# Patient Record
Sex: FEMALE | Race: WHITE | NOT HISPANIC OR LATINO | Employment: OTHER | URBAN - METROPOLITAN AREA
[De-identification: names, ages, dates, MRNs, and addresses within clinical notes are randomized per-mention and may not be internally consistent; named-entity substitution may affect disease eponyms.]

---

## 2017-02-15 ENCOUNTER — TELEPHONE (OUTPATIENT)
Dept: ENDOCRINOLOGY | Facility: CLINIC | Age: 52
End: 2017-02-15

## 2017-02-15 LAB
25(OH)D3 SERPL-MCNC: 80 NG/ML (ref 30–100)
TSH SERPL-ACNC: 1.13 MIU/L

## 2017-10-03 ENCOUNTER — TELEPHONE (OUTPATIENT)
Dept: ENDOCRINOLOGY | Facility: CLINIC | Age: 52
End: 2017-10-03

## 2017-10-03 DIAGNOSIS — E03.9 HYPOTHYROIDISM, UNSPECIFIED TYPE: Primary | ICD-10-CM

## 2017-10-03 DIAGNOSIS — E89.0 POSTABLATIVE HYPOTHYROIDISM: ICD-10-CM

## 2017-10-03 DIAGNOSIS — E55.9 VITAMIN D DEFICIENCY: ICD-10-CM

## 2017-10-03 RX ORDER — LEVOTHYROXINE SODIUM 100 UG/1
100 TABLET ORAL DAILY
Qty: 90 TABLET | Refills: 1 | Status: SHIPPED | OUTPATIENT
Start: 2017-10-03 | End: 2018-02-09 | Stop reason: SDUPTHER

## 2017-10-04 ENCOUNTER — TELEPHONE (OUTPATIENT)
Dept: ENDOCRINOLOGY | Facility: CLINIC | Age: 52
End: 2017-10-04

## 2017-10-04 DIAGNOSIS — E89.0 POSTABLATIVE HYPOTHYROIDISM: ICD-10-CM

## 2017-10-04 DIAGNOSIS — E55.9 VITAMIN D DEFICIENCY DISEASE: Primary | ICD-10-CM

## 2017-10-04 NOTE — TELEPHONE ENCOUNTER
Patient notified lab orders are placed for [facility] and to have drawn a week or two before her follow up visit

## 2017-10-04 NOTE — TELEPHONE ENCOUNTER
Patient has a follow up visit with you in February.  Needs tsh and vitamin d orders placed to be done at [facility].

## 2017-10-04 NOTE — TELEPHONE ENCOUNTER
----- Message from Carla Mcdonough sent at 10/4/2017 10:33 AM CDT -----  Contact: Self    [phone]  Shahrzad   -  Patient  Is returning the Dr phone call , please call the pt back. No addidional information was provided  thanks

## 2018-01-31 LAB
ALBUMIN SERPL-MCNC: 4.1 G/DL (ref 3.6–5.1)
ALBUMIN/GLOB SERPL: 1 (CALC) (ref 1–2.5)
ALP SERPL-CCNC: 50 U/L (ref 33–130)
ALT SERPL-CCNC: 12 U/L (ref 6–29)
AST SERPL-CCNC: 22 U/L (ref 10–35)
BILIRUB SERPL-MCNC: 0.4 MG/DL (ref 0.2–1.2)
BUN SERPL-MCNC: 13 MG/DL (ref 7–25)
BUN/CREAT SERPL: ABNORMAL (CALC) (ref 6–22)
CALCIUM SERPL-MCNC: 9.2 MG/DL (ref 8.6–10.4)
CHLORIDE SERPL-SCNC: 103 MMOL/L (ref 98–110)
CO2 SERPL-SCNC: 30 MMOL/L (ref 20–31)
CREAT SERPL-MCNC: 0.71 MG/DL (ref 0.5–1.05)
GFR SERPL CREATININE-BSD FRML MDRD: 97 ML/MIN/1.73M2
GLOBULIN SER CALC-MCNC: 4 G/DL (CALC) (ref 1.9–3.7)
GLUCOSE SERPL-MCNC: 92 MG/DL (ref 65–99)
POTASSIUM SERPL-SCNC: 3.7 MMOL/L (ref 3.5–5.3)
PROT SERPL-MCNC: 8.1 G/DL (ref 6.1–8.1)
SODIUM SERPL-SCNC: 137 MMOL/L (ref 135–146)
TSH SERPL-ACNC: 0.86 MIU/L

## 2018-02-09 ENCOUNTER — OFFICE VISIT (OUTPATIENT)
Dept: ENDOCRINOLOGY | Facility: CLINIC | Age: 53
End: 2018-02-09
Payer: MEDICARE

## 2018-02-09 VITALS
DIASTOLIC BLOOD PRESSURE: 74 MMHG | BODY MASS INDEX: 22.57 KG/M2 | HEIGHT: 66 IN | WEIGHT: 140.44 LBS | SYSTOLIC BLOOD PRESSURE: 116 MMHG

## 2018-02-09 DIAGNOSIS — E05.00 GRAVES' ORBITOPATHY: ICD-10-CM

## 2018-02-09 DIAGNOSIS — E89.0 POSTABLATIVE HYPOTHYROIDISM: Primary | ICD-10-CM

## 2018-02-09 DIAGNOSIS — Z86.39 HX OF GRAVES' DISEASE: ICD-10-CM

## 2018-02-09 DIAGNOSIS — E55.9 VITAMIN D DEFICIENCY DISEASE: ICD-10-CM

## 2018-02-09 PROCEDURE — 99213 OFFICE O/P EST LOW 20 MIN: CPT | Mod: PBBFAC | Performed by: INTERNAL MEDICINE

## 2018-02-09 PROCEDURE — 99213 OFFICE O/P EST LOW 20 MIN: CPT | Mod: S$GLB,,, | Performed by: INTERNAL MEDICINE

## 2018-02-09 PROCEDURE — 99999 PR PBB SHADOW E&M-EST. PATIENT-LVL III: CPT | Mod: PBBFAC,,, | Performed by: INTERNAL MEDICINE

## 2018-02-09 RX ORDER — LEVOTHYROXINE SODIUM 100 UG/1
100 TABLET ORAL DAILY
Qty: 30 TABLET | Refills: 11 | Status: SHIPPED | OUTPATIENT
Start: 2018-02-09 | End: 2018-03-26

## 2018-02-09 RX ORDER — DULOXETIN HYDROCHLORIDE 20 MG/1
20 CAPSULE, DELAYED RELEASE ORAL 2 TIMES DAILY
COMMUNITY
End: 2024-02-15

## 2018-02-09 NOTE — PROGRESS NOTES
Subjective:      Patient ID: Alina Rendon is a 52 y.o. female.    Chief Complaint:  Postablative hypothyroidism      History of Present Illness  Ms. TAMMY Rendon presents for follow up of hypothyroidism. Last seen 10/2016.    With hypothyroidism s/p I-131 tx on 1999 for Graves' disease and is taking Synthroid 93 mcg daily and she does this by taking 100 mcg daily except for Sunday that she takes 1/2 tab.  Takes thyroid hormone on empty stomach and separate from other meds. No missed doses.     Has had more fatigue recently. No hot flashes. Has cold intolerance.      Has Graves' orbitopathy but denies blurry vision, double vision, eye pain, or worsening proptosis.     10 lb wt gain since last visit. No diarrhea or constipation.     No excess hair loss. No dry skin.      For vitamin D deficiency she is taking vitamin D 1000 daily. No hx of fractures.    Has CIDP/ autoimmune neuropathy that is being treated with IV Ig infusion every month and other meds.       Has neuropathy and Cymbalta recently added.      Review of Systems   Constitutional: Negative for chills and fever.   Gastrointestinal: Negative for nausea.   No CP  No SOB    Objective:   Physical Exam   Nursing note and vitals reviewed.    Lab Review:   Results for ALINA WHITFIELD (MRN [id number]) as of 2/9/2018 08:08   Ref. Range 1/17/2013 10:45 2/14/2017 12:33 1/30/2018 13:15   TSH Latest Units: mIU/L 1.454 1.13 0.86   Free T4 Latest Ref Range: 0.71 - 1.51 ng/dl 1.27         Assessment:     1. Postablative hypothyroidism    2. Hx of Graves' disease    3. Graves' orbitopathy    4. Vitamin D deficiency disease      Plan:     --Patient with history of Graves disease now s/p ROJAS ablation with hypothyroidism  --Clinically and biochemically euthyroid at this time  --Will continue current dose of Synthroid 100mcg M-Sat with 50 mcg on Sun (refill sent to pharmacy  --Will repeat TSH in 6 months  --Has Graves orbitopathy that is not clinically active at this  time  --Continue Vit D 1000 units daily     RTC in 1 year, TSH in 6 months     Marty Markham M.D. Staff Endocrinology

## 2018-03-26 DIAGNOSIS — E89.0 POSTABLATIVE HYPOTHYROIDISM: ICD-10-CM

## 2018-03-26 RX ORDER — LEVOTHYROXINE SODIUM 100 UG/1
100 TABLET ORAL DAILY
Qty: 90 TABLET | Refills: 1 | Status: SHIPPED | OUTPATIENT
Start: 2018-03-26 | End: 2018-10-12 | Stop reason: SDUPTHER

## 2018-05-05 ENCOUNTER — OFFICE VISIT (OUTPATIENT)
Dept: URGENT CARE | Facility: CLINIC | Age: 53
End: 2018-05-05
Payer: COMMERCIAL

## 2018-05-05 VITALS
HEIGHT: 66 IN | WEIGHT: 143 LBS | BODY MASS INDEX: 22.98 KG/M2 | HEART RATE: 91 BPM | RESPIRATION RATE: 12 BRPM | DIASTOLIC BLOOD PRESSURE: 64 MMHG | SYSTOLIC BLOOD PRESSURE: 112 MMHG | OXYGEN SATURATION: 98 %

## 2018-05-05 DIAGNOSIS — K29.00 ACUTE GASTRITIS WITHOUT HEMORRHAGE, UNSPECIFIED GASTRITIS TYPE: Primary | ICD-10-CM

## 2018-05-05 DIAGNOSIS — R10.9 ABDOMINAL PAIN, UNSPECIFIED ABDOMINAL LOCATION: ICD-10-CM

## 2018-05-05 PROCEDURE — 99203 OFFICE O/P NEW LOW 30 MIN: CPT | Mod: S$GLB,,, | Performed by: FAMILY MEDICINE

## 2018-05-05 RX ORDER — POTASSIUM CHLORIDE 20 MEQ/1
TABLET, EXTENDED RELEASE ORAL
COMMUNITY
Start: 2018-01-29 | End: 2018-05-05

## 2018-05-05 RX ORDER — SIMETHICONE 125 MG
125 TABLET,CHEWABLE ORAL EVERY 6 HOURS PRN
Qty: 20 TABLET | Refills: 0 | Status: SHIPPED | OUTPATIENT
Start: 2018-05-05 | End: 2018-05-15

## 2018-05-05 RX ORDER — ONDANSETRON 4 MG/1
4 TABLET, ORALLY DISINTEGRATING ORAL EVERY 8 HOURS PRN
Qty: 9 TABLET | Refills: 0 | Status: SHIPPED | OUTPATIENT
Start: 2018-05-05 | End: 2018-05-08

## 2018-05-05 RX ORDER — DICYCLOMINE HYDROCHLORIDE 20 MG/1
20 TABLET ORAL
Qty: 20 TABLET | Refills: 0 | Status: SHIPPED | OUTPATIENT
Start: 2018-05-05 | End: 2018-05-10

## 2018-05-05 NOTE — PROGRESS NOTES
"Subjective:       Patient ID: Alina Rendon is a 52 y.o. female.    Vitals:  height is 5' 6" (1.676 m) and weight is 64.9 kg (143 lb). Her blood pressure is 112/64 and her pulse is 91. Her respiration is 12 and oxygen saturation is 98%.     Chief Complaint: Abdominal Pain    Abdominal Pain   This is a new problem. Episode onset: 1 1/2 week. The onset quality is sudden. The problem occurs intermittently. The problem has been gradually worsening. The pain is located in the generalized abdominal region. The pain is at a severity of 10/10. The pain is severe. The abdominal pain does not radiate. Associated symptoms include constipation, diarrhea and frequency. Pertinent negatives include no anorexia, arthralgias, belching, dysuria, fever, flatus, headaches, hematochezia, hematuria, melena, myalgias, nausea, vomiting or weight loss. Nothing aggravates the pain. The pain is relieved by nothing. She has tried nothing for the symptoms.     Review of Systems   Constitution: Negative for chills, fever and weight loss.   Cardiovascular: Negative for chest pain.   Respiratory: Negative for shortness of breath.    Musculoskeletal: Negative for arthralgias, back pain and myalgias.   Gastrointestinal: Positive for abdominal pain, constipation and diarrhea. Negative for anorexia, flatus, hematochezia, melena, nausea and vomiting.   Genitourinary: Positive for frequency. Negative for dysuria and hematuria.   Neurological: Negative for headaches.   All other systems reviewed and are negative.      Objective:      Physical Exam   Constitutional: She is oriented to person, place, and time. She appears well-developed and well-nourished.   HENT:   Head: Normocephalic and atraumatic.   Right Ear: External ear normal.   Left Ear: External ear normal.   Nose: Nose normal.   Mouth/Throat: Mucous membranes are normal.   Eyes: Conjunctivae and lids are normal.   Neck: Trachea normal and full passive range of motion without pain. Neck " supple.   Cardiovascular: Normal rate, regular rhythm and normal heart sounds.    Pulmonary/Chest: Effort normal and breath sounds normal. No respiratory distress.   Abdominal: Soft. Normal appearance. She exhibits no distension, no abdominal bruit, no pulsatile midline mass and no mass. Bowel sounds are increased. There is no hepatosplenomegaly. There is generalized tenderness. There is no rigidity, no rebound, no guarding, no CVA tenderness, no tenderness at McBurney's point and negative Hansen's sign.   Neurological: She is alert and oriented to person, place, and time. She has normal strength.   Skin: Skin is warm, dry and intact. She is not diaphoretic. No pallor.   Psychiatric: She has a normal mood and affect. Her speech is normal and behavior is normal. Judgment and thought content normal. Cognition and memory are normal.   Nursing note and vitals reviewed.      Assessment:       1. Acute gastritis without hemorrhage, unspecified gastritis type    2. Abdominal pain, unspecified abdominal location        Plan:         Acute gastritis without hemorrhage, unspecified gastritis type  -     dicyclomine (BENTYL) 20 mg tablet; Take 1 tablet (20 mg total) by mouth 4 (four) times daily before meals and nightly.  Dispense: 20 tablet; Refill: 0  -     ondansetron (ZOFRAN-ODT) 4 MG TbDL; Take 1 tablet (4 mg total) by mouth every 8 (eight) hours as needed.  Dispense: 9 tablet; Refill: 0  -     ranitidine (ZANTAC) 150 MG tablet; Take 1 tablet (150 mg total) by mouth nightly.  Dispense: 30 tablet; Refill: 0    Abdominal pain, unspecified abdominal location  -     X-Ray Abdomen AP 1 View; Future; Expected date: 05/05/2018    Other orders  -     simethicone (MYLICON) 125 MG chewable tablet; Take 1 tablet (125 mg total) by mouth every 6 (six) hours as needed for Flatulence.  Dispense: 20 tablet; Refill: 0      Patient Instructions     Gastritis (Adult)    Gastritis is inflammation and irritation of the stomach lining. It can  be present for a short time (acute) or be long lasting (chronic). Gastritis is often caused by infection with bacteria called H pylori. More than a third of people in the [place] have this bacteria in their bodies. In many cases, H pylori causes no problems or symptoms. In some people, though, the infection irritates the stomach lining and causes gastritis. Other causes of stomach irritation include drinking alcohol or taking pain-relieving medicines called NSAIDs (such as aspirin or ibuprofen).   Symptoms of gastritis can include:  · Abdominal pain or bloating  · Loss of appetite  · Nausea or vomiting  · Vomiting blood or having black stools  · Feeling more tired than usual  An inflamed and irritated stomach lining is more likely to develop a sore called an ulcer. To help prevent this, gastritis should be treated.  Home care  If needed, medicines may be prescribed. If you have H pylori infection, treating it will likely relieve your symptoms. Other changes can help reduce stomach irritation and help it heal.  · If you have been prescribed medicines for H pylori infection, take them as directed. Take all of the medicine until it is finished or your healthcare provider tells you to stop, even if you feel better.  · Your healthcare provider may recommend avoiding NSAIDs. If you take daily aspirin for your heart or other medical reasons, do not stop without talking to your healthcare provider first.  · Avoid drinking alcohol.  · Stop smoking. Smoking can irritate the stomach and delay healing. As much as possible, stay away from second hand smoke.  Follow-up care  Follow up with your healthcare provider, or as advised by our staff. Testing may be needed to check for inflammation or an ulcer.  When to seek medical advice  Call your healthcare provider for any of the following:  · Stomach pain that gets worse or moves to the lower right abdomen (appendix area)  · Chest pain that appears or gets worse, or spreads to the back,  neck, shoulder, or arm  · Frequent vomiting (cant keep down liquids)  · Blood in the stool or vomit (red or black in color)  · Feeling weak or dizzy  · Fever of 100.4ºF (38ºC) or higher, or as directed by your healthcare provider  Date Last Reviewed: 6/22/2015  © 1397-2299 [employer]. [address] [place] [address]. All rights reserved. This information is not intended as a substitute for professional medical care. Always follow your healthcare professional's instructions.

## 2018-05-05 NOTE — PATIENT INSTRUCTIONS
Gastritis (Adult)    Gastritis is inflammation and irritation of the stomach lining. It can be present for a short time (acute) or be long lasting (chronic). Gastritis is often caused by infection with bacteria called H pylori. More than a third of people in the [place] have this bacteria in their bodies. In many cases, H pylori causes no problems or symptoms. In some people, though, the infection irritates the stomach lining and causes gastritis. Other causes of stomach irritation include drinking alcohol or taking pain-relieving medicines called NSAIDs (such as aspirin or ibuprofen).   Symptoms of gastritis can include:  · Abdominal pain or bloating  · Loss of appetite  · Nausea or vomiting  · Vomiting blood or having black stools  · Feeling more tired than usual  An inflamed and irritated stomach lining is more likely to develop a sore called an ulcer. To help prevent this, gastritis should be treated.  Home care  If needed, medicines may be prescribed. If you have H pylori infection, treating it will likely relieve your symptoms. Other changes can help reduce stomach irritation and help it heal.  · If you have been prescribed medicines for H pylori infection, take them as directed. Take all of the medicine until it is finished or your healthcare provider tells you to stop, even if you feel better.  · Your healthcare provider may recommend avoiding NSAIDs. If you take daily aspirin for your heart or other medical reasons, do not stop without talking to your healthcare provider first.  · Avoid drinking alcohol.  · Stop smoking. Smoking can irritate the stomach and delay healing. As much as possible, stay away from second hand smoke.  Follow-up care  Follow up with your healthcare provider, or as advised by our staff. Testing may be needed to check for inflammation or an ulcer.  When to seek medical advice  Call your healthcare provider for any of the following:  · Stomach pain that gets worse or moves to the lower  right abdomen (appendix area)  · Chest pain that appears or gets worse, or spreads to the back, neck, shoulder, or arm  · Frequent vomiting (cant keep down liquids)  · Blood in the stool or vomit (red or black in color)  · Feeling weak or dizzy  · Fever of 100.4ºF (38ºC) or higher, or as directed by your healthcare provider  Date Last Reviewed: 6/22/2015  © 3416-7183 [employer]. [address] [place] [address]. All rights reserved. This information is not intended as a substitute for professional medical care. Always follow your healthcare professional's instructions.

## 2018-10-12 DIAGNOSIS — E89.0 POSTABLATIVE HYPOTHYROIDISM: ICD-10-CM

## 2018-10-12 RX ORDER — LEVOTHYROXINE SODIUM 100 UG/1
100 TABLET ORAL DAILY
Qty: 90 TABLET | Refills: 1 | Status: SHIPPED | OUTPATIENT
Start: 2018-10-12 | End: 2019-01-21 | Stop reason: SDUPTHER

## 2019-01-03 ENCOUNTER — OFFICE VISIT (OUTPATIENT)
Dept: URGENT CARE | Facility: CLINIC | Age: 54
End: 2019-01-03
Payer: COMMERCIAL

## 2019-01-03 VITALS
OXYGEN SATURATION: 97 % | HEART RATE: 88 BPM | RESPIRATION RATE: 20 BRPM | WEIGHT: 143 LBS | BODY MASS INDEX: 22.98 KG/M2 | SYSTOLIC BLOOD PRESSURE: 103 MMHG | HEIGHT: 66 IN | TEMPERATURE: 98 F | DIASTOLIC BLOOD PRESSURE: 61 MMHG

## 2019-01-03 DIAGNOSIS — R30.0 DYSURIA: ICD-10-CM

## 2019-01-03 DIAGNOSIS — R31.9 URINARY TRACT INFECTION WITH HEMATURIA, SITE UNSPECIFIED: Primary | ICD-10-CM

## 2019-01-03 DIAGNOSIS — N39.0 URINARY TRACT INFECTION WITH HEMATURIA, SITE UNSPECIFIED: Primary | ICD-10-CM

## 2019-01-03 LAB
BILIRUB UR QL STRIP: NEGATIVE
GLUCOSE UR QL STRIP: NEGATIVE
KETONES UR QL STRIP: NEGATIVE
LEUKOCYTE ESTERASE UR QL STRIP: POSITIVE
PH, POC UA: 5.5 (ref 5–8)
POC BLOOD, URINE: POSITIVE
POC NITRATES, URINE: NEGATIVE
PROT UR QL STRIP: NEGATIVE
SP GR UR STRIP: 1.01 (ref 1–1.03)
UROBILINOGEN UR STRIP-ACNC: NORMAL (ref 0.1–1.1)

## 2019-01-03 PROCEDURE — 81003 URINALYSIS AUTO W/O SCOPE: CPT | Mod: QW,S$GLB,, | Performed by: NURSE PRACTITIONER

## 2019-01-03 PROCEDURE — 99214 OFFICE O/P EST MOD 30 MIN: CPT | Mod: 25,S$GLB,, | Performed by: NURSE PRACTITIONER

## 2019-01-03 PROCEDURE — 99214 PR OFFICE/OUTPT VISIT, EST, LEVL IV, 30-39 MIN: ICD-10-PCS | Mod: 25,S$GLB,, | Performed by: NURSE PRACTITIONER

## 2019-01-03 PROCEDURE — 81003 POCT URINALYSIS, DIPSTICK, AUTOMATED, W/O SCOPE: ICD-10-PCS | Mod: QW,S$GLB,, | Performed by: NURSE PRACTITIONER

## 2019-01-03 RX ORDER — PHENAZOPYRIDINE HYDROCHLORIDE 200 MG/1
200 TABLET, FILM COATED ORAL 3 TIMES DAILY PRN
Qty: 20 TABLET | Refills: 0 | Status: SHIPPED | OUTPATIENT
Start: 2019-01-03 | End: 2019-07-10 | Stop reason: CLARIF

## 2019-01-03 RX ORDER — NITROFURANTOIN 25; 75 MG/1; MG/1
100 CAPSULE ORAL 2 TIMES DAILY
Qty: 14 CAPSULE | Refills: 0 | Status: SHIPPED | OUTPATIENT
Start: 2019-01-03 | End: 2019-01-10

## 2019-01-03 NOTE — PATIENT INSTRUCTIONS

## 2019-01-03 NOTE — PROGRESS NOTES
"Subjective:       Patient ID: Alina Rendon is a 53 y.o. female.    Vitals:  height is 5' 6" (1.676 m) and weight is 64.9 kg (143 lb). Her temperature is 98 °F (36.7 °C). Her blood pressure is 103/61 and her pulse is 88. Her respiration is 20 and oxygen saturation is 97%.     Chief Complaint: Urinary Tract Infection    Pt has a sensation to urinate but doesn't emptie as much. She also has some burning.      Urinary Tract Infection    This is a new problem. The current episode started yesterday. The problem occurs every urination. The problem has been unchanged. The quality of the pain is described as burning. The pain is mild. Pertinent negatives include no chills, frequency, hematuria, nausea, urgency, vomiting or rash. She has tried nothing for the symptoms.       Constitution: Negative for chills and fever.   Neck: Negative for painful lymph nodes.   Gastrointestinal: Negative for abdominal pain, nausea and vomiting.   Genitourinary: Positive for dysuria. Negative for frequency, urgency, urine decreased, hematuria, history of kidney stones, painful menstruation, irregular menstruation, missed menses, heavy menstrual bleeding, ovarian cysts, genital trauma, vaginal pain, vaginal discharge, vaginal bleeding, vaginal odor, painful intercourse, genital sore, painful ejaculation and pelvic pain.   Musculoskeletal: Negative for back pain.   Skin: Negative for rash and lesion.   Hematologic/Lymphatic: Negative for swollen lymph nodes.       Objective:      Physical Exam   Constitutional: She is oriented to person, place, and time. She appears well-developed and well-nourished.   HENT:   Head: Normocephalic and atraumatic.   Nose: Nose normal. No nasal deformity. No epistaxis.   Mouth/Throat: Mucous membranes are normal.   Eyes: Conjunctivae and lids are normal.   Neck: Trachea normal, normal range of motion and phonation normal. Neck supple.   Cardiovascular: Normal rate, regular rhythm, normal heart sounds and " "normal pulses.   Pulmonary/Chest: Effort normal and breath sounds normal.   Abdominal: Soft. Normal appearance and bowel sounds are normal. She exhibits no distension and no mass. There is no tenderness. There is no CVA tenderness.   Genitourinary: No vaginal discharge found.   Neurological: She is alert and oriented to person, place, and time.   Skin: Skin is warm, dry and intact.   Psychiatric: She has a normal mood and affect. Her speech is normal and behavior is normal. Cognition and memory are normal.   Nursing note and vitals reviewed.      Results for orders placed or performed in visit on 01/03/19   POCT Urinalysis, Dipstick, Automated, W/O Scope   Result Value Ref Range    POC Blood, Urine Positive (A) Negative    POC Bilirubin, Urine Negative Negative    POC Urobilinogen, Urine normal 0.1 - 1.1    POC Ketones, Urine Negative Negative    POC Protein, Urine Negative Negative    POC Nitrates, Urine Negative Negative    POC Glucose, Urine Negative Negative    pH, UA 5.5 5 - 8    POC Specific Gravity, Urine 1.015 1.003 - 1.029    POC Leukocytes, Urine Positive (A) Negative       Assessment:       1. Urinary tract infection with hematuria, site unspecified    2. Dysuria        Plan:       Patient Instructions     Bladder Infection, Female (Adult)    Urine is normally doesn't have any bacteria in it. But bacteria can get into the urinary tract from the skin around the rectum. Or they can travel in the blood from elsewhere in the body. Once they are in your urinary tract, they can cause infection in the urethra (urethritis), the bladder (cystitis), or the kidneys (pyelonephritis).  The most common place for an infection is in the bladder. This is called a bladder infection. This is one of the most common infections in women. Most bladder infections are easily treated. They are not serious unless the infection spreads to the kidney.  The phrases "bladder infection," "UTI," and "cystitis" are often used to describe " the same thing. But they are not always the same. Cystitis is an inflammation of the bladder. The most common cause of cystitis is an infection.  Symptoms  The infection causes inflammation in the urethra and bladder. This causes many of the symptoms. The most common symptoms of a bladder infection are:  · Pain or burning when urinating  · Having to urinate more often than usual  · Urgent need to urinate  · Only a small amount of urine comes out  · Blood in urine  · Abdominal discomfort. This is usually in the lower abdomen above the pubic bone.  · Cloudy urine  · Strong- or bad-smelling urine  · Unable to urinate (urinary retention)  · Unable to hold urine in (urinary incontinence)  · Fever  · Loss of appetite  · Confusion (in older adults)  Causes  Bladder infections are not contagious. You can't get one from someone else, from a toilet seat, or from sharing a bath.  The most common cause of bladder infections is bacteria from the bowels. The bacteria get onto the skin around the opening of the urethra. From there, they can get into the urine and travel up to the bladder, causing inflammation and infection. This usually happens because of:  · Wiping improperly after urinating. Always wipe from front to back.  · Bowel incontinence  · Pregnancy  · Procedures such as having a catheter inserted  · Older age  · Not emptying your bladder. This can allow bacteria a chance to grow in your urine.  · Dehydration  · Constipation  · Sex  · Use of a diaphragm for birth control   Treatment  Bladder infections are diagnosed by a urine test. They are treated with antibiotics and usually clear up quickly without complications. Treatment helps prevent a more serious kidney infection.  Medicines  Medicines can help in the treatment of a bladder infection:  · Take antibiotics until they are used up, even if you feel better. It is important to finish them to make sure the infection has cleared.  · You can use acetaminophen or  ibuprofen for pain, fever, or discomfort, unless another medicine was prescribed. If you have chronic liver or kidney disease, talk with your healthcare provider before using these medicines. Also talk with your provider if you've ever had a stomach ulcer or gastrointestinal bleeding, or are taking blood-thinner medicines.  · If you are given phenazopydridine to reduce burning with urination, it will cause your urine to become a bright orange color. This can stain clothing.  Care and prevention  These self-care steps can help prevent future infections:  · Drink plenty of fluids to prevent dehydration and flush out your bladder. Do this unless you must restrict fluids for other health reasons, or your doctor told you not to.  · Proper cleaning after going to the bathroom is important. Wipe from front to back after using the toilet to prevent the spread of bacteria.  · Urinate more often. Don't try to hold urine in for a long time.  · Wear loose-fitting clothes and cotton underwear. Avoid tight-fitting pants.  · Improve your diet and prevent constipation. Eat more fresh fruit and vegetables, and fiber, and less junk and fatty foods.  · Avoid sex until your symptoms are gone.  · Avoid caffeine, alcohol, and spicy foods. These can irritate your bladder.  · Urinate right after intercourse to flush out your bladder.  · If you use birth control pills and have frequent bladder infections, discuss it with your doctor.  Follow-up care  Call your healthcare provider if all symptoms are not gone after 3 days of treatment. This is especially important if you have repeat infections.  If a culture was done, you will be told if your treatment needs to be changed. If directed, you can call to find out the results.  If X-rays were done, you will be told if the results will affect your treatment.  Call 911  Call 911 if any of the following occur:  · Trouble breathing  · Hard to wake up or confusion  · Fainting or loss of  consciousness  · Rapid heart rate  When to seek medical advice  Call your healthcare provider right away if any of these occur:  · Fever of 100.4ºF (38.0ºC) or higher, or as directed by your healthcare provider  · Symptoms are not better by the third day of treatment  · Back or belly (abdominal) pain that gets worse  · Repeated vomiting, or unable to keep medicine down  · Weakness or dizziness  · Vaginal discharge  · Pain, redness, or swelling in the outer vaginal area (labia)  Date Last Reviewed: 10/1/2016  © 0348-4304 [employer]. [address] [place] [address]. All rights reserved. This information is not intended as a substitute for professional medical care. Always follow your healthcare professional's instructions.              Urinary tract infection with hematuria, site unspecified    Dysuria  -     POCT Urinalysis, Dipstick, Automated, W/O Scope    Other orders  -     nitrofurantoin, macrocrystal-monohydrate, (MACROBID) 100 MG capsule; Take 1 capsule (100 mg total) by mouth 2 (two) times daily. for 7 days  Dispense: 14 capsule; Refill: 0  -     phenazopyridine (PYRIDIUM) 200 MG tablet; Take 1 tablet (200 mg total) by mouth 3 (three) times daily as needed for Pain.  Dispense: 20 tablet; Refill: 0

## 2019-01-15 ENCOUNTER — TELEPHONE (OUTPATIENT)
Dept: ENDOCRINOLOGY | Facility: CLINIC | Age: 54
End: 2019-01-15

## 2019-01-15 NOTE — TELEPHONE ENCOUNTER
----- Message from Merle Jaimes sent at 1/15/2019  2:01 PM CST -----  Contact: Self [phone]  PT is requesting a call at [phone]. No other information provided.

## 2019-01-15 NOTE — TELEPHONE ENCOUNTER
Did a PA over the phone to [facility] and they should send in something with in 72 hours.

## 2019-01-15 NOTE — TELEPHONE ENCOUNTER
Patient said they her insurance company will no longer cover her Synthroid and she will no longer go on generic. She now is under [facility]. I told her I would give them a call now an start a PA for Brand name only.

## 2019-01-21 DIAGNOSIS — E89.0 POSTABLATIVE HYPOTHYROIDISM: ICD-10-CM

## 2019-01-21 RX ORDER — LEVOTHYROXINE SODIUM 100 UG/1
100 TABLET ORAL DAILY
Qty: 90 TABLET | Refills: 1 | Status: SHIPPED | OUTPATIENT
Start: 2019-01-21 | End: 2019-05-17 | Stop reason: SDUPTHER

## 2019-01-21 NOTE — TELEPHONE ENCOUNTER
----- Message from Rosa Maria Ruiz sent at 1/21/2019  1:22 PM CST -----  Contact: Alina    [phone]   Needs Advice    Reason for call:   Pt.says she wants to speak to  You about the Synthroid and how it needs to be sent to the Pharmacy, since it was recently denied.         Communication Preference:  Phone     Additional Information:  Pls call to discuss this problem, as per pt.

## 2019-01-22 ENCOUNTER — TELEPHONE (OUTPATIENT)
Dept: ENDOCRINOLOGY | Facility: CLINIC | Age: 54
End: 2019-01-22

## 2019-01-22 NOTE — TELEPHONE ENCOUNTER
Returned pt's call.   After speaking with pt and pharmacy, figured out that pt needs a PA for her Synthroid because she can not take the generic brand.

## 2019-01-22 NOTE — TELEPHONE ENCOUNTER
----- Message from Radha Hall sent at 1/22/2019 12:34 PM CST -----  Contact: Pt   Pt is requesting a call back in regards to changing her  SYNTHROID 100 mcg tablet to the generic brand.       Pt can be contacted at [phone]

## 2019-01-24 ENCOUNTER — TELEPHONE (OUTPATIENT)
Dept: ENDOCRINOLOGY | Facility: CLINIC | Age: 54
End: 2019-01-24

## 2019-01-24 NOTE — TELEPHONE ENCOUNTER
Received approval back for Synthroid for patient.     PA Case ID: [id number]   Approval date: 01/24/2019-01/24/2020     Patient notified.

## 2019-01-24 NOTE — TELEPHONE ENCOUNTER
Submitted PA Request for Synthroid (name brand only) to patient's insurance via CoverMyMeds.     Key: [id number]

## 2019-05-17 DIAGNOSIS — E89.0 POSTABLATIVE HYPOTHYROIDISM: ICD-10-CM

## 2019-05-17 RX ORDER — LEVOTHYROXINE SODIUM 100 UG/1
100 TABLET ORAL DAILY
Qty: 90 TABLET | Refills: 1 | Status: SHIPPED | OUTPATIENT
Start: 2019-05-17 | End: 2020-01-21

## 2019-05-17 NOTE — TELEPHONE ENCOUNTER
----- Message from Ayleen Hall sent at 5/17/2019  1:42 PM CDT -----  Contact: self  Pt needs a refill on her medication.  She uses [facility].    Pt can be reached at [phone]    eÃ‡ift    Pt would also like to schedule an appt.

## 2019-07-10 ENCOUNTER — OFFICE VISIT (OUTPATIENT)
Dept: URGENT CARE | Facility: CLINIC | Age: 54
End: 2019-07-10
Payer: COMMERCIAL

## 2019-07-10 VITALS
BODY MASS INDEX: 23.08 KG/M2 | DIASTOLIC BLOOD PRESSURE: 69 MMHG | TEMPERATURE: 98 F | SYSTOLIC BLOOD PRESSURE: 119 MMHG | WEIGHT: 143 LBS | HEART RATE: 77 BPM | OXYGEN SATURATION: 98 %

## 2019-07-10 DIAGNOSIS — N30.01 ACUTE CYSTITIS WITH HEMATURIA: Primary | ICD-10-CM

## 2019-07-10 LAB
BILIRUB UR QL STRIP: NEGATIVE
GLUCOSE UR QL STRIP: NEGATIVE
KETONES UR QL STRIP: NEGATIVE
LEUKOCYTE ESTERASE UR QL STRIP: POSITIVE
PH, POC UA: 6 (ref 5–8)
POC BLOOD, URINE: POSITIVE
POC NITRATES, URINE: NEGATIVE
PROT UR QL STRIP: POSITIVE
SP GR UR STRIP: 1.01 (ref 1–1.03)
UROBILINOGEN UR STRIP-ACNC: ABNORMAL (ref 0.1–1.1)

## 2019-07-10 PROCEDURE — 99214 PR OFFICE/OUTPT VISIT, EST, LEVL IV, 30-39 MIN: ICD-10-PCS | Mod: S$GLB,,, | Performed by: PHYSICIAN ASSISTANT

## 2019-07-10 PROCEDURE — 81003 POCT URINALYSIS, DIPSTICK, AUTOMATED, W/O SCOPE: ICD-10-PCS | Mod: QW,S$GLB,, | Performed by: PHYSICIAN ASSISTANT

## 2019-07-10 PROCEDURE — 99214 OFFICE O/P EST MOD 30 MIN: CPT | Mod: S$GLB,,, | Performed by: PHYSICIAN ASSISTANT

## 2019-07-10 PROCEDURE — 81003 URINALYSIS AUTO W/O SCOPE: CPT | Mod: QW,S$GLB,, | Performed by: PHYSICIAN ASSISTANT

## 2019-07-10 RX ORDER — NITROFURANTOIN 25; 75 MG/1; MG/1
100 CAPSULE ORAL 2 TIMES DAILY
Qty: 10 CAPSULE | Refills: 0 | Status: SHIPPED | OUTPATIENT
Start: 2019-07-10 | End: 2019-07-15

## 2019-07-10 RX ORDER — PHENAZOPYRIDINE HYDROCHLORIDE 200 MG/1
200 TABLET, FILM COATED ORAL 2 TIMES DAILY PRN
Qty: 6 TABLET | Refills: 0 | Status: SHIPPED | OUTPATIENT
Start: 2019-07-10 | End: 2019-07-13

## 2019-07-10 NOTE — PATIENT INSTRUCTIONS
- Rest.    - Drink plenty of fluids.    - Tylenol or Ibuprofen as directed as needed for fever/pain. Avoid tylenol if you have a history of liver disease. Do not take ibuprofen if you have a history of GI bleeding, kidney disease, or if you take blood thinners.     - I prescribed phenazopyridine for symptom relief of burning with urination.  If this medication is not covered by insurance, You can take over the counter AZO as directed for discomfort with urination.       - You have been given an antibiotic to treat your condition today.    - Please complete the antibiotic as directed on the bottle.   - If you are female and on oral birth control pills, use additional methods to prevent pregnancy while on antibiotics and for one cycle after.   - you can take over-the-counter probiotics during and after antibiotic use to help preserve gut jose alejandro and reduce gastrointestinal symptoms    - Follow up with your PCP or specialty clinic as directed in the next 3-4 days if not improved or as needed.  You can call [phone] to schedule an appointment with the appropriate provider.    - Go to the ER or seek medical attention immediately if you develop new or worsening symptoms.    - You must understand that you have received an Urgent Care treatment only and that you may be released before all of your medical problems are known or treated.   - You, the patient, will arrange for follow up care as instructed.   - If your condition worsens or fails to improve we recommend that you receive another evaluation at the ER immediately or contact your PCP to discuss your concerns or return here.         Urinary Tract Infections in Women    Urinary tract infections (UTIs) are most often caused by bacteria (germs). These bacteria enter the urinary tract. The bacteria may come from outside the body. Or they may travel from the skin outside the rectum or vagina into the urethra. Female anatomy makes it easy for bacteria from the bowel to  enter a womans urinary tract, which is the most common source of UTI. This means women develop UTIs more often than men. Pain in or around the urinary tract is a common UTI symptom. But the only way to know for sure if you have a UTI for the healthcare provider to test your urine. The two tests that may be done are the urinalysis and urine culture.  Types of UTIs  · Cystitis: A bladder infection (cystitis) is the most common UTI in women. You may have urgent or frequent urination. You may also have pain, burning when you urinate, and bloody urine.  · Urethritis: This is an inflamed urethra, which is the tube that carries urine from the bladder to outside the body. You may have lower stomach or back pain. You may also have urgent or frequent urination.  · Pyelonephritis: This is a kidney infection. If not treated, it can be serious and damage your kidneys. In severe cases, you may be hospitalized. You may have a fever and lower back pain.  Medicines to treat a UTI  Most UTIs are treated with antibiotics. These kill the bacteria. The length of time you need to take them depends on the type of infection. It may be as short as 3 days. If you have repeated UTIs, a low-dose antibiotic may be needed for several months. Take antibiotics exactly as directed. Dont stop taking them until all of the medicine is gone. If you stop taking the antibiotic too soon, the infection may not go away, and you may develop a resistance to the antibiotic. This can make it much harder to treat.  Lifestyle changes to treat and prevent UTIs  The lifestyle changes below will help get rid of your UTI. They may also help prevent future UTIs.  · Drink plenty of fluids. This includes water, juice, or other caffeine-free drinks. Fluids help flush bacteria out of your body.  · Empty your bladder. Always empty your bladder when you feel the urge to urinate. And always urinate before going to sleep. Urine that stays in your bladder can lead to  infection. Try to urinate before and after sex as well.  · Practice good personal hygiene. Wipe yourself from front to back after using the toilet. This helps keep bacteria from getting into the urethra.  · Use condoms during sex. These help prevent UTIs caused by sexually transmitted bacteria. Also, avoid using spermicides during sex. These can increase the risk of UTIs. Choose other forms of birth control instead. For women who tend to get UTIs after sex, a low-dose of a preventive antibiotic may be used. Be sure to discuss this option with your healthcare provider.  · Follow up with your healthcare provider as directed. He or she may test to make sure the infection has cleared. If needed, more treatment may be started.  Date Last Reviewed: 1/1/2017  © 5323-7608 [employer]. [address] [place] [address], [place] [address]. All rights reserved. This information is not intended as a substitute for professional medical care. Always follow your healthcare professional's instructions.

## 2019-07-10 NOTE — PROGRESS NOTES
Subjective:       Patient ID: Alina Rendon is a 53 y.o. female.    Vitals:  weight is 64.9 kg (143 lb). Her temperature is 98 °F (36.7 °C). Her blood pressure is 119/69 and her pulse is 77. Her oxygen saturation is 98%.     Chief Complaint: Urinary Tract Infection    Pt presents with dysuria, urgenc, and hematuria for the past few days.  No flank pain, fever, nausea, vomiting.    Urinary Tract Infection    This is a new problem. The current episode started in the past 7 days. The problem has been gradually worsening. The quality of the pain is described as aching. The pain is at a severity of 2/10. The pain is mild. There has been no fever. Associated symptoms include urgency. Pertinent negatives include no chills, flank pain, frequency, hematuria, nausea, vomiting or rash.       Constitution: Negative for chills, sweating, fatigue and fever.   Neck: Negative for painful lymph nodes.   Gastrointestinal: Negative for abdominal pain, nausea and vomiting.   Genitourinary: Positive for dysuria and urgency. Negative for frequency, urine decreased, flank pain, bladder incontinence, bed wetting, hematuria, history of kidney stones, painful menstruation, irregular menstruation, missed menses, heavy menstrual bleeding, ovarian cysts, genital trauma, vaginal pain, vaginal discharge, vaginal bleeding, vaginal odor, painful intercourse, genital sore, painful ejaculation and pelvic pain.   Musculoskeletal: Negative for back pain.   Skin: Negative for rash and lesion.   Hematologic/Lymphatic: Negative for swollen lymph nodes.       Objective:      Physical Exam   Constitutional: She is oriented to person, place, and time. She appears well-developed and well-nourished. She is cooperative.  Non-toxic appearance. She does not have a sickly appearance. She does not appear ill. No distress.   HENT:   Head: Normocephalic and atraumatic.   Right Ear: Hearing, tympanic membrane, external ear and ear canal normal.   Left Ear: Hearing,  tympanic membrane, external ear and ear canal normal.   Nose: Nose normal. No mucosal edema, rhinorrhea or nasal deformity. No epistaxis. Right sinus exhibits no maxillary sinus tenderness and no frontal sinus tenderness. Left sinus exhibits no maxillary sinus tenderness and no frontal sinus tenderness.   Mouth/Throat: Uvula is midline, oropharynx is clear and moist and mucous membranes are normal. No trismus in the jaw. Normal dentition. No uvula swelling. No posterior oropharyngeal erythema.   Eyes: Conjunctivae and lids are normal. No scleral icterus.   Sclera clear bilat   Neck: Trachea normal, normal range of motion, full passive range of motion without pain and phonation normal. Neck supple.   Cardiovascular: Normal rate, regular rhythm, normal heart sounds, intact distal pulses and normal pulses.   Pulmonary/Chest: Effort normal and breath sounds normal. No respiratory distress.   Abdominal: Soft. Normal appearance and bowel sounds are normal. She exhibits no distension and no mass. There is no tenderness. There is no rigidity, no rebound, no guarding, no CVA tenderness, no tenderness at McBurney's point and negative Hansen's sign.   No CVA or suprapubic tenderness.   Musculoskeletal: Normal range of motion. She exhibits no edema or deformity.   Neurological: She is alert and oriented to person, place, and time. She exhibits normal muscle tone. Coordination normal.   Skin: Skin is warm, dry and intact. She is not diaphoretic. No pallor.   Psychiatric: She has a normal mood and affect. Her speech is normal and behavior is normal. Judgment and thought content normal. Cognition and memory are normal.   Nursing note and vitals reviewed.        Results for orders placed or performed in visit on 07/10/19   POCT Urinalysis, Dipstick, Automated, W/O Scope   Result Value Ref Range    POC Blood, Urine Positive (A) Negative    POC Bilirubin, Urine Negative Negative    POC Urobilinogen, Urine norm 0.1 - 1.1    POC  Ketones, Urine Negative Negative    POC Protein, Urine Positive (A) Negative    POC Nitrates, Urine Negative Negative    POC Glucose, Urine Negative Negative    pH, UA 6.0 5 - 8    POC Specific Gravity, Urine 1.015 1.003 - 1.029    POC Leukocytes, Urine Positive (A) Negative       Assessment:       1. Acute cystitis with hematuria        Plan:         Acute cystitis with hematuria  -     POCT Urinalysis, Dipstick, Automated, W/O Scope  -     nitrofurantoin, macrocrystal-monohydrate, (MACROBID) 100 MG capsule; Take 1 capsule (100 mg total) by mouth 2 (two) times daily. for 5 days  Dispense: 10 capsule; Refill: 0  -     phenazopyridine (PYRIDIUM) 200 MG tablet; Take 1 tablet (200 mg total) by mouth 2 (two) times daily as needed for Pain.  Dispense: 6 tablet; Refill: 0      Patient Instructions   - Rest.    - Drink plenty of fluids.    - Tylenol or Ibuprofen as directed as needed for fever/pain. Avoid tylenol if you have a history of liver disease. Do not take ibuprofen if you have a history of GI bleeding, kidney disease, or if you take blood thinners.     - I prescribed phenazopyridine for symptom relief of burning with urination.  If this medication is not covered by insurance, You can take over the counter AZO as directed for discomfort with urination.       - You have been given an antibiotic to treat your condition today.    - Please complete the antibiotic as directed on the bottle.   - If you are female and on oral birth control pills, use additional methods to prevent pregnancy while on antibiotics and for one cycle after.   - you can take over-the-counter probiotics during and after antibiotic use to help preserve gut jose alejandro and reduce gastrointestinal symptoms    - Follow up with your PCP or specialty clinic as directed in the next 3-4 days if not improved or as needed.  You can call [phone] to schedule an appointment with the appropriate provider.    - Go to the ER or seek medical attention  immediately if you develop new or worsening symptoms.    - You must understand that you have received an Urgent Care treatment only and that you may be released before all of your medical problems are known or treated.   - You, the patient, will arrange for follow up care as instructed.   - If your condition worsens or fails to improve we recommend that you receive another evaluation at the ER immediately or contact your PCP to discuss your concerns or return here.         Urinary Tract Infections in Women    Urinary tract infections (UTIs) are most often caused by bacteria (germs). These bacteria enter the urinary tract. The bacteria may come from outside the body. Or they may travel from the skin outside the rectum or vagina into the urethra. Female anatomy makes it easy for bacteria from the bowel to enter a womans urinary tract, which is the most common source of UTI. This means women develop UTIs more often than men. Pain in or around the urinary tract is a common UTI symptom. But the only way to know for sure if you have a UTI for the healthcare provider to test your urine. The two tests that may be done are the urinalysis and urine culture.  Types of UTIs  · Cystitis: A bladder infection (cystitis) is the most common UTI in women. You may have urgent or frequent urination. You may also have pain, burning when you urinate, and bloody urine.  · Urethritis: This is an inflamed urethra, which is the tube that carries urine from the bladder to outside the body. You may have lower stomach or back pain. You may also have urgent or frequent urination.  · Pyelonephritis: This is a kidney infection. If not treated, it can be serious and damage your kidneys. In severe cases, you may be hospitalized. You may have a fever and lower back pain.  Medicines to treat a UTI  Most UTIs are treated with antibiotics. These kill the bacteria. The length of time you need to take them depends on the type of infection. It may be as  short as 3 days. If you have repeated UTIs, a low-dose antibiotic may be needed for several months. Take antibiotics exactly as directed. Dont stop taking them until all of the medicine is gone. If you stop taking the antibiotic too soon, the infection may not go away, and you may develop a resistance to the antibiotic. This can make it much harder to treat.  Lifestyle changes to treat and prevent UTIs  The lifestyle changes below will help get rid of your UTI. They may also help prevent future UTIs.  · Drink plenty of fluids. This includes water, juice, or other caffeine-free drinks. Fluids help flush bacteria out of your body.  · Empty your bladder. Always empty your bladder when you feel the urge to urinate. And always urinate before going to sleep. Urine that stays in your bladder can lead to infection. Try to urinate before and after sex as well.  · Practice good personal hygiene. Wipe yourself from front to back after using the toilet. This helps keep bacteria from getting into the urethra.  · Use condoms during sex. These help prevent UTIs caused by sexually transmitted bacteria. Also, avoid using spermicides during sex. These can increase the risk of UTIs. Choose other forms of birth control instead. For women who tend to get UTIs after sex, a low-dose of a preventive antibiotic may be used. Be sure to discuss this option with your healthcare provider.  · Follow up with your healthcare provider as directed. He or she may test to make sure the infection has cleared. If needed, more treatment may be started.  Date Last Reviewed: 1/1/2017  © 0961-3595 [employer]. [address] [place] [address], [place] [address]. All rights reserved. This information is not intended as a substitute for professional medical care. Always follow your healthcare professional's instructions.

## 2019-08-19 ENCOUNTER — PATIENT OUTREACH (OUTPATIENT)
Dept: ADMINISTRATIVE | Facility: HOSPITAL | Age: 54
End: 2019-08-19

## 2019-08-19 DIAGNOSIS — Z11.59 ENCOUNTER FOR HEPATITIS C SCREENING TEST FOR LOW RISK PATIENT: Primary | ICD-10-CM

## 2019-08-21 ENCOUNTER — PATIENT OUTREACH (OUTPATIENT)
Dept: ADMINISTRATIVE | Facility: HOSPITAL | Age: 54
End: 2019-08-21

## 2019-08-21 ENCOUNTER — TELEPHONE (OUTPATIENT)
Dept: ADMINISTRATIVE | Facility: HOSPITAL | Age: 54
End: 2019-08-21

## 2019-08-23 DIAGNOSIS — Z12.39 BREAST CANCER SCREENING: ICD-10-CM

## 2019-09-03 ENCOUNTER — OFFICE VISIT (OUTPATIENT)
Dept: FAMILY MEDICINE | Facility: CLINIC | Age: 54
End: 2019-09-03
Payer: MEDICARE

## 2019-09-03 VITALS
DIASTOLIC BLOOD PRESSURE: 72 MMHG | HEART RATE: 86 BPM | OXYGEN SATURATION: 97 % | BODY MASS INDEX: 24.1 KG/M2 | HEIGHT: 66 IN | SYSTOLIC BLOOD PRESSURE: 110 MMHG | TEMPERATURE: 99 F | WEIGHT: 149.94 LBS

## 2019-09-03 DIAGNOSIS — G61.81 CIDP (CHRONIC INFLAMMATORY DEMYELINATING POLYNEUROPATHY): Primary | ICD-10-CM

## 2019-09-03 DIAGNOSIS — G60.9 NEUROPATHY, IDIOPATHIC: ICD-10-CM

## 2019-09-03 DIAGNOSIS — E55.9 VITAMIN D DEFICIENCY DISEASE: ICD-10-CM

## 2019-09-03 DIAGNOSIS — Z86.39 HX OF GRAVES' DISEASE: ICD-10-CM

## 2019-09-03 DIAGNOSIS — E89.0 POSTABLATIVE HYPOTHYROIDISM: ICD-10-CM

## 2019-09-03 DIAGNOSIS — E05.00 GRAVES' ORBITOPATHY: ICD-10-CM

## 2019-09-03 PROBLEM — Z12.11 SCREENING FOR COLORECTAL CANCER: Status: ACTIVE | Noted: 2019-09-03

## 2019-09-03 PROBLEM — Z12.12 SCREENING FOR COLORECTAL CANCER: Status: ACTIVE | Noted: 2019-09-03

## 2019-09-03 PROCEDURE — 99213 OFFICE O/P EST LOW 20 MIN: CPT | Mod: PBBFAC,PO | Performed by: INTERNAL MEDICINE

## 2019-09-03 PROCEDURE — 99205 OFFICE O/P NEW HI 60 MIN: CPT | Mod: S$GLB,,, | Performed by: INTERNAL MEDICINE

## 2019-09-03 PROCEDURE — 99999 PR PBB SHADOW E&M-EST. PATIENT-LVL III: ICD-10-PCS | Mod: PBBFAC,,, | Performed by: INTERNAL MEDICINE

## 2019-09-03 PROCEDURE — 99205 PR OFFICE/OUTPT VISIT, NEW, LEVL V, 60-74 MIN: ICD-10-PCS | Mod: S$GLB,,, | Performed by: INTERNAL MEDICINE

## 2019-09-03 PROCEDURE — 99999 PR PBB SHADOW E&M-EST. PATIENT-LVL III: CPT | Mod: PBBFAC,,, | Performed by: INTERNAL MEDICINE

## 2019-09-03 RX ORDER — GABAPENTIN 600 MG/1
1200 TABLET ORAL 3 TIMES DAILY
Refills: 2 | COMMUNITY
Start: 2019-08-08 | End: 2024-02-15

## 2019-09-03 RX ORDER — NAPROXEN SODIUM 500 MG/1
250 TABLET, FILM COATED, EXTENDED RELEASE ORAL
COMMUNITY
End: 2024-02-15

## 2019-09-03 NOTE — PROGRESS NOTES
Chief complaint:  Establish care, physical    53-year-old white female here with her sister-in-law.  She is establishing care.  She is followed by neurologist and has difficulty seeing endocrinology for her hypothyroidism.  She is followed by Neurology for plasmapheresis for her neuropathy.  She now has a port in the right chest.  Her last mammogram was likely 2016 and she will discuss with the port nurses issues related to getting a mammogram and so forth.  She is up-to-date on her colon cancer screening.  I reviewed all available records.  She does have footdrop and is fearful of falls but not wearing her braces and encouraged her to do so as she has had some falls just with minimal transfers due to the ankles twisting and so forth.  She is on brand name Synthroid having had difficulties with regulation in the past.  She has difficulty seeing Endocrine and asks if we can assume management which should be no problem.  She has some remote vitamin-D deficiency.  She does need labs in her insurance mandates that she gets them at [facility].  He is also planning to have cataract surgery in the next 2 weeks some both eyes.  She has a form to complete which was done.  She has had no prior surgical history although did not remember the apparently she did have some surgery as below.    Regarding her neuropathy, she is currently getting plasmapheresis having completed and failed to respond IVIG.  She has generalized weakness and footdrop.  No trouble with breathing or swallowing.      ROS:   CONST: weight stable. EYES: no vision change. ENT: no sore throat. CV: no chest pain w/ exertion. RESP: no shortness of breath. GI: no nausea, vomiting, diarrhea. No dysphagia. : no urinary issues. MUSCULOSKELETAL: no new myalgias or arthralgias. SKIN: no new changes. NEURO: no focal deficits. PSYCH: no new issues. ENDOCRINE: no polyuria. HEME: no lymph nodes. ALLERGY: no general pruritis.    Past Medical History:   Diagnosis Date    CIDP  (chronic inflammatory demyelinating polyneuropathy) 2012    Grave's disease     s/p I-131 tx and Orbitopathy    Hypothyroidism     s/p I-1 31 tx    Neuropathy     Autoimmune etiology ( CIDP)    Screening for colorectal cancer 9/3/2019     normal 6/18, 10 years    Spondylolisthesis     Vitamin D deficiency      .  Past Surgical History:   Procedure Laterality Date    DILATION AND CURETTAGE OF UTERUS      ENDOMETRIAL ABLATION  10/27/2006    Thermachoice ablation    fractional D&C  10/2006    with ThermaChoice ablation     TEMPOROMANDIBULAR JOINT SURGERY      with skin grafts from Suprapubic area     Social History     Socioeconomic History    Marital status:      Spouse name: Not on file    Number of children: None    Years of education: Not on file    Highest education level: Not on file   Occupational History    Disabled   Social Needs    Financial resource strain: Not on file    Food insecurity:     Worry: Not on file     Inability: Not on file    Transportation needs:     Medical: Not on file     Non-medical: Not on file   Tobacco Use    Smoking status: Former Smoker, quit 10 years ago     Packs/day: 1.00     Years: 30.00     Pack years: 30.00     Types: Cigarettes     Last attempt to quit: 2010     Years since quittin.7    Smokeless tobacco: Never Used   Substance and Sexual Activity    Alcohol use: Yes     Comment: rarely    Drug use: No    Sexual activity: Yes     Partners: Male     Birth control/protection: None     Comment:  since : spouse: Gustavo   Nya    Physical activity:     Days per week: Not on file     Minutes per session: Not on file    Stress: Not on file   Relationships    Social connections:     Talks on phone: Not on file     Gets together: Not on file     Attends Hindu service: Not on file     Active member of club or organization: Not on file     Attends meetings of clubs or organizations: Not on file     Relationship status: Not on  file   Other Topics Concern    Not on file   Social History Narrative    Not on file     family history includes Arthritis in her mother; Diabetes in her father; Hyperlipidemia in her father and mother; Thyroid disease in her mother.    Gen: no distress  EYES: conjunctiva clear, non-icteric, PERRL  ENT: nose clear, nasal mucosa normal, oropharynx clear and moist, teeth good  NECK:supple, thyroid non-palpable  RESP: effort is good, lungs clear  CV: heart RRR w/o murmur, gallops or rubs; no carotid bruits, no edema  GI: abdomen soft, non-distended, non-tender, no hepatosplenomegaly  MS: gait - she is in a wheelchair, no clubbing or cyanosis of the digits  Neurologic:  Some general tremor, flaccid feet  SKIN: no rashes, warm to touch    Alina was seen today for annual exam.    Diagnoses and all orders for this visit:    CIDP (chronic inflammatory demyelinating polyneuropathy) she now has a port in the right chest but still gets blood work done peripherally.  I will send to [facility].  She will inquire about whether she can get a mammogram or if any changes need to be made to get a mammogram given the port.  She will continue follow-up with Neurology.  She is up-to-date on colon cancer screening.  Essentially her physical was done today although her Medicare product currently does not cover a formal physical it was done either way.  She was counseled and we reviewed all of her interval records.  I am happy to assume management of her hypothyroidism and vitamin-D deficiency.Total time over 60 minutes with over 50% counseling.  -     Vitamin D; Future  -     Comprehensive metabolic panel; Future  -     CBC auto differential; Future  -     T4, free; Future  -     TSH; Future  -     Lipid panel; Future  -     Vitamin D  -     Comprehensive metabolic panel  -     CBC auto differential  -     T4, free  -     TSH  -     Lipid panel    Hx of Graves' disease, reassess, appears TSH has been stable the last couple of years on the  brand name Synthroid    Vitamin D deficiency disease, reassess  -     Vitamin D; Future  -     Vitamin D    Graves' orbitopathy, keep follow-up with Ophthalmology    Postablative hypothyroidism  -     Vitamin D; Future  -     Comprehensive metabolic panel; Future  -     CBC auto differential; Future  -     T4, free; Future  -     TSH; Future  -     Lipid panel; Future  -     Vitamin D  -     Comprehensive metabolic panel  -     CBC auto differential  -     T4, free  -     TSH  -     Lipid panel    Neuropathy, idiopathic, keep follow-up with Neurology.

## 2019-10-02 ENCOUNTER — TELEPHONE (OUTPATIENT)
Dept: FAMILY MEDICINE | Facility: CLINIC | Age: 54
End: 2019-10-02

## 2019-10-02 NOTE — TELEPHONE ENCOUNTER
----- Message from Tracy Hall sent at 10/2/2019  9:23 AM CDT -----  Contact: Self   Type: Patient Call Back    Who called: Send     What is the request in detail:patient states Lisa never received her lab orders. Please resend Lisa Cristina in [place]     Can the clinic reply by [facility]? No     Would the patient rather a call back or a response via [facility]?  Call     Best call back number:[phone]

## 2019-11-08 DIAGNOSIS — E89.0 POSTABLATIVE HYPOTHYROIDISM: ICD-10-CM

## 2019-11-08 RX ORDER — LEVOTHYROXINE SODIUM 100 UG/1
TABLET ORAL
Qty: 90 TABLET | Refills: 1 | OUTPATIENT
Start: 2019-11-08

## 2020-01-15 ENCOUNTER — OFFICE VISIT (OUTPATIENT)
Dept: OPHTHALMOLOGY | Facility: CLINIC | Age: 55
End: 2020-01-15
Payer: COMMERCIAL

## 2020-01-15 ENCOUNTER — TELEPHONE (OUTPATIENT)
Dept: OTOLARYNGOLOGY | Facility: CLINIC | Age: 55
End: 2020-01-15

## 2020-01-15 DIAGNOSIS — H53.413 CENTRAL SCOTOMA, BOTH EYES: ICD-10-CM

## 2020-01-15 DIAGNOSIS — E05.00 GRAVES' ORBITOPATHY: Primary | ICD-10-CM

## 2020-01-15 DIAGNOSIS — H47.20 OPTIC ATROPHY, BOTH EYES: ICD-10-CM

## 2020-01-15 PROCEDURE — 99999 PR PBB SHADOW E&M-EST. PATIENT-LVL III: ICD-10-PCS | Mod: PBBFAC,,, | Performed by: OPHTHALMOLOGY

## 2020-01-15 PROCEDURE — 92133 POSTERIOR SEGMENT OCT OPTIC NERVE(OCULAR COHERENCE TOMOGRAPHY) - OU - BOTH EYES: ICD-10-PCS | Mod: S$GLB,,, | Performed by: OPHTHALMOLOGY

## 2020-01-15 PROCEDURE — 92004 PR EYE EXAM, NEW PATIENT,COMPREHESV: ICD-10-PCS | Mod: S$GLB,,, | Performed by: OPHTHALMOLOGY

## 2020-01-15 PROCEDURE — 92004 COMPRE OPH EXAM NEW PT 1/>: CPT | Mod: S$GLB,,, | Performed by: OPHTHALMOLOGY

## 2020-01-15 PROCEDURE — 92133 CPTRZD OPH DX IMG PST SGM ON: CPT | Mod: S$GLB,,, | Performed by: OPHTHALMOLOGY

## 2020-01-15 PROCEDURE — 99999 PR PBB SHADOW E&M-EST. PATIENT-LVL III: CPT | Mod: PBBFAC,,, | Performed by: OPHTHALMOLOGY

## 2020-01-15 RX ORDER — GABAPENTIN 600 MG/1
3 TABLET, FILM COATED ORAL DAILY
COMMUNITY
Start: 2019-12-23

## 2020-01-15 NOTE — PROGRESS NOTES
HPI     Referred by Dr.Sharett Chang  Patient notice OU vision decreasing x year.  Hx of Graves. Hx of CIPD(autoimmune)  Occasional sharp eye pain.  Eye drops:AT prn OU  Cataract surg-07/2019  MRI done    I have personally interviewed the patient, reviewed the history and   examined the patient and agree with the technician's exam.    She has been noting a gradual decrease in color vision. Her vision is dim   and grainy.  History of Graves' disease treated 20 years ago with   radioactive iodine. MRI 1/2/2020 reviewed and shows enlargement of   extraocular muscles.     Last edited by Donte James MD on 1/15/2020 10:42 AM. (History)        ROS     Negative for: Endocrine    Last edited by Donte James MD on 1/15/2020 10:07 AM. (History)        Assessment /Plan     For exam results, see Encounter Report.    Graves' orbitopathy  -     Ambulatory Referral to ENT    Central scotoma, both eyes  -     Posterior Segment OCT Optic Nerve- Both eyes  -     Ambulatory Referral to ENT    Optic atrophy, both eyes  -     Ambulatory Referral to ENT      Ms. Sharma's MRI shows evidence of enlargement of extraocular muscles bilaterally. I am concerned that her Graves' disease is resulting in compression of the optic nerves with progressive optic nerve damage bilaterally. I have ordered a consult by Dr. Sykes in ENT at [facility] to see if he agrees and to see if he recommends orbital decompression. I will repeat her exam and visual fields in two months.

## 2020-01-15 NOTE — LETTER
[facility] - Ophthalmology  [address]  [facility] [address]  Phone: [phone]  Fax: [phone]   January 15, 2020    Charlene Dockery MD  [address] [place] [address] [address]  [place] [address]    Patient: Alina Rendon   MR Number: [id number]   YOB: 1965   Date of Visit: 1/15/2020       Dear Dr. Dockery:    Thank you for referring Alina Rendon to me for evaluation. Here is my assessment and plan of care:    Assessment:  /Plan     For exam results, see Encounter Report.    Graves' orbitopathy  -     Ambulatory Referral to ENT    Central scotoma, both eyes  -     Posterior Segment OCT Optic Nerve- Both eyes  -     Ambulatory Referral to ENT    Optic atrophy, both eyes  -     Ambulatory Referral to ENT      Ms. Sharma's MRI shows evidence of enlargement of extraocular muscles bilaterally. I am concerned that her Graves' disease is resulting in compression of the optic nerves with progressive optic nerve damage bilaterally. I have ordered a consult by Dr. Sykes in ENT at [facility] to see if he agrees and to see if he recommends orbital decompression. I will repeat her exam and visual fields in two months.          Plan:  For exam results, see Encounter Report.    Graves' orbitopathy  -     Ambulatory Referral to ENT    Central scotoma, both eyes  -     Posterior Segment OCT Optic Nerve- Both eyes  -     Ambulatory Referral to ENT    Optic atrophy, both eyes  -     Ambulatory Referral to ENT      Ms. Sharma's MRI shows evidence of enlargement of extraocular muscles bilaterally. I am concerned that her Graves' disease is resulting in compression of the optic nerves with progressive optic nerve damage bilaterally. I have ordered a consult by Dr. Sykes in ENT at [facility] to see if he agrees and to see if he recommends orbital decompression. I will repeat her exam and visual fields in two months.            Below you will find my full exam findings. If you have questions, please do  not hesitate to call me. I look forward to following Ms. Alina Rendon along with you.    Sincerely,          Donte James MD       CC  Lucho Sykes MD             Base Eye Exam     Visual Acuity (Snellen - Linear)       Right Left    Dist cc 20/60 -2 CF at 6'    Dist ph cc NI NI    Correction:  Glasses          Pupils       Dark Light Shape React APD    Right 4 4 Round Minimal +3    Left 4 4 Round Minimal +4          Visual Fields    Diffuse depression with greater deficit superior temporally in both eyes on confrontation.           Extraocular Movement       Right Left     Full, Ortho Full, Ortho          Neuro/Psych     Oriented x3:  Yes    Mood/Affect:  Normal          Dilation     Both eyes:  1% Mydriacyl, 2.5% Phenylephrine @ 10:46 AM            Additional Tests     Color       Right Left    Ishihara No control No control            Slit Lamp and Fundus Exam     External Exam       Right Left    External 2+ Proptosis, Prolapsed fat pads: Upper, Lower Normal    Palpebral fissure 10 mm 8 mm    MRD1 4.0 mm 3.5 mm    Superior scleral show 0.0 mm 0.0 mm    Inferior scleral show 0.0 mm 0.0 mm    Levator 18 mm 17 mm    Lagophthalmos 0 mm 0 mm          Exophthalmometry (Base: 106)       Right Left     18 15          Slit Lamp Exam       Right Left    Lids/Lashes Normal Normal    Conjunctiva/Sclera 2+ Injection over muscles 2+ Injection over muscles    Cornea Clear Clear    Anterior Chamber Deep and quiet Deep and quiet    Iris Round and reactive Round and reactive    Lens 1+ Nuclear sclerosis Posterior chamber intraocular lens    Vitreous Normal Normal          Fundus Exam       Right Left    Disc 2+ Pallor  temporally 4+ Pallor diffusely    C/D Ratio 0.4 0.4    Macula Normal Normal    Vessels Normal Normal    Periphery Normal Normal

## 2020-01-17 ENCOUNTER — TELEPHONE (OUTPATIENT)
Dept: OTOLARYNGOLOGY | Facility: CLINIC | Age: 55
End: 2020-01-17

## 2020-01-17 NOTE — TELEPHONE ENCOUNTER
Placed call into patient regarding the referral we received for to be seen in our clinic. Will try calling back on Monday once we have spoken with Dr Mcdonough

## 2020-01-20 DIAGNOSIS — Z86.39 HX OF GRAVES' DISEASE: ICD-10-CM

## 2020-01-20 DIAGNOSIS — H47.20 OPTIC ATROPHY, BOTH EYES: ICD-10-CM

## 2020-01-20 DIAGNOSIS — E05.00 GRAVES' ORBITOPATHY: Primary | ICD-10-CM

## 2020-01-20 DIAGNOSIS — J34.89 NASAL OBSTRUCTION: ICD-10-CM

## 2020-01-20 RX ORDER — LIDOCAINE HYDROCHLORIDE 10 MG/ML
1 INJECTION, SOLUTION EPIDURAL; INFILTRATION; INTRACAUDAL; PERINEURAL ONCE
Status: CANCELLED | OUTPATIENT
Start: 2020-01-20 | End: 2020-01-20

## 2020-01-20 RX ORDER — DEXAMETHASONE SODIUM PHOSPHATE 4 MG/ML
8 INJECTION, SOLUTION INTRA-ARTICULAR; INTRALESIONAL; INTRAMUSCULAR; INTRAVENOUS; SOFT TISSUE
Status: CANCELLED | OUTPATIENT
Start: 2020-01-20

## 2020-01-20 RX ORDER — OXYMETAZOLINE HCL 0.05 %
2 SPRAY, NON-AEROSOL (ML) NASAL
Status: CANCELLED | OUTPATIENT
Start: 2020-01-20

## 2020-01-21 ENCOUNTER — TELEPHONE (OUTPATIENT)
Dept: OTOLARYNGOLOGY | Facility: CLINIC | Age: 55
End: 2020-01-21

## 2020-01-21 DIAGNOSIS — E89.0 POSTABLATIVE HYPOTHYROIDISM: ICD-10-CM

## 2020-01-21 RX ORDER — LEVOTHYROXINE SODIUM 100 UG/1
TABLET ORAL
Qty: 90 TABLET | Refills: 1 | Status: SHIPPED | OUTPATIENT
Start: 2020-01-21 | End: 2020-07-31

## 2020-01-21 NOTE — TELEPHONE ENCOUNTER
----- Message from Donte Mcdonough MD sent at 1/20/2020  8:00 PM CST -----  Regarding: Scheduling for orbit decompression  In order to facilitate this referral who was sent for orbital decompression, and appears to need surgery sooner than later to avoid ophthalmologic consequences such as loss of vision, I have placed orders prior to her consult being scheduled with me:  1. CT medtronic sinuses ordered which should preferably be obtained prior to clinic visit with me, and must at least be done prior to planned surgery.  2. OR case request - I submitted a request for next available date of 2/18 as only one case is currently scheduled on that date.     Ideally we should get her in to see me for consult within the next 2 weeks.    Thank you!  Donte

## 2020-01-21 NOTE — TELEPHONE ENCOUNTER
Called Patient and scheduled her Consult appointment with Dr. Mcdonough to discuss Surgery.  I also scheduled her CT Medtronic Scan appointment and also her Post-op appts since Dr. Mcdonough scheduled her Surgery for Feb 18, 2020.  Patient has full understanding of all appointments and the importance of keeping these appts.

## 2020-01-24 ENCOUNTER — TELEPHONE (OUTPATIENT)
Dept: OTOLARYNGOLOGY | Facility: CLINIC | Age: 55
End: 2020-01-24

## 2020-01-24 NOTE — TELEPHONE ENCOUNTER
Spoke with  MsTrae Tootie and explained to her that I changed her CT appt due to Dr. Mcdonough needing to do a peer to peer.  I also explained to Dr. Mcdonough that on Tuesday he needs to do a peer to peer with Patients Insurance Company to get her CT approved. Patient had full understanding.        Spoke with Patient and explained to her that her Insurance Company denied her CT Scan.  I placed a message in to Dr. Mcdonough to do a peer to peer with the Insurance Company to see if we can get this approved.  I told the Patient that I will call her to let her know the outcome, but keep her appt for the CT Scan on Tuesday unless she hears back from me otherwise.

## 2020-01-27 ENCOUNTER — TELEPHONE (OUTPATIENT)
Dept: OTOLARYNGOLOGY | Facility: CLINIC | Age: 55
End: 2020-01-27

## 2020-01-27 NOTE — TELEPHONE ENCOUNTER
Spoke with  Ms. ReganSommers on Friday and explained to her that I changed her CT appt due to Dr. Mcdonough needing to do a peer to peer.  I also explained to Dr. Mcdonough that on Tuesday he needs to do a peer to peer with Patients Insurance Company to get her CT approved.

## 2020-01-27 NOTE — TELEPHONE ENCOUNTER
Patient was calling to verify appt dates and times for test, clinic, preop, surgery. Patient moved the time of her CT Scan Friday 1/31/20 to 230pm. Also provided all dates and times of upcoming appts. Provided patient with the phone number to schedule a preop appt with the hospital for the same day of her appt with Dr Mcdonough.

## 2020-01-27 NOTE — TELEPHONE ENCOUNTER
----- Message from Carlene Ortiz sent at 1/27/2020  8:16 AM CST -----  Contact: Self/  [phone]  Type: Patient Call Back    Who called:  Patient    What is the request in detail:  Patient would like staff (Thomas)  to give her a call regarding CT Scan.  Thank you    Would the patient rather a call back or a response via [facility]?   Call back    Best call back number:  [phone]

## 2020-01-30 ENCOUNTER — TELEPHONE (OUTPATIENT)
Dept: OTOLARYNGOLOGY | Facility: CLINIC | Age: 55
End: 2020-01-30

## 2020-01-30 NOTE — TELEPHONE ENCOUNTER
Called Patient to let her know that Dr. Mcdonough did a peer to peer with her Insurance Company and got the CT Sinus Approved for tomorrow's CT Scan.  Called Erica in Admit and she will run Insurance through again with the approval for the CT Scan.

## 2020-01-31 ENCOUNTER — HOSPITAL ENCOUNTER (OUTPATIENT)
Dept: RADIOLOGY | Facility: HOSPITAL | Age: 55
Discharge: HOME | End: 2020-01-31
Attending: OTOLARYNGOLOGY
Payer: COMMERCIAL

## 2020-01-31 DIAGNOSIS — E05.00 GRAVES' ORBITOPATHY: ICD-10-CM

## 2020-01-31 DIAGNOSIS — Z86.39 HX OF GRAVES' DISEASE: ICD-10-CM

## 2020-01-31 DIAGNOSIS — H47.20 OPTIC ATROPHY, BOTH EYES: ICD-10-CM

## 2020-01-31 PROCEDURE — 70486 CT MEDTRONIC SINUSES WITHOUT: ICD-10-PCS | Mod: 26,,, | Performed by: RADIOLOGY

## 2020-01-31 PROCEDURE — 70486 CT MAXILLOFACIAL W/O DYE: CPT | Mod: TC

## 2020-01-31 PROCEDURE — 70486 CT MAXILLOFACIAL W/O DYE: CPT | Mod: 26,,, | Performed by: RADIOLOGY

## 2020-02-03 ENCOUNTER — OFFICE VISIT (OUTPATIENT)
Dept: OTOLARYNGOLOGY | Facility: CLINIC | Age: 55
End: 2020-02-03
Payer: COMMERCIAL

## 2020-02-03 VITALS
HEIGHT: 66 IN | WEIGHT: 155 LBS | DIASTOLIC BLOOD PRESSURE: 66 MMHG | SYSTOLIC BLOOD PRESSURE: 106 MMHG | BODY MASS INDEX: 24.91 KG/M2

## 2020-02-03 DIAGNOSIS — J34.2 NASAL SEPTAL DEVIATION: ICD-10-CM

## 2020-02-03 DIAGNOSIS — J34.3 HYPERTROPHY OF INFERIOR NASAL TURBINATE: ICD-10-CM

## 2020-02-03 DIAGNOSIS — E05.00 GRAVES' OPHTHALMOPATHY: Primary | ICD-10-CM

## 2020-02-03 DIAGNOSIS — J34.89 NASAL OBSTRUCTION: ICD-10-CM

## 2020-02-03 PROCEDURE — 31231 NASAL ENDOSCOPY DX: CPT | Mod: S$GLB,,, | Performed by: OTOLARYNGOLOGY

## 2020-02-03 PROCEDURE — 31231 NASAL/SINUS ENDOSCOPY: ICD-10-PCS | Mod: S$GLB,,, | Performed by: OTOLARYNGOLOGY

## 2020-02-03 PROCEDURE — 99205 PR OFFICE/OUTPT VISIT, NEW, LEVL V, 60-74 MIN: ICD-10-PCS | Mod: 25,S$GLB,, | Performed by: OTOLARYNGOLOGY

## 2020-02-03 PROCEDURE — 3008F PR BODY MASS INDEX (BMI) DOCUMENTED: ICD-10-PCS | Mod: CPTII,S$GLB,, | Performed by: OTOLARYNGOLOGY

## 2020-02-03 PROCEDURE — 3008F BODY MASS INDEX DOCD: CPT | Mod: CPTII,S$GLB,, | Performed by: OTOLARYNGOLOGY

## 2020-02-03 PROCEDURE — 99205 OFFICE O/P NEW HI 60 MIN: CPT | Mod: 25,S$GLB,, | Performed by: OTOLARYNGOLOGY

## 2020-02-03 RX ORDER — FLUTICASONE PROPIONATE 50 MCG
2 SPRAY, SUSPENSION (ML) NASAL DAILY
Qty: 16 G | Refills: 2 | Status: SHIPPED | OUTPATIENT
Start: 2020-02-03 | End: 2020-05-03

## 2020-02-03 NOTE — H&P (VIEW-ONLY)
Subjective:      Alina Rendon is a 54 y.o. female who was referred to me by Dr. 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  Patient has noticed that her vision has been decreasing bilaterally over approximately last year.  She has a history of autoimmune Graves disease. In addition to her Graves disease she also has chronic inflammatory demyelinating polyneuropathy.  She has been evaluated by Ophthalmology in regard to her Graves ophthalmopathy and was referred for evaluation of orbital decompression based on their evaluation and recommendations.  These prior evaluations by Ophthalmology as well as relevant medical records and imaging were reviewed demonstrating concern for enlargement of the extraocular muscles bilaterally as consistent with Graves ophthalmopathy and resulting compression of the bilateral optic nerves causing progressive damage and loss of vision.  An MRI was obtained previously which was reviewed and demonstrated enlargement of the bilateral extraocular muscles.  Last evaluation by Ophthalmology was on January 15, 2020 and when seen by me today she reports no acute decline in vision or progression since then.  She does not have significant eye pain currently though does report occasional acute orbital pain.  She is using eyedrops as she was previously advised.  She does not have a history of significant sinusitis though she does report a history of chronic nasal congestion and partial obstruction.  Her difficulty with nasal breathing affects both sides of her nose. No significant history of epistaxis.  She does not use any current sinonasal medications regularly. The last course of antibiotics was a long time ago.  She does not regularly use nasal decongestant sprays. She does not recall previously having allergy testing. She has not had sinonasal surgery. She does not recall a  prior history of nasal trauma.    QOL assessment deferred.    Global QOL = 50%      Past Medical History  She has a past medical history of CIDP (chronic inflammatory demyelinating polyneuropathy), Grave's disease, Hypothyroidism, Neuropathy, Screening for colorectal cancer, Spondylolisthesis, and Vitamin D deficiency.    Past Surgical History  She has a past surgical history that includes Temporomandibular joint surgery; fractional D&C (10/2006); Dilation and curettage of uterus; Endometrial ablation (10/27/2006); Cataract extraction (Left); and Eye surgery (Left).    Family History  Her family history includes Arthritis in her mother; Diabetes in her father; Hyperlipidemia in her father and mother; Thyroid disease in her mother.    Social History  She reports that she quit smoking about 9 years ago. Her smoking use included cigarettes. She has a 30.00 pack-year smoking history. She has never used smokeless tobacco. She reports that she drinks alcohol. She reports that she does not use drugs.    Allergies  She has No Known Allergies.    Medications   She has a current medication list which includes the following prescription(s): duloxetine, gralise, hydroxyzine hcl, immune globulin,gamma(igg), naproxen sodium, synthroid, tramadol, vitamin d, fluticasone propionate, and gabapentin, and the following Facility-Administered Medications: dexamethasone, dexamethasone, fentanyl, lactated ringers, lidocaine (cardiac), lidocaine (pf) 10 mg/ml (1%), lidocaine (pf) 10 mg/ml (1%), midazolam, ondansetron, propofol, rocuronium, and succinylcholine.    Review of Systems  Review of Systems   Constitutional: Negative.    HENT: Negative.    Eyes: Positive for photophobia, pain, discharge and visual disturbance.   Respiratory: Negative.    Cardiovascular: Negative.    Gastrointestinal: Negative.    Endocrine: Positive for cold intolerance.   Genitourinary: Negative.    Musculoskeletal: Negative.    Skin: Negative.   "  Allergic/Immunologic: Negative.    Neurological: Negative.    Hematological: Negative.    Psychiatric/Behavioral: Negative.         Objective:     /66 (BP Location: Left arm, Patient Position: Sitting)   Ht 5' 6" (1.676 m)   Wt 70.3 kg (155 lb)   BMI 25.02 kg/m²        Constitutional:   Vital signs are normal. She appears well-developed and well-nourished. No distress. Normal speech.      Head:  Normocephalic and atraumatic. Head is without right periorbital erythema and without left periorbital erythema. No scars or skin lesions. Salivary glands normal.  Facial strength is normal.      Ears:  Hearing normal to normal and whispered voice; external ear normal without scars, lesions, or masses; ear canal, tympanic membrane, and middle ear normal..   Right Ear: No drainage. Tympanic membrane is not perforated, not retracted and not bulging. No middle ear effusion.   Left Ear: No drainage. Tympanic membrane is not perforated, not retracted and not bulging.  No middle ear effusion.     Nose:  Mucosal edema and septal deviation (Biphasic: Rightward caudal deviation and leftward mid to posterior deflection) present. No rhinorrhea, nose lacerations, sinus tenderness, nasal septal hematoma or polyps. No epistaxis.  No foreign bodies. Turbinate hypertrophy (Bilat ITH and middle turbinate enlargement).      Mouth/Throat  Oropharynx clear and moist without lesions or asymmetry, normal uvula midline and lips, teeth, and gums normal. Normal dentition. No uvula swelling or oral lesions. No oropharyngeal exudate, posterior oropharyngeal edema or posterior oropharyngeal erythema.     Neck:  Neck normal without thyromegaly masses, asymmetry, normal tracheal structure, crepitus, and tenderness, thyroid normal, trachea normal, phonation normal, full range of motion with neck supple and no adenopathy. No stridor present.     Cardiovascular:   Normal rate, regular rhythm, S1 normal, S2 normal and normal heart sounds.  Exam " reveals no decreased pulses.    No murmur heard.    Pulmonary/Chest:   Effort normal and breath sounds normal. No stridor. No respiratory distress. She has no decreased breath sounds. She has no wheezes.     Psychiatric:   She has a normal mood and affect. Her speech is normal and behavior is normal.     Neurological:   She has neurological normal, alert and oriented. No cranial nerve deficit or sensory deficit. Coordination and gait normal.     Skin:   No abrasions, lacerations, lesions, or rashes.       Procedure    Nasal endoscopy performed.  See procedure note.                                              Data Reviewed    WBC (K/uL)   Date Value   02/12/2020 4.29     Eosinophil% (%)   Date Value   02/12/2020 2.6     Eos # (K/uL)   Date Value   02/12/2020 0.1     Platelets (K/uL)   Date Value   02/12/2020 188     Glucose (mg/dL)   Date Value   02/12/2020 84     No results found for: IGE     I independently reviewed the images of the CT sinuses dated 1/31/2020. Pertinent findings include: Thickening of extraocular muscles consistent with Graves ophthalmopathy, septal deviation, turbinate hypertrophy. No paranasal sinus opacifications.    Radiology report reviewed:  Impression     Graves ophthalmopathy involving orbital musculature with proptosis.  No evidence for acute sinusitis.     I also reviewed MRI dated 10/1/2020 demonstrating most notably thickening of extraocular muscles consistent with Graves ophthalmopathy.    Radiology report reviewed:  IMPRESSION:   1.  FINDINGS SUGGEST BILATERAL THYROID OPHTHALMOPATHY INVOLVING THE   BILATERAL EXTRAOCULAR MUSCLES, MOST PROMINENT AT THE INFERIOR RECTI   MUSCLES WITHOUT DEFINITE EVIDENCE FOR PROPTOSIS OR ADDITIONAL ORBITAL   INVOLVEMENT.    2.  NO ACUTE INTRACRANIAL ABNORMALITY.      Past medical records were reviewed with data pertinent to the chief complaint summarized in the HPI. Data was obtained from records including specifically, the documentation from Dr. James  "on 1/15/2020 which was personally reviewed and describes the patient's ophthalmology evaluation and eye examinations.       Assessment:     1. Graves' ophthalmopathy    2. Nasal obstruction    3. Nasal septal deviation    4. Hypertrophy of inferior nasal turbinate         Plan:     I had a long discussion with the patient and her  regarding her condition and the further workup and management options.  Regarding her symptomatic chronic nasal congestion and partial obstruction she has findings of significant septal deviation and inferior turbinate hypertrophy, as well as some mucosal edema and enlargement of the middle turbinates right greater than left.  I have advised a trial of appropriate medical therapy for this and plan to re-evaluate response prior to upcoming surgery to determine response and indication for septoplasty and turbinate reduction for this. I have recommended medical management with a combination of nasal saline irrigations and inhaled nasal steroids (fluticasone). Regarding nasal saline irrigations, specific instructions were provided on acquiring and using this treatment. Explanation was given regarding performing these irrigations, and also provided as written instructions. Fluticasone nasal spray was prescribed and specific instructions also given on proper use to maximize nasal delivery of the medication. I emphasized the importance of daily use to achieve therapeutic effect, and that this medication is not intended as an occassional "as needed" treatment of symptoms. Written instructions regarding the use of fluticasone nasal spray were also provided. In regard to her Graves ophthalmopathy I have recommended bilateral orbital decompression by endoscopic approach. This is planned to be bilateral. This surgery was explained in detail, including anticipated risks and benefits of the procedures. I reviewed most recent imaging studies with the patient as well to explain the surgery in " detail and all relevant imaging findings. Potential septoplasty and turbinate reduction procedures were also discussed. All questions were answered. After a detailed discussion of this the patient wished to proceed and informed consent obtained. Surgery has been scheduled for 2/18/2020. Post-operative follow-up and cares were also discussed and this was arranged for the patient as well.       Donte Mcdonough MD    Rhinology, Allergy, and Sinus-Skull Base Surgery    Department of Otorhinolaryngology    [facility]    Phone  [phone]    Fax      [phone]

## 2020-02-03 NOTE — LETTER
February 13, 2020      Donte James MD  [address]  [facility] [address]           [facility] Otolaryngology  [address]   [place] [address]  Phone: [phone]          Patient: Alina Rendon   MR Number: [id number]   YOB: 1965   Date of Visit: 2/3/2020       Dear Dr. Donte James:    Thank you for referring Alina Rendon to me for evaluation. Attached you will find relevant portions of my assessment and plan of care.    If you have questions, please do not hesitate to call me. I look forward to following Alina Rendon along with you.    Sincerely,    Donte Mcdonough MD    Enclosure  CC:  No Recipients    If you would like to receive this communication electronically, please contact [email or web address] or [phone] to request more information on [facility] access.    For providers and/or their staff who would like to refer a patient to [facility], please contact us through our one-stop-shop provider referral line, [facility], at [phone].    If you feel you have received this communication in error or would no longer like to receive these types of communications, please e-mail [email or web address]

## 2020-02-03 NOTE — PROGRESS NOTES
Subjective:      Alina Rendon is a 54 y.o. female who was referred to me by Dr. 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  Patient has noticed that her vision has been decreasing bilaterally over approximately last year.  She has a history of autoimmune Graves disease. In addition to her Graves disease she also has chronic inflammatory demyelinating polyneuropathy.  She has been evaluated by Ophthalmology in regard to her Graves ophthalmopathy and was referred for evaluation of orbital decompression based on their evaluation and recommendations.  These prior evaluations by Ophthalmology as well as relevant medical records and imaging were reviewed demonstrating concern for enlargement of the extraocular muscles bilaterally as consistent with Graves ophthalmopathy and resulting compression of the bilateral optic nerves causing progressive damage and loss of vision.  An MRI was obtained previously which was reviewed and demonstrated enlargement of the bilateral extraocular muscles.  Last evaluation by Ophthalmology was on January 15, 2020 and when seen by me today she reports no acute decline in vision or progression since then.  She does not have significant eye pain currently though does report occasional acute orbital pain.  She is using eyedrops as she was previously advised.  She does not have a history of significant sinusitis though she does report a history of chronic nasal congestion and partial obstruction.  Her difficulty with nasal breathing affects both sides of her nose. No significant history of epistaxis.  She does not use any current sinonasal medications regularly. The last course of antibiotics was a long time ago.  She does not regularly use nasal decongestant sprays. She does not recall previously having allergy testing. She has not had sinonasal surgery. She does not recall a  prior history of nasal trauma.    QOL assessment deferred.    Global QOL = 50%      Past Medical History  She has a past medical history of CIDP (chronic inflammatory demyelinating polyneuropathy), Grave's disease, Hypothyroidism, Neuropathy, Screening for colorectal cancer, Spondylolisthesis, and Vitamin D deficiency.    Past Surgical History  She has a past surgical history that includes Temporomandibular joint surgery; fractional D&C (10/2006); Dilation and curettage of uterus; Endometrial ablation (10/27/2006); Cataract extraction (Left); and Eye surgery (Left).    Family History  Her family history includes Arthritis in her mother; Diabetes in her father; Hyperlipidemia in her father and mother; Thyroid disease in her mother.    Social History  She reports that she quit smoking about 9 years ago. Her smoking use included cigarettes. She has a 30.00 pack-year smoking history. She has never used smokeless tobacco. She reports that she drinks alcohol. She reports that she does not use drugs.    Allergies  She has No Known Allergies.    Medications   She has a current medication list which includes the following prescription(s): duloxetine, gralise, hydroxyzine hcl, immune globulin,gamma(igg), naproxen sodium, synthroid, tramadol, vitamin d, fluticasone propionate, and gabapentin, and the following Facility-Administered Medications: dexamethasone, dexamethasone, fentanyl, lactated ringers, lidocaine (cardiac), lidocaine (pf) 10 mg/ml (1%), lidocaine (pf) 10 mg/ml (1%), midazolam, ondansetron, propofol, rocuronium, and succinylcholine.    Review of Systems  Review of Systems   Constitutional: Negative.    HENT: Negative.    Eyes: Positive for photophobia, pain, discharge and visual disturbance.   Respiratory: Negative.    Cardiovascular: Negative.    Gastrointestinal: Negative.    Endocrine: Positive for cold intolerance.   Genitourinary: Negative.    Musculoskeletal: Negative.    Skin: Negative.   "  Allergic/Immunologic: Negative.    Neurological: Negative.    Hematological: Negative.    Psychiatric/Behavioral: Negative.         Objective:     /66 (BP Location: Left arm, Patient Position: Sitting)   Ht 5' 6" (1.676 m)   Wt 70.3 kg (155 lb)   BMI 25.02 kg/m²        Constitutional:   Vital signs are normal. She appears well-developed and well-nourished. No distress. Normal speech.      Head:  Normocephalic and atraumatic. Head is without right periorbital erythema and without left periorbital erythema. No scars or skin lesions. Salivary glands normal.  Facial strength is normal.      Ears:  Hearing normal to normal and whispered voice; external ear normal without scars, lesions, or masses; ear canal, tympanic membrane, and middle ear normal..   Right Ear: No drainage. Tympanic membrane is not perforated, not retracted and not bulging. No middle ear effusion.   Left Ear: No drainage. Tympanic membrane is not perforated, not retracted and not bulging.  No middle ear effusion.     Nose:  Mucosal edema and septal deviation (Biphasic: Rightward caudal deviation and leftward mid to posterior deflection) present. No rhinorrhea, nose lacerations, sinus tenderness, nasal septal hematoma or polyps. No epistaxis.  No foreign bodies. Turbinate hypertrophy (Bilat ITH and middle turbinate enlargement).      Mouth/Throat  Oropharynx clear and moist without lesions or asymmetry, normal uvula midline and lips, teeth, and gums normal. Normal dentition. No uvula swelling or oral lesions. No oropharyngeal exudate, posterior oropharyngeal edema or posterior oropharyngeal erythema.     Neck:  Neck normal without thyromegaly masses, asymmetry, normal tracheal structure, crepitus, and tenderness, thyroid normal, trachea normal, phonation normal, full range of motion with neck supple and no adenopathy. No stridor present.     Cardiovascular:   Normal rate, regular rhythm, S1 normal, S2 normal and normal heart sounds.  Exam " reveals no decreased pulses.    No murmur heard.    Pulmonary/Chest:   Effort normal and breath sounds normal. No stridor. No respiratory distress. She has no decreased breath sounds. She has no wheezes.     Psychiatric:   She has a normal mood and affect. Her speech is normal and behavior is normal.     Neurological:   She has neurological normal, alert and oriented. No cranial nerve deficit or sensory deficit. Coordination and gait normal.     Skin:   No abrasions, lacerations, lesions, or rashes.       Procedure    Nasal endoscopy performed.  See procedure note.                                              Data Reviewed    WBC (K/uL)   Date Value   02/12/2020 4.29     Eosinophil% (%)   Date Value   02/12/2020 2.6     Eos # (K/uL)   Date Value   02/12/2020 0.1     Platelets (K/uL)   Date Value   02/12/2020 188     Glucose (mg/dL)   Date Value   02/12/2020 84     No results found for: IGE     I independently reviewed the images of the CT sinuses dated 1/31/2020. Pertinent findings include: Thickening of extraocular muscles consistent with Graves ophthalmopathy, septal deviation, turbinate hypertrophy. No paranasal sinus opacifications.    Radiology report reviewed:  Impression     Graves ophthalmopathy involving orbital musculature with proptosis.  No evidence for acute sinusitis.     I also reviewed MRI dated 10/1/2020 demonstrating most notably thickening of extraocular muscles consistent with Graves ophthalmopathy.    Radiology report reviewed:  IMPRESSION:   1.  FINDINGS SUGGEST BILATERAL THYROID OPHTHALMOPATHY INVOLVING THE   BILATERAL EXTRAOCULAR MUSCLES, MOST PROMINENT AT THE INFERIOR RECTI   MUSCLES WITHOUT DEFINITE EVIDENCE FOR PROPTOSIS OR ADDITIONAL ORBITAL   INVOLVEMENT.    2.  NO ACUTE INTRACRANIAL ABNORMALITY.      Past medical records were reviewed with data pertinent to the chief complaint summarized in the HPI. Data was obtained from records including specifically, the documentation from Dr. James  "on 1/15/2020 which was personally reviewed and describes the patient's ophthalmology evaluation and eye examinations.       Assessment:     1. Graves' ophthalmopathy    2. Nasal obstruction    3. Nasal septal deviation    4. Hypertrophy of inferior nasal turbinate         Plan:     I had a long discussion with the patient and her  regarding her condition and the further workup and management options.  Regarding her symptomatic chronic nasal congestion and partial obstruction she has findings of significant septal deviation and inferior turbinate hypertrophy, as well as some mucosal edema and enlargement of the middle turbinates right greater than left.  I have advised a trial of appropriate medical therapy for this and plan to re-evaluate response prior to upcoming surgery to determine response and indication for septoplasty and turbinate reduction for this. I have recommended medical management with a combination of nasal saline irrigations and inhaled nasal steroids (fluticasone). Regarding nasal saline irrigations, specific instructions were provided on acquiring and using this treatment. Explanation was given regarding performing these irrigations, and also provided as written instructions. Fluticasone nasal spray was prescribed and specific instructions also given on proper use to maximize nasal delivery of the medication. I emphasized the importance of daily use to achieve therapeutic effect, and that this medication is not intended as an occassional "as needed" treatment of symptoms. Written instructions regarding the use of fluticasone nasal spray were also provided. In regard to her Graves ophthalmopathy I have recommended bilateral orbital decompression by endoscopic approach. This is planned to be bilateral. This surgery was explained in detail, including anticipated risks and benefits of the procedures. I reviewed most recent imaging studies with the patient as well to explain the surgery in " detail and all relevant imaging findings. Potential septoplasty and turbinate reduction procedures were also discussed. All questions were answered. After a detailed discussion of this the patient wished to proceed and informed consent obtained. Surgery has been scheduled for 2/18/2020. Post-operative follow-up and cares were also discussed and this was arranged for the patient as well.       Donte Mcdonough MD    Rhinology, Allergy, and Sinus-Skull Base Surgery    Department of Otorhinolaryngology    [facility]    Phone  [phone]    Fax      [phone]

## 2020-02-03 NOTE — PATIENT INSTRUCTIONS
"Information and instructions from your visit with me today:    · Start using fluticasone nasal spray:    This medication is the same as the Flonase brand nasal spray. Use this medication as instructed on the prescription, 2 sprays on each side of your nose once daily. You need to use this medication every day regardless of symptoms, as it takes time to work and get the benefits. It does not work on an "as needed" basis like taking a decongestant. Place the tip of the medication bottle in your nose and aim slightly up and out on each side to get medication high and deep into your nose and sinuses, and not have it all deposit in the very front of your nose. You can imagine aiming towards the back of your eyeball on each side for this, as opposed to straight back to the center of your nose and head.     · Start nasal irrigations with saline solution:    SALINE SINUS RINSE (Med Med brand): You should do a full bottle, half on one side of your nose and half on the other, 1-2 times per day (or more if able to, you cannot do this too much). Follow the instructions on the box: mix the salt packet with clean water (bottle, previously boiled, distilled, etc -- not tap water) to the line on the bottle to make the irrigation.      · Do not use nasal decongestant sprays such as Afrin or similar products.    It was nice meeting you today, and I look forward to helping you feel better soon. Please don't hesitate to call if you have any other questions or concerns, or if I can be of any assistance in the meantime.       Donte Mcdonough    [facility]    Phone  [phone]    Fax      [phone]        Donte Mcdonough MD  Rhinology, Sinus, and Skull Base Surgery  Department of Otorhinolaryngology        "

## 2020-02-12 ENCOUNTER — HOSPITAL ENCOUNTER (OUTPATIENT)
Dept: PREADMISSION TESTING | Facility: HOSPITAL | Age: 55
Discharge: HOME | End: 2020-02-12
Attending: OTOLARYNGOLOGY
Payer: COMMERCIAL

## 2020-02-12 VITALS
TEMPERATURE: 98 F | BODY MASS INDEX: 25.12 KG/M2 | HEART RATE: 98 BPM | DIASTOLIC BLOOD PRESSURE: 69 MMHG | RESPIRATION RATE: 16 BRPM | HEIGHT: 66 IN | OXYGEN SATURATION: 100 % | WEIGHT: 156.31 LBS | SYSTOLIC BLOOD PRESSURE: 106 MMHG

## 2020-02-12 DIAGNOSIS — Z01.818 PRE-OP TESTING: Primary | ICD-10-CM

## 2020-02-12 LAB
ANION GAP SERPL CALC-SCNC: 12 MMOL/L (ref 8–16)
BASOPHILS # BLD AUTO: 0.06 K/UL (ref 0–0.2)
BASOPHILS NFR BLD: 1.4 % (ref 0–1.9)
BUN SERPL-MCNC: 17 MG/DL (ref 6–20)
CALCIUM SERPL-MCNC: 9.5 MG/DL (ref 8.7–10.5)
CHLORIDE SERPL-SCNC: 107 MMOL/L (ref 95–110)
CO2 SERPL-SCNC: 22 MMOL/L (ref 23–29)
CREAT SERPL-MCNC: 0.7 MG/DL (ref 0.5–1.4)
DIFFERENTIAL METHOD: ABNORMAL
EOSINOPHIL # BLD AUTO: 0.1 K/UL (ref 0–0.5)
EOSINOPHIL NFR BLD: 2.6 % (ref 0–8)
ERYTHROCYTE [DISTWIDTH] IN BLOOD BY AUTOMATED COUNT: 12.7 % (ref 11.5–14.5)
EST. GFR  (AFRICAN AMERICAN): >60 ML/MIN/1.73 M^2
EST. GFR  (NON AFRICAN AMERICAN): >60 ML/MIN/1.73 M^2
GLUCOSE SERPL-MCNC: 84 MG/DL (ref 70–110)
HCT VFR BLD AUTO: 40.1 % (ref 37–48.5)
HGB BLD-MCNC: 12.8 G/DL (ref 12–16)
IMM GRANULOCYTES # BLD AUTO: 0.01 K/UL (ref 0–0.04)
IMM GRANULOCYTES NFR BLD AUTO: 0.2 % (ref 0–0.5)
LYMPHOCYTES # BLD AUTO: 1.1 K/UL (ref 1–4.8)
LYMPHOCYTES NFR BLD: 24.7 % (ref 18–48)
MCH RBC QN AUTO: 29.3 PG (ref 27–31)
MCHC RBC AUTO-ENTMCNC: 31.9 G/DL (ref 32–36)
MCV RBC AUTO: 92 FL (ref 82–98)
MONOCYTES # BLD AUTO: 0.5 K/UL (ref 0.3–1)
MONOCYTES NFR BLD: 11.7 % (ref 4–15)
NEUTROPHILS # BLD AUTO: 2.6 K/UL (ref 1.8–7.7)
NEUTROPHILS NFR BLD: 59.4 % (ref 38–73)
NRBC BLD-RTO: 0 /100 WBC
PLATELET # BLD AUTO: 188 K/UL (ref 150–350)
PMV BLD AUTO: 9.8 FL (ref 9.2–12.9)
POTASSIUM SERPL-SCNC: 4.3 MMOL/L (ref 3.5–5.1)
RBC # BLD AUTO: 4.37 M/UL (ref 4–5.4)
SODIUM SERPL-SCNC: 141 MMOL/L (ref 136–145)
WBC # BLD AUTO: 4.29 K/UL (ref 3.9–12.7)

## 2020-02-12 PROCEDURE — 93010 EKG 12-LEAD: ICD-10-PCS | Mod: ,,, | Performed by: INTERNAL MEDICINE

## 2020-02-12 PROCEDURE — 93010 ELECTROCARDIOGRAM REPORT: CPT | Mod: ,,, | Performed by: INTERNAL MEDICINE

## 2020-02-12 PROCEDURE — 93005 ELECTROCARDIOGRAM TRACING: CPT

## 2020-02-12 PROCEDURE — 80048 BASIC METABOLIC PNL TOTAL CA: CPT

## 2020-02-12 PROCEDURE — 85025 COMPLETE CBC W/AUTO DIFF WBC: CPT

## 2020-02-12 PROCEDURE — 36415 COLL VENOUS BLD VENIPUNCTURE: CPT

## 2020-02-12 NOTE — DISCHARGE INSTRUCTIONS
"  Your surgery is scheduled for _Tuesday Feb. 18, 2020_.    Call [phone] between 2 p.m. and 5 p.m. on   __Monday __ to find out your arrival time for the day of your surgery.      Please report to SAME DAY SURGERY UNIT on the 2nd FLOOR at _______ a.m.  Use front door entrance. The doors open at 0530 am.      If you need WHEELCHAIR assistance please call  [phone] from your cell phone or "0"  from the  hospital courtesy phone located to the right after you enter the hospital lobby.      INSTRUCTIONS IMPORTANT!!!  ¨ Do not eat or drink after 12 midnight-including water. OK to brush teeth, no   gum, candy or mints!    ¨ Take only these medicines with a small swallow of water-morning of surgery.  Take Gabapentin and Gralise with water.      _x___  Prep instructions:   SHOWER     _x___  Do not wear makeup, including mascara. WEARING EYE MAKEUP MAY   LEAD TO SERIOUS EYE INJURY during surgery.  _x___  No powder, lotions or creams to your body.  _x___  You may wear only deodorant on the day of surgery.  _x___  Please remove all jewelry, including piercings and leave at home.  _x___  No money or valuables needed. Please leave at home.  You may bring your  cell phone.  ____  Please bring any documents given by your doctor.  _x___  If going home the same day, arrange for a ride home. You will not be able to   drive if Anesthesia was used.  .  _x___  Wear loose fitting clothing. Allow for dressings, bandages.  _x___  Stop Aspirin, Ibuprofen, Motrin and Aleve at least 3-5 days before surgery, unless otherwise instructed by your doctor, or the nurse.              You MAY use Tylenol/acetaminophen until day of surgery.    _x___  Call MD for temperature above 101 degrees.        _x___ Stop taking any Fish Oil supplement or any Vitamins that contain Vitamin  E at least 5 days prior to surgery.              I have read or had read and explained to me, and understand the above information.  Additional comments or instructions:Please " call   [phone] if you have any questions regarding the instructions above.

## 2020-02-17 ENCOUNTER — ANESTHESIA EVENT (OUTPATIENT)
Dept: SURGERY | Facility: HOSPITAL | Age: 55
End: 2020-02-17
Payer: COMMERCIAL

## 2020-02-18 ENCOUNTER — ANESTHESIA (OUTPATIENT)
Dept: SURGERY | Facility: HOSPITAL | Age: 55
End: 2020-02-18
Payer: COMMERCIAL

## 2020-02-18 ENCOUNTER — HOSPITAL ENCOUNTER (OUTPATIENT)
Facility: HOSPITAL | Age: 55
Discharge: HOME | End: 2020-02-18
Attending: OTOLARYNGOLOGY | Admitting: OTOLARYNGOLOGY
Payer: COMMERCIAL

## 2020-02-18 VITALS
BODY MASS INDEX: 25.12 KG/M2 | SYSTOLIC BLOOD PRESSURE: 121 MMHG | HEIGHT: 66 IN | WEIGHT: 156.31 LBS | HEART RATE: 82 BPM | DIASTOLIC BLOOD PRESSURE: 60 MMHG | TEMPERATURE: 98 F | OXYGEN SATURATION: 99 % | RESPIRATION RATE: 18 BRPM

## 2020-02-18 DIAGNOSIS — Z86.39 HX OF GRAVES' DISEASE: ICD-10-CM

## 2020-02-18 DIAGNOSIS — E05.00 GRAVES' ORBITOPATHY: Primary | ICD-10-CM

## 2020-02-18 DIAGNOSIS — J34.3 HYPERTROPHY OF BOTH INFERIOR NASAL TURBINATES: ICD-10-CM

## 2020-02-18 DIAGNOSIS — J34.2 NASAL SEPTAL DEVIATION: ICD-10-CM

## 2020-02-18 DIAGNOSIS — J34.89 NASAL OBSTRUCTION: ICD-10-CM

## 2020-02-18 DIAGNOSIS — H47.20 OPTIC ATROPHY, BOTH EYES: ICD-10-CM

## 2020-02-18 DIAGNOSIS — J34.89 CONCHA BULLOSA: ICD-10-CM

## 2020-02-18 PROCEDURE — 88305 TISSUE EXAM BY PATHOLOGIST: CPT | Performed by: PATHOLOGY

## 2020-02-18 PROCEDURE — 31292 NSL/SINS NDSC MED/INF DCMPRN: CPT | Mod: 50,,, | Performed by: OTOLARYNGOLOGY

## 2020-02-18 PROCEDURE — D9220A PRA ANESTHESIA: ICD-10-PCS | Mod: ANES,,, | Performed by: ANESTHESIOLOGY

## 2020-02-18 PROCEDURE — 61782 PR STEREOTACTIC COMP ASSIST PROC,CRANIAL,EXTRADURAL: ICD-10-PCS | Mod: ,,, | Performed by: OTOLARYNGOLOGY

## 2020-02-18 PROCEDURE — 36000710: Performed by: OTOLARYNGOLOGY

## 2020-02-18 PROCEDURE — 71000016 HC POSTOP RECOV ADDL HR: Performed by: OTOLARYNGOLOGY

## 2020-02-18 PROCEDURE — 37000008 HC ANESTHESIA 1ST 15 MINUTES: Performed by: OTOLARYNGOLOGY

## 2020-02-18 PROCEDURE — D9220A PRA ANESTHESIA: Mod: ANES,,, | Performed by: ANESTHESIOLOGY

## 2020-02-18 PROCEDURE — 61782 SCAN PROC CRANIAL EXTRA: CPT | Mod: ,,, | Performed by: OTOLARYNGOLOGY

## 2020-02-18 PROCEDURE — 63600175 PHARM REV CODE 636 W HCPCS: Performed by: ANESTHESIOLOGY

## 2020-02-18 PROCEDURE — 31292 PR ENDOSCOPY, NASAL/SINUS, ORBITAL DECOMP, MEDIAL OR INFERIOR WALL: ICD-10-PCS | Mod: 50,,, | Performed by: OTOLARYNGOLOGY

## 2020-02-18 PROCEDURE — C2625 STENT, NON-COR, TEM W/DEL SY: HCPCS | Performed by: OTOLARYNGOLOGY

## 2020-02-18 PROCEDURE — 27201423 OPTIME MED/SURG SUP & DEVICES STERILE SUPPLY: Performed by: OTOLARYNGOLOGY

## 2020-02-18 PROCEDURE — 71000015 HC POSTOP RECOV 1ST HR: Performed by: OTOLARYNGOLOGY

## 2020-02-18 PROCEDURE — 30140 PR EXCISION TURBINATE,SUBMUCOUS: ICD-10-PCS | Mod: 50,51,, | Performed by: OTOLARYNGOLOGY

## 2020-02-18 PROCEDURE — D9220A PRA ANESTHESIA: Mod: CRNA,,, | Performed by: NURSE ANESTHETIST, CERTIFIED REGISTERED

## 2020-02-18 PROCEDURE — 25000003 PHARM REV CODE 250: Performed by: NURSE ANESTHETIST, CERTIFIED REGISTERED

## 2020-02-18 PROCEDURE — 63600175 PHARM REV CODE 636 W HCPCS: Performed by: NURSE ANESTHETIST, CERTIFIED REGISTERED

## 2020-02-18 PROCEDURE — 31240 NSL/SNS NDSC CNCH BULL RESCJ: CPT | Mod: 51,RT,, | Performed by: OTOLARYNGOLOGY

## 2020-02-18 PROCEDURE — 37000009 HC ANESTHESIA EA ADD 15 MINS: Performed by: OTOLARYNGOLOGY

## 2020-02-18 PROCEDURE — 71000033 HC RECOVERY, INTIAL HOUR: Performed by: OTOLARYNGOLOGY

## 2020-02-18 PROCEDURE — C1894 INTRO/SHEATH, NON-LASER: HCPCS | Performed by: OTOLARYNGOLOGY

## 2020-02-18 PROCEDURE — D9220A PRA ANESTHESIA: ICD-10-PCS | Mod: CRNA,,, | Performed by: NURSE ANESTHETIST, CERTIFIED REGISTERED

## 2020-02-18 PROCEDURE — 30140 RESECT INFERIOR TURBINATE: CPT | Mod: 50,51,, | Performed by: OTOLARYNGOLOGY

## 2020-02-18 PROCEDURE — 36000711: Performed by: OTOLARYNGOLOGY

## 2020-02-18 PROCEDURE — 30520 PR REPAIR, NASAL SEPTUM: ICD-10-PCS | Mod: 51,,, | Performed by: OTOLARYNGOLOGY

## 2020-02-18 PROCEDURE — 30520 REPAIR OF NASAL SEPTUM: CPT | Mod: 51,,, | Performed by: OTOLARYNGOLOGY

## 2020-02-18 PROCEDURE — 63600175 PHARM REV CODE 636 W HCPCS: Performed by: OTOLARYNGOLOGY

## 2020-02-18 PROCEDURE — 88305 TISSUE EXAM BY PATHOLOGIST: ICD-10-PCS | Mod: 26,,, | Performed by: PATHOLOGY

## 2020-02-18 PROCEDURE — 25000003 PHARM REV CODE 250: Performed by: OTOLARYNGOLOGY

## 2020-02-18 PROCEDURE — 88305 TISSUE EXAM BY PATHOLOGIST: CPT | Mod: 26,,, | Performed by: PATHOLOGY

## 2020-02-18 PROCEDURE — 31240 PR NASAL/SINUS ENDOSCOPY,RMV CONCHA BULL: ICD-10-PCS | Mod: 51,RT,, | Performed by: OTOLARYNGOLOGY

## 2020-02-18 DEVICE — IMPLANT PROPEL MINI MOMETASONE: Type: IMPLANTABLE DEVICE | Site: NOSE | Status: FUNCTIONAL

## 2020-02-18 RX ORDER — ONDANSETRON 8 MG/1
8 TABLET, ORALLY DISINTEGRATING ORAL ONCE
Qty: 15 TABLET | Refills: 0 | Status: SHIPPED | OUTPATIENT
Start: 2020-02-18 | End: 2020-02-18

## 2020-02-18 RX ORDER — IBUPROFEN 600 MG/1
600 TABLET ORAL EVERY 6 HOURS PRN
Qty: 30 TABLET | Refills: 1 | Status: SHIPPED | OUTPATIENT
Start: 2020-02-18 | End: 2020-03-13

## 2020-02-18 RX ORDER — LIDOCAINE HYDROCHLORIDE AND EPINEPHRINE 10; 10 MG/ML; UG/ML
INJECTION, SOLUTION INFILTRATION; PERINEURAL
Status: DISCONTINUED | OUTPATIENT
Start: 2020-02-18 | End: 2020-02-18 | Stop reason: HOSPADM

## 2020-02-18 RX ORDER — HYDROMORPHONE HYDROCHLORIDE 2 MG/ML
0.2 INJECTION, SOLUTION INTRAMUSCULAR; INTRAVENOUS; SUBCUTANEOUS EVERY 5 MIN PRN
Status: DISCONTINUED | OUTPATIENT
Start: 2020-02-18 | End: 2020-02-18 | Stop reason: HOSPADM

## 2020-02-18 RX ORDER — SUCCINYLCHOLINE CHLORIDE 20 MG/ML
INJECTION INTRAMUSCULAR; INTRAVENOUS
Status: DISCONTINUED | OUTPATIENT
Start: 2020-02-18 | End: 2020-02-18

## 2020-02-18 RX ORDER — PREDNISONE 10 MG/1
TABLET ORAL
Qty: 24 TABLET | Refills: 0 | Status: SHIPPED | OUTPATIENT
Start: 2020-02-18 | End: 2024-02-15

## 2020-02-18 RX ORDER — OXYCODONE HYDROCHLORIDE 5 MG/1
5 TABLET ORAL EVERY 4 HOURS PRN
Qty: 15 TABLET | Refills: 0 | Status: SHIPPED | OUTPATIENT
Start: 2020-02-18 | End: 2023-06-09

## 2020-02-18 RX ORDER — ROCURONIUM BROMIDE 10 MG/ML
INJECTION, SOLUTION INTRAVENOUS
Status: DISCONTINUED | OUTPATIENT
Start: 2020-02-18 | End: 2020-02-18

## 2020-02-18 RX ORDER — FENTANYL CITRATE 50 UG/ML
INJECTION, SOLUTION INTRAMUSCULAR; INTRAVENOUS
Status: DISCONTINUED | OUTPATIENT
Start: 2020-02-18 | End: 2020-02-18

## 2020-02-18 RX ORDER — DEXAMETHASONE SODIUM PHOSPHATE 4 MG/ML
8 INJECTION, SOLUTION INTRA-ARTICULAR; INTRALESIONAL; INTRAMUSCULAR; INTRAVENOUS; SOFT TISSUE
Status: DISCONTINUED | OUTPATIENT
Start: 2020-02-18 | End: 2020-02-18 | Stop reason: HOSPADM

## 2020-02-18 RX ORDER — MIDAZOLAM HYDROCHLORIDE 1 MG/ML
INJECTION, SOLUTION INTRAMUSCULAR; INTRAVENOUS
Status: DISCONTINUED | OUTPATIENT
Start: 2020-02-18 | End: 2020-02-18

## 2020-02-18 RX ORDER — CEFAZOLIN SODIUM 2 G/50ML
2 SOLUTION INTRAVENOUS
Status: COMPLETED | OUTPATIENT
Start: 2020-02-18 | End: 2020-02-18

## 2020-02-18 RX ORDER — ACETAMINOPHEN 325 MG/1
650 TABLET ORAL EVERY 4 HOURS PRN
Status: DISCONTINUED | OUTPATIENT
Start: 2020-02-18 | End: 2020-02-18 | Stop reason: HOSPADM

## 2020-02-18 RX ORDER — PROPOFOL 10 MG/ML
VIAL (ML) INTRAVENOUS
Status: DISCONTINUED | OUTPATIENT
Start: 2020-02-18 | End: 2020-02-18

## 2020-02-18 RX ORDER — LIDOCAINE HYDROCHLORIDE 10 MG/ML
1 INJECTION, SOLUTION EPIDURAL; INFILTRATION; INTRACAUDAL; PERINEURAL ONCE
Status: DISCONTINUED | OUTPATIENT
Start: 2020-02-18 | End: 2020-02-18 | Stop reason: HOSPADM

## 2020-02-18 RX ORDER — EPINEPHRINE NASAL SOLUTION 1 MG/ML
SOLUTION NASAL
Status: DISCONTINUED | OUTPATIENT
Start: 2020-02-18 | End: 2020-02-18 | Stop reason: HOSPADM

## 2020-02-18 RX ORDER — ONDANSETRON 2 MG/ML
INJECTION INTRAMUSCULAR; INTRAVENOUS
Status: DISCONTINUED | OUTPATIENT
Start: 2020-02-18 | End: 2020-02-18

## 2020-02-18 RX ORDER — IBUPROFEN 600 MG/1
600 TABLET ORAL ONCE
Status: COMPLETED | OUTPATIENT
Start: 2020-02-18 | End: 2020-02-18

## 2020-02-18 RX ORDER — EPHEDRINE SULFATE 50 MG/ML
INJECTION, SOLUTION INTRAVENOUS
Status: DISCONTINUED | OUTPATIENT
Start: 2020-02-18 | End: 2020-02-18

## 2020-02-18 RX ORDER — AMOXICILLIN AND CLAVULANATE POTASSIUM 875; 125 MG/1; MG/1
1 TABLET, FILM COATED ORAL 2 TIMES DAILY
Qty: 20 TABLET | Refills: 0 | Status: SHIPPED | OUTPATIENT
Start: 2020-02-18 | End: 2020-03-03

## 2020-02-18 RX ORDER — PHENYLEPHRINE HYDROCHLORIDE 10 MG/ML
INJECTION INTRAVENOUS
Status: DISCONTINUED | OUTPATIENT
Start: 2020-02-18 | End: 2020-02-18

## 2020-02-18 RX ORDER — DEXAMETHASONE SODIUM PHOSPHATE 4 MG/ML
INJECTION, SOLUTION INTRA-ARTICULAR; INTRALESIONAL; INTRAMUSCULAR; INTRAVENOUS; SOFT TISSUE
Status: DISCONTINUED | OUTPATIENT
Start: 2020-02-18 | End: 2020-02-18

## 2020-02-18 RX ORDER — LIDOCAINE HCL/PF 100 MG/5ML
SYRINGE (ML) INTRAVENOUS
Status: DISCONTINUED | OUTPATIENT
Start: 2020-02-18 | End: 2020-02-18

## 2020-02-18 RX ORDER — MUPIROCIN 20 MG/G
OINTMENT TOPICAL
Status: DISCONTINUED | OUTPATIENT
Start: 2020-02-18 | End: 2020-02-18 | Stop reason: HOSPADM

## 2020-02-18 RX ORDER — SODIUM CHLORIDE, SODIUM LACTATE, POTASSIUM CHLORIDE, CALCIUM CHLORIDE 600; 310; 30; 20 MG/100ML; MG/100ML; MG/100ML; MG/100ML
INJECTION, SOLUTION INTRAVENOUS CONTINUOUS PRN
Status: DISCONTINUED | OUTPATIENT
Start: 2020-02-18 | End: 2020-02-18

## 2020-02-18 RX ORDER — SODIUM CHLORIDE, SODIUM LACTATE, POTASSIUM CHLORIDE, CALCIUM CHLORIDE 600; 310; 30; 20 MG/100ML; MG/100ML; MG/100ML; MG/100ML
INJECTION, SOLUTION INTRAVENOUS CONTINUOUS
Status: DISCONTINUED | OUTPATIENT
Start: 2020-02-18 | End: 2020-02-18 | Stop reason: HOSPADM

## 2020-02-18 RX ORDER — DEXMEDETOMIDINE HYDROCHLORIDE 100 UG/ML
INJECTION, SOLUTION INTRAVENOUS
Status: DISCONTINUED | OUTPATIENT
Start: 2020-02-18 | End: 2020-02-18

## 2020-02-18 RX ORDER — OXYMETAZOLINE HCL 0.05 %
2 SPRAY, NON-AEROSOL (ML) NASAL
Status: COMPLETED | OUTPATIENT
Start: 2020-02-18 | End: 2020-02-18

## 2020-02-18 RX ORDER — ONDANSETRON 2 MG/ML
4 INJECTION INTRAMUSCULAR; INTRAVENOUS DAILY PRN
Status: DISCONTINUED | OUTPATIENT
Start: 2020-02-18 | End: 2020-02-18 | Stop reason: HOSPADM

## 2020-02-18 RX ORDER — OXYCODONE HYDROCHLORIDE 5 MG/1
5 TABLET ORAL EVERY 4 HOURS PRN
Status: DISCONTINUED | OUTPATIENT
Start: 2020-02-18 | End: 2020-02-18 | Stop reason: HOSPADM

## 2020-02-18 RX ORDER — SODIUM CHLORIDE 0.9 % (FLUSH) 0.9 %
10 SYRINGE (ML) INJECTION
Status: DISCONTINUED | OUTPATIENT
Start: 2020-02-18 | End: 2020-02-18 | Stop reason: HOSPADM

## 2020-02-18 RX ORDER — ONDANSETRON 4 MG/1
8 TABLET, ORALLY DISINTEGRATING ORAL ONCE
Status: DISCONTINUED | OUTPATIENT
Start: 2020-02-18 | End: 2020-02-18 | Stop reason: HOSPADM

## 2020-02-18 RX ADMIN — DEXMEDETOMIDINE HYDROCHLORIDE 12 MCG: 100 INJECTION, SOLUTION INTRAVENOUS at 01:02

## 2020-02-18 RX ADMIN — PHENYLEPHRINE HYDROCHLORIDE 200 MCG: 10 INJECTION INTRAVENOUS at 11:02

## 2020-02-18 RX ADMIN — OXYCODONE 5 MG: 5 TABLET ORAL at 07:02

## 2020-02-18 RX ADMIN — Medication 2 SPRAY: at 10:02

## 2020-02-18 RX ADMIN — CEFAZOLIN SODIUM 2 G: 2 SOLUTION INTRAVENOUS at 03:02

## 2020-02-18 RX ADMIN — SUCCINYLCHOLINE CHLORIDE 140 MG: 20 INJECTION, SOLUTION INTRAMUSCULAR; INTRAVENOUS at 10:02

## 2020-02-18 RX ADMIN — SODIUM CHLORIDE, SODIUM LACTATE, POTASSIUM CHLORIDE, AND CALCIUM CHLORIDE: .6; .31; .03; .02 INJECTION, SOLUTION INTRAVENOUS at 05:02

## 2020-02-18 RX ADMIN — SODIUM CHLORIDE, SODIUM LACTATE, POTASSIUM CHLORIDE, AND CALCIUM CHLORIDE: .6; .31; .03; .02 INJECTION, SOLUTION INTRAVENOUS at 01:02

## 2020-02-18 RX ADMIN — ONDANSETRON 4 MG: 2 INJECTION, SOLUTION INTRAMUSCULAR; INTRAVENOUS at 11:02

## 2020-02-18 RX ADMIN — TRANEXAMIC ACID 1000 MG: 100 INJECTION, SOLUTION INTRAVENOUS at 05:02

## 2020-02-18 RX ADMIN — ROCURONIUM BROMIDE 10 MG: 10 INJECTION, SOLUTION INTRAVENOUS at 03:02

## 2020-02-18 RX ADMIN — DEXMEDETOMIDINE HYDROCHLORIDE 12 MCG: 100 INJECTION, SOLUTION INTRAVENOUS at 04:02

## 2020-02-18 RX ADMIN — FENTANYL CITRATE 100 MCG: 50 INJECTION INTRAMUSCULAR; INTRAVENOUS at 10:02

## 2020-02-18 RX ADMIN — PHENYLEPHRINE HYDROCHLORIDE 100 MCG: 10 INJECTION INTRAVENOUS at 03:02

## 2020-02-18 RX ADMIN — FENTANYL CITRATE 25 MCG: 50 INJECTION INTRAMUSCULAR; INTRAVENOUS at 05:02

## 2020-02-18 RX ADMIN — IBUPROFEN 600 MG: 600 TABLET, FILM COATED ORAL at 09:02

## 2020-02-18 RX ADMIN — SODIUM CHLORIDE, SODIUM LACTATE, POTASSIUM CHLORIDE, AND CALCIUM CHLORIDE: .6; .31; .03; .02 INJECTION, SOLUTION INTRAVENOUS at 10:02

## 2020-02-18 RX ADMIN — PHENYLEPHRINE HYDROCHLORIDE 100 MCG: 10 INJECTION INTRAVENOUS at 11:02

## 2020-02-18 RX ADMIN — DEXMEDETOMIDINE HYDROCHLORIDE 12 MCG: 100 INJECTION, SOLUTION INTRAVENOUS at 03:02

## 2020-02-18 RX ADMIN — PROPOFOL 50 MG: 10 INJECTION, EMULSION INTRAVENOUS at 02:02

## 2020-02-18 RX ADMIN — ROCURONIUM BROMIDE 20 MG: 10 INJECTION, SOLUTION INTRAVENOUS at 02:02

## 2020-02-18 RX ADMIN — DEXMEDETOMIDINE HYDROCHLORIDE 12 MCG: 100 INJECTION, SOLUTION INTRAVENOUS at 05:02

## 2020-02-18 RX ADMIN — EPHEDRINE SULFATE 5 MG: 50 INJECTION, SOLUTION INTRAMUSCULAR; INTRAVENOUS; SUBCUTANEOUS at 12:02

## 2020-02-18 RX ADMIN — DEXMEDETOMIDINE HYDROCHLORIDE 12 MCG: 100 INJECTION, SOLUTION INTRAVENOUS at 02:02

## 2020-02-18 RX ADMIN — CEFAZOLIN SODIUM 2 G: 2 SOLUTION INTRAVENOUS at 10:02

## 2020-02-18 RX ADMIN — DEXAMETHASONE SODIUM PHOSPHATE 8 MG: 4 INJECTION, SOLUTION INTRAMUSCULAR; INTRAVENOUS at 11:02

## 2020-02-18 RX ADMIN — PROPOFOL 150 MG: 10 INJECTION, EMULSION INTRAVENOUS at 10:02

## 2020-02-18 RX ADMIN — FENTANYL CITRATE 75 MCG: 50 INJECTION INTRAMUSCULAR; INTRAVENOUS at 05:02

## 2020-02-18 RX ADMIN — LIDOCAINE HYDROCHLORIDE 70 MG: 20 INJECTION, SOLUTION INTRAVENOUS at 10:02

## 2020-02-18 RX ADMIN — ROCURONIUM BROMIDE 5 MG: 10 INJECTION, SOLUTION INTRAVENOUS at 10:02

## 2020-02-18 RX ADMIN — ROCURONIUM BROMIDE 45 MG: 10 INJECTION, SOLUTION INTRAVENOUS at 11:02

## 2020-02-18 RX ADMIN — SUGAMMADEX 200 MG: 100 INJECTION, SOLUTION INTRAVENOUS at 05:02

## 2020-02-18 RX ADMIN — ROCURONIUM BROMIDE 10 MG: 10 INJECTION, SOLUTION INTRAVENOUS at 01:02

## 2020-02-18 RX ADMIN — EPHEDRINE SULFATE 5 MG: 50 INJECTION, SOLUTION INTRAMUSCULAR; INTRAVENOUS; SUBCUTANEOUS at 11:02

## 2020-02-18 RX ADMIN — MIDAZOLAM HYDROCHLORIDE 2 MG: 1 INJECTION, SOLUTION INTRAMUSCULAR; INTRAVENOUS at 10:02

## 2020-02-18 RX ADMIN — Medication 2 SPRAY: at 09:02

## 2020-02-18 NOTE — TRANSFER OF CARE
"Anesthesia Transfer of Care Note    Patient: Alina Rendon    Procedure(s) Performed: Procedure(s) (LRB):  SEPTOPLASTY, NOSE, ENDOSCOPIC (N/A)  EXCISION, NASAL TURBINATE, SUBMUCOSAL (Bilateral)  DECOMPRESSION, ORBIT (Bilateral)  FESS, USING COMPUTER-ASSISTED NAVIGATION (Bilateral)    Patient location: PACU    Anesthesia Type: general    Transport from OR: Transported from OR on 6-10 L/min O2 by face mask with adequate spontaneous ventilation    Post pain: adequate analgesia    Post assessment: no apparent anesthetic complications    Post vital signs: stable    Level of consciousness: sedated    Nausea/Vomiting: no nausea/vomiting    Complications: none    Transfer of care protocol was followed      Last vitals:   Visit Vitals  BP (!) 113/59 (BP Location: Left arm, Patient Position: Lying)   Pulse 80   Temp 36.5 °C (97.7 °F) (Oral)   Resp 16   Ht 5' 6" (1.676 m)   Wt 70.9 kg (156 lb 4.9 oz)   SpO2 95%   Breastfeeding? No   BMI 25.23 kg/m²     "

## 2020-02-18 NOTE — ANESTHESIA PREPROCEDURE EVALUATION
02/18/2020  Alina Rendon is a 54 y.o., female.    Anesthesia Evaluation     I have reviewed the Nursing Notes.      Review of Systems  Anesthesia Hx:  No problems with previous Anesthesia   Social:  Former Smoker    Cardiovascular:  Cardiovascular Normal Exercise tolerance: good     Pulmonary:  Pulmonary Normal    Renal/:  Renal/ Normal     Hepatic/GI:  Hepatic/GI Normal    Neurological:   Denies CVA. Neuromuscular Disease, (chronic demyelinating neuropathy)  Denies Seizures.    Endocrine:   Hypothyroidism        Physical Exam  General:  Well nourished    Airway/Jaw/Neck:  AIRWAY FINDINGS: Normal      Chest/Lungs:  Chest/Lungs Clear    Heart/Vascular:  Heart Findings: Normal       Mental Status:  Mental Status Findings: Normal        Anesthesia Plan  Type of Anesthesia, risks & benefits discussed:  Anesthesia Type:  general  Patient's Preference:   Intra-op Monitoring Plan: standard ASA monitors  Intra-op Monitoring Plan Comments:   Post Op Pain Control Plan:   Post Op Pain Control Plan Comments:   Induction:   IV  Beta Blocker:  Patient is not currently on a Beta-Blocker (No further documentation required).       Informed Consent: Patient understands risks and agrees with Anesthesia plan.  Questions answered. Anesthesia consent signed with patient.  ASA Score: 2     Day of Surgery Review of History & Physical:  There are no significant changes.  H&P update referred to the provider.         Ready For Surgery From Anesthesia Perspective.

## 2020-02-18 NOTE — PROCEDURES
Nasal/sinus endoscopy  Date/Time: 2/3/2020 8:30 AM  Performed by: Donte Mcdonough MD  Authorized by: Donte Mcdonough MD     Consent Done?:  Yes (Verbal)  Anesthesia:     Local anesthetic:  4% Xylocaine spray with Quincy-Synephrine    Patient tolerance:  Patient tolerated the procedure well with no immediate complications  Nose:     Procedure Performed:  Nasal Endoscopy  External:      No external nasal deformity  Intranasal:      Mucosa no polyps     Mucosa ulcers not present     No mucosa lesions present     Enlarged turbinates     Septum gross deformity  Nasopharynx:      No mucosa lesions     Adenoids not present     Posterior choanae patent     Eustachian tube patent       Descriptions and representative images of key findings from this diagnostic endoscopy procedure can be found in the associated clinic note of the same date of service. All uploaded images obtained during this exam may also be found in the media section of the patient's chart.        Donte Mcdonough MD    Rhinology, Allergy, and Sinus-Skull Base Surgery    Department of Otorhinolaryngology    [facility]    Phone  [phone]    Fax      [phone]

## 2020-02-19 ENCOUNTER — TELEPHONE (OUTPATIENT)
Dept: OTOLARYNGOLOGY | Facility: CLINIC | Age: 55
End: 2020-02-19

## 2020-02-19 NOTE — OP NOTE
Patient: Alina Rendon  MRN: [id number]    DOS: 2/18/2020    Attending Surgeon: Donte Mcdonough MD    OPERATIVE REPORT    Preoperative Diagnoses:   1. Graves' ophthalmopathy   2. Bilateral optic atrophy with vision loss  3. Nasal obstruction  4. Bilateral inferior turbinate hypertrophy  5. Septal deviation  6. Valerie bullosa    Postoperative Diagnoses:   1. Graves' ophthalmopathy   2. Bilateral optic atrophy with vision loss  3. Nasal obstruction  4. Bilateral inferior turbinate hypertrophy  5. Septal deviation  6. Valerie bullosa    Procedures Performed:   1. Bilateral endoscopic maxillary antrostomy  2. Bilateral endoscopic total ethmoidectomy  3. Bilateral endoscopic sphenoidotomy  4. Bilateral endoscopic frontal sinusotomy.  5. Bilateral orbital decompression with resection of medial orbital walls and periorbita  6. Right valerie bullosa resection  7. Bilateral partial resection of the inferior turbinates with therapeutic outfracture  8. Septoplasty  9. CT stereotactic navigational surgery    Indications: Alina Rendon is a 54 y.o. female with a history of Graves' disease and associated Graves' orbitopathy. she has developed progressive bilateral optic atrophy resulting in vision loss which has been evaluated by ophthalmology and has indicated surgical decompression of the bilateral orbits. Additionally, the patient has significant nasal congestion and partial nasal obstruction, with internal nasal valve compromise identified. Contributing sources of internal nasal valve compromise and partial obstruction were identified by exam, endoscopy, and CT imaging, to include inferior turbinate hypertrophy bilaterally, deviation of the nasal septum, and right valerie bullosa. Symptoms of nasal congestion and obstruction related to these causes were refractory to medical therapy. The degree of septal deviation and other sources of nasal obstruction present were also identified to represent potential impediments to sinus  drainage and delivery of topical nasal therapies. CT stereotactic navigation was indicated given the proximity of planned surgical procedures to the orbit and the cranial base and given the thoroughness of the surgery planned.    Findings: Ms. TAMMY Rendon had evidence of bilateral Graves' orbitopathy with increased volume and pressure of orbital contents. Endoscopic orbital decompression performed bilaterally with removal of the lamina papyracea posterior-to-anterior from the orbital apex just anterior to the sphenoid face, to just posterior to the lacrimal sac and duct. Incisions were made horizontally in the periorbita posterior-to-anterior to effectively allow controlled prolapse of orbital fat into the ethmoid and nasal cavity bilaterally. Care was taken to maintain patency of sinus outflow by avoiding obstruction with herniated orbital fat. Sources of nasal obstruction including septal deviation, valerie bullosa, and inferior turbinate hypertrophy were also addressed with excellent results in improving the nasal airway bilaterally. Diffuse oozing throughout these procedures was addressed with bipolar cautery and placement of absorbable hemostatic agents and packing. Excellent hemostasis was achieved with these interventions. Septal splints were placed bilaterally.     Specimens: Sinus contents sent for pathologic evaluation as separate left and right sided specimens.    Estimated Blood Loss: 200 mL.    Complications: None apparent.    Description of Procedure:   Patient was identified in the holding area and taken to the operating room, where he was placed in the supine position, and induced under general anesthesia per Anesthesiology. A formal surgical pause was held, and all aspects were addressed. The CT stereotactic navigational surgical system was placed about the patient's head, registered to appropriate level of accuracy, and verified using known anatomic landmarks. It was used throughout the case to help  identify critical structures, including the orbit and the cranial base, given the thoroughness of the surgery planned and procedures involving the medial orbits bilaterally.     The nose was topically decongested with 1:1,000 epinephrine (dyed with fluorescein) on neuropledgets. Patient was draped in the normal fashion. Zero and 30-degree endoscopes were used as indicated throughout the procedures. Injections of local anesthetic using 1% lidocaine with 1:100,000 epinephrine were performed in the nasal cavity bilaterally. Infiltration of this local anesthetic and epinephrine was also specifically performed to the area of the lateral nasal wall in proximity to the sphenopalatine artery to reduce bleeding and assist with pain control.     Septoplasty was addressed first. An incision was created in the left anterior septum and a left mucoperichondrial flap was elevated. The bony-cartilaginous junction was disarticulated and offending portions of cartilage and bone were resected taking great care to spare dorsal and caudal struts. Excellent improvement in the airway was noted bilaterally. Following the endoscopic sinus surgery portions of the procedures (so as to not disrupt the suture closure during the sinus surgery), the septum was quilt sutured using 4-0 plain gut suture and the anterior incision was then closed with 4-0 chromic gut.    Starting the endoscopic sinus surgery on the left side, the left middle turbinate was carefully medialized. The left middle turbinate was carefully medialized. The uncinate process taken down, the natural ostium maxillary sinus identified, and enlarged inferiorly-posteriorly to create the antrostomy. The maxillary sinus was opened completely to maximum dimensions given planned orbital decompression: posteriorly to the descending canal with removal of the posterior fontanelle, anteriorly to the lacrimal sac and duct, superiorly to the orbital floor, and inferiorly to the upper border  of the inferior turbinate. The ethmoid bulla was taken down to the lamina papyracea and to the basal lamella of the middle turbinate. The basal lamella was penetrated to the appropriate level to reveal superior turbinate, sphenoethmoid recess, and posterior ethmoids. Posterior ethmoids were entered. Posterior ethmoid skull base was identified. The inferior 2 mm of superior turbinate was taken down. The sphenoid ostium was cannulated and sphenoidotomy performed with a punch. The anterior wall/face of the sphenoid sinus was completely resected given need for exposure of the orbital apex and planned orbital decompression. Total ethmoidectomy was completed working from posterior to anterior, taking down bony lamellae of the ethmoid sinuses, skeletonizing skull base and lamina papyracea in a mucosal-sparing fashion to the frontal recess. In the frontal recess the agger nasi cell was taken down. The frontal sinus ostium was cannulated and extended anteriorly creating the frontal sinusotomy (Draf IIA).     Attention was turned to the right-sided sinus surgery. The right middle turbinate was enlarged and demonstrated presence of a valerie bullosa. The right valerie bullosa was carefully resected by removing the lateral portion using through-cutting instruments, and taking care to preserve the mucosa of the medial remnant. The structure of the middle turbinate and its stable attachments to the skull base and lateral nasal wall were maintained. The uncinate process was then taken down. The natural ostium of the maxillary sinus was identified, and enlarged inferiorly-posteriorly to create the antrostomy. The maxillary sinus was opened completely to maximum dimensions given planned orbital decompression: posteriorly to the descending canal with removal of the posterior fontanelle, anteriorly to the lacrimal sac and duct, superiorly to the orbital floor, and inferiorly to the upper border of the inferior turbinate. The ethmoid  bulla was taken down to the lamina papyracea, and to the basal lamella of the middle turbinate. The basal lamella was penetrated to the appropriate level to reveal superior turbinate, sphenoethmoid recess, and posterior ethmoids. Posterior ethmoids were entered. Posterior ethmoid skull base was identified. The inferior 2 mm of superior turbinate was taken down. The sphenoid ostium was cannulated, and sphenoidotomy performed with a punch. he anterior wall/face of the sphenoid sinus was completely resected given need for exposure of the orbital apex and planned orbital decompression. Total ethmoidectomy was completed working from posterior to anterior, taking down bony lamellae of the ethmoid sinuses, skeletonizing skull base and lamina papyracea in a mucosal-sparing fashion to the frontal recess. In the frontal recess the agger nasi cell was taken down. The frontal sinus ostium was cannulated. It was extended anteriorly, taking down portions of the frontal beak, creating a frontal sinusotomy (Draf IIA). Bipolar cautery was used to obtain hemostasis bilaterally as well as focal placement of hemostatic materials and pledgets as needed. Sinonasal cavities were copiously irrigated with normal saline bilaterally to clear retained debris and blood, and clear the surgical field for subsequent procedures involving the medial orbits.    Orbital decompression was then performed endoscopically bilaterally. Starting with the left side, mucosa was stripped off the lamina papyracea and the bone of the lamina was then opened just anterior to the sphenoid face using a Bell elevator to carefully fracture and elevate this thin bone from the underlying periorbita. Fragments of lamina bone were removed using a Blakesley forceps. The lamina papyracea was removed from posterior-to-anterior, from the orbital apex to just posterior to the lacrimal sac and duct, exposing the periorbita from the orbital floor to the ethmoid roof superiorly.  The medial orbital floor was then partially removed with a Kerrison rongeur. The periorbita was then opened using a sickle knife to make horizontal posterior-to-anterior incisions at approximately the levels of the ethmoid roof superiorly, the medial orbit, and inferiorly near the level of the orbital floor. Periorbital fat was then allowed to herniate through the incised periorbita along the entire extent of the opened periorbital and medial orbit. Care was taken to ensure the herniated fat did not obstruct the sinus ostia. An identical set of procedures were then performed on the right side: the lamina papyracea was opened just anterior to the sphenoid face using a Casey elevator to elevate bone from the periorbita. Fragments of lamina bone were removed using a Blakesley forceps. Removal of the lamina was performed posterior-to-anterior exposing the periorbita from the orbital floor to the ethmoid roof superiorly. The medial orbital floor was partially removed with a Kerrison rongeur. The periorbita was opened using a sickle knife to make horizontal posterior-to-anterior incisions similar to the contralateral side. Periorbital fat was then herniated through the incised periorbita along its entire medial extent. Care was again taken to ensure herniated fat did not obstruct the sinus ostia. A steroid eluting bioabsorbable stent was placed extending from the frontal recess into the ethmoid cavity bilaterally, to maintain patency of the frontal recess, improve post-operative healing, and further prevent obstruction of the frontal recess by herniated orbital fat. The eyes were examined repeatedly and image guidance used to confirm locations throughout the bilateral orbital decompression, confirming no apparent injuries or acute changes.     Next, the inferior turbinates were addressed. An incision was made in the head of the inferior turbinates bilaterally after infiltrating local anesthetic with 1:100,000 epinephrine  in the submucosal plane. A caudal elevator was used to elevate the submucosa from underlying valerie bone bilaterally and inferior turbinate microdebrider blade was used to perform submucous resection of the inferior turbinates and portions of the underlying valerie bone bilaterally. The turbinate was then therapeutically outfractured to lateralize the position of the inferior turbinate bilaterally.      All debris was removed from the sinonasal cavities. Additional slow diffuse oozing of bleeding throughout the sinonasal cavities was addressed with warm saline irrigations, placement of vasoconstrictive agent on pledgets (topical epinephrine), followed by strategic placement of hemostatic agents including thrombin and hemostatic matrix (Floseal), pledgets of Gelfoam wrapped in Surgicel, as well as focal applications of Surgicel fibrillar. Chitin dressings were then placed in the ethmoid cavities bilaterally. Care was taken to gently place hemostatic agents and absorbable packing, so as to not compress the herniated orbital fat back into the orbits with packing material bilaterally. Excellent hemostasis was obtained. Bilateral septal splints were placed (Angeles splints), lubricated with antibiotic ointment, and secured with an anterior prolene suture tied on the left side. The patient was returned to the care of Anesthesia for awakening.    Disposition and Postoperative Care:  Plan is for discharge to home as the procedures were tolerated well with no complications or adverse events. Postoperative care instructions have been provided as well as prescriptions for postoperative medications.     Postoperative endoscopic debridement is planned at one week and two weeks following date of surgery, with a possible third debridement depending on post-operative recovery and findings. This is necessary to remove crusting/necrotic tissue, blood clots and retained secretions that may lead to adverse scarring, infection, and  prolonged healing. Failure to perform adequate postop debridement following endoscopic sinus surgery is known to result in suboptimal healing, scarring and poor surgical outcomes. Determination of additional or continued debridements will be determined during the second postoperative debridement or earlier in situation of a complication arising before that appointment.        Donte Mcdonough MD    Rhinology, Allergy, and Sinus-Skull Base Surgery    Department of Otorhinolaryngology    [facility]    Phone  [phone]    Fax      [phone]

## 2020-02-19 NOTE — TELEPHONE ENCOUNTER
----- Message from Arleen Lovelcae sent at 2/19/2020 12:44 PM CST -----  Contact: - Shay  Type: Patient Call Back    Who called: - Shay    What is the request in detail:  is requesting a call back in regards to question about the pt fluticasone propionate (FLONASE) 50 mcg/actuation nasal spray.     Can the clinic reply by [facility]?    Would the patient rather a call back or a response via [facility]? Call back     Best call back number: [phone]

## 2020-02-19 NOTE — OR NURSING
Dr. Mcdonough informed pt c/o eye pain, and throat pain, and not sure if she wants to go home.  No orders to admit pt, states she can start her RX meds tomorrow, and bring pain pill RX to 24hr pharmacy.  OK to give pt ibuprofen now and d/c home when pt ready.

## 2020-02-19 NOTE — BRIEF OP NOTE
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 Endoscopic orbital decompression performed bilaterally from sphenoid face posteriorly to the lacrimal duct anteriorly, with incisions made in the periorbita to effectively allow controlled prolapse of orbital fat into the ethmoid and nasal cavity bilaterally with care to maintain patency of sinus outflow. Septal deviation and inferior turbinate hypertrophy addressed. Diffuse oozing throughout procedures addressed with bipolar cautery and placement of absorbable hemostatic agents and packing. Septal splints bilaterally.     Estimated Blood Loss: <200 cc         Specimens (From admission, onward)     Start     Ordered    02/18/20 1614  Specimen to Pathology, Surgery ENT  Once     Question Answer Comment   Procedure Type: ENT    Specimen Class: Routine/Screening        02/18/20 1614                Disposition:   The patient did well with surgery and there were no complications. she was extubated and taken to PACU in anticipation of discharge to home for this planned outpatient same day procedure. Post-operative orders were placed and prescriptions provided for post-operative medications.    Additional Information and Post-op Instructions:   For additional information and answers to common  questions regarding the procedures performed, please see the patient instructions I have included in the discharge materials for today's surgery.    Thank You  MD Donte Kessler MD    Rhinology, Allergy, and Sinus-Skull Base Surgery    Department of Otorhinolaryngology    [facility]    Phone  [phone]    Fax      [phone]

## 2020-02-19 NOTE — DISCHARGE INSTRUCTIONS
INSTRUCTIONS TO FOLLOW AFTER SINUS AND NASAL SURGERY  DR. THOMAS - [facility]    Pain pill Oxycodone was given at 7:17pm    Office hours:  Weekdays 8:00 am to 5:00 pm.  Please call [phone] and ask to speak with his medical assistant, Ju.    After-hours & weekends:  Please call [phone] and ask to speak with the ENT resident doctor.    Your first office visit with Dr. Mcdonough after surgery should have been already scheduled.  If you don't know when it is, call our office at [phone] and Ju can help you schedule   the appointment or notify you of the scheduled time. Normally there will be a follow-up visit each week for two to three weeks after surgery.     Please call IMMMEDIATELY if you have:   Temperature of 101° F or greater   Any unusual, painful swelling   Any active bleeding that saturates more than a 4x4 gauze   Any thick drainage green or yellow drainage   Changes in vision or swelling around the eye   Pain not relieved by your prescribed pain medication    ACTIVITY:  Sleep on your back with the head of the bead elevated, up on 2-3 pillows, or in a recliner for the first 3 to 5 days. This will help with swelling.     After surgery you may have a lot of nasal drainage. This is normal. You may breathe through your nose after surgery, though may notice you feel congested because of swelling and absorbable sponges placed in the nose. Nasal breathing usually improves significantly after your first visit after surgery when this material is removed.     You may wake up after surgery with thick white stockings on. Wear them until you are walking around more. It is important to walk around often while at home to keep your blood circulating and prevent blood clots.    If you use CPAP or BiPAP to sleep at night, you should wait at least 48 hours before resuming use.  Dr. Mcdonough will advise you when it is safe to do this.  You may shower after surgery.    RESTRICTED ACTIVITIES AFTER  SURGERY:   Do NOT blow your nose for 2 weeks. If you have to sneeze or cough, do so with your mouth open. Allow drainage from nose to fall on a mustache dressing (gauze placed under nose) and change it as needed, or gently wipe outside of nose without blowing.   AVOID all heavy lifting, straining or bending for 2 weeks.    AVOID any sexual activity for 2 weeks after surgery.   AVOID semi-contact sports or vigorous exercising for 3-4 weeks. Dr. Mcdonough will let you know when you are cleared to resume exercise.   AVOID flying or swimming for 2 weeks.     Do NOT operate a motor vehicle or any type of heavy machinery within 24 hours of taking pain medication.   DO NOT smoke or be around smokers.   AVOID irritating substances that might make you sneeze, such as dust, chalk, harsh chemicals, and allergic triggers.  This might also include spicy foods.    DRESSINGS:  Change the gauze mustache dressing under your nose as needed. (If unsure what this dressing is or how to do this, ask your doctor or nurse before you leave the hospital.) You may have pinkish-red drainage for 2-3 days, small amounts of bleeding may occur until the nose heals; you should notify us if there is significant bleeding and if there is ongoing bleeding which is not quickly stopping.    A dissolvable sponge will often be placed into the sinuses during surgery. These absorbable dressings are easily and gently removed at your first visit after surgery (often about one week after). These must be kept moist with nasal saline irrigations to allow them to help the nose heal and prevent them from becoming hard and less easily removed - another reason for the importance of your nasal irrigations. Absorbable stents may also be placed. You may notice small fragments of these items come out of your nose in the weeks following surgery (the stents may look like small fish bones if pieces come out); this is not problematic if parts of these materials come  out. Usually there is no gauze packing placed inside the nose -- If packing is necessary, you will be informed of this and instructions given.    MEDICATIONS:  After surgery, you will be sent home with prescriptions for:    Pain medication - take as needed for more severe pain, but Tylenol and ibuprofen is usually sufficient to control most of the pain related to surgery    Anti-nausea medication - for use as needed after surgery    Antibiotics - take all of this medication as instructed until completed.     Steroid - take according to specific instructions until completed:  Prednisone, 10mg tablets: 3 tablets per day for the first 4 days, 2 tablets per day for the next 4 days, then 1 tablet per day for the next 4 days -- a total of 24 tablets and 12 days to complete the taper of this medication.   Steroids can cause difficulty sleeping, so I recommend you take this medication in the morning. If you still have trouble sleeping, taking melatonin (3-5mg) at night may help.   For individuals with diabetes: Steroids increase blood sugars. It is critical to monitor blood glucose carefully and adjust insulin as needed to control blood sugars while taking this medication.     Most people need pain medication for the first few days after surgery, although a narcotic is rarely necessary.  The best pain control comes from a combination of ACETAMINOPHEN (Tylenol) and IBUPROFEN (Motrin).  You will be given prescriptions for these so you have the recommended dose and usage instructions, but can use any over-the-counter versions of acetaminophen and ibuprofen.  These can be alternated so that you are taking something about every 3 hours while awake.     Some people have problems with bowel movements after surgery. If you have NOT had a bowel movement 3-5 days after surgery, go to your local pharmacy and purchase an over the counter stool softener such as COLACE. You can also ask the pharmacist for his or her recommendation. If  you still do not have a bowel movement after starting the softener, please call the office. Antibiotics can also upset your stomach and cause nausea, diarrhea, and/or constipation by reducing the good bacteria in your gut. Replenish the good bacteria by consuming PROBIOTICS either as supplement pills, eating probiotic yogurt, or drinking kefir yogurt drink. Look for terms like live and active cultures or live probiotics on the product - a common brand of probiotic yogurt is Olivia's.    You may be given an ointment called MUPIROCIN to use after surgery.  If you are given this, use a cotton swab to apply gently inside the nostrils.  Do this 2 times a day for 1 week.    You will need these over-the-counter medications after surgery:  SALINE SINUS RINSE (Med Med brand): You will use this saline sinus irrigation to rinse out your nose and sinuses after surgery.  Begin doing this the day after surgery, unless instructed otherwise by Dr. Mcdonough.  You should do a full bottle, half on one side of your nose and half on the other, 2 times per day (or more if able to, you cannot do this too much). These are very important to allow the nose to heal well, remove debris, and reduce risk of unwanted scar tissue from forming. Follow the instructions on the box: mix the salt packet with clean water (bottle, previously boiled, distilled, etc -- not tap water) to the line on the bottle to make the irrigation.    AFRIN (regular strength): Only use if you have nasal congestion or bleeding. Use 2 times per day for 3 days, stop for 1 day, continue 2 times per day for 3 days, then stop completely. This is very successful at resolving minor nose bleeds following surgery. The generic drug name for Afrin is oxymetazoline, and it is sold as a nasal decongestant.  NOTE:  You may not need to do this at all.    DIET:   Avoid hot and spicy foods for 1 week after surgery.   Begin with bland foods the evening after surgery and advance to your  regular diet as tolerated.  It is not necessary to take only soft food unless you are recovering from tonsil surgery.   Drink plenty of fluids (water is best).    Avoid alcoholic and caffeinated beverages for 1 week after surgery because they can cause you to become dehydrated.      Following these instructions, remembering to do the sinus irrigations, and returning for your follow-up visits after surgery will make sure you have the best possible result. Wishing you a quick and easy recovery from surgery, and looking forward to your improvements.   Donte Mcdonough MD  Rhinology, Sinus, and Skull Base Surgery  Department of Otorhinolaryngology          Fall Prevention  Millions of people fall every year and injure themselves. You may have had anesthesia or sedation which may increase your risk of falling. You may have health issues that put you at an increased risk of falling.     Here are ways to reduce your risk of falling.  ·   · Make your home safe by keeping walkways clear of objects you may trip over.  · Use non-slip pads under rugs. Do not use area rugs or small throw rugs.  · Use non-slip mats in bathtubs and showers.  · Install handrails and lights on staircases.  · Do not walk in poorly lit areas.  · Do not stand on chairs or wobbly ladders.  · Use caution when reaching overhead or looking upward. This position can cause a loss of balance.  · Be sure your shoes fit properly, have non-slip bottoms and are in good condition.   · Wear shoes both inside and out. Avoid going barefoot or wearing slippers.  · Be cautious when going up and down stairs, curbs, and when walking on uneven sidewalks.  · If your balance is poor, consider using a cane or walker.  · If your fall was related to alcohol use, stop or limit alcohol intake.   · If your fall was related to use of sleeping medicines, talk to your doctor about this. You may need to reduce your dosage at bedtime if you awaken during the night to go to the  bathroom.    · To reduce the need for nighttime bathroom trips:  ¨ Avoid drinking fluids for several hours before going to bed  ¨ Empty your bladder before going to bed  ¨ Men can keep a urinal at the bedside  · Stay as active as you can. Balance, flexibility, strength, and endurance all come from exercise. They all play a role in preventing falls. Ask your healthcare provider which types of activity are right for you.  · Get your vision checked on a regular basis.  · If you have pets, know where they are before you stand up or walk so you don't trip over them.  Use night lights.

## 2020-02-19 NOTE — TELEPHONE ENCOUNTER
Patient's  called me back and was confused on MsTrae Rasmussen post op instructions and I clarified all that he was confused with.          Called Patient's  Shay and left voicemail to call me back regarding questions he has about Flonase.

## 2020-02-19 NOTE — ANESTHESIA POSTPROCEDURE EVALUATION
Anesthesia Post Evaluation    Patient: Alina Rendon    Procedure(s) Performed: Procedure(s) (LRB):  SEPTOPLASTY, NOSE, ENDOSCOPIC (N/A)  EXCISION, NASAL TURBINATE, SUBMUCOSAL (Bilateral)  DECOMPRESSION, ORBIT (Bilateral)  FESS, USING COMPUTER-ASSISTED NAVIGATION (Bilateral)    Final Anesthesia Type: general    Patient location during evaluation: PACU  Patient participation: Yes- Able to Participate  Level of consciousness: awake and alert  Post-procedure vital signs: reviewed and stable  Pain management: adequate  Airway patency: patent    PONV status at discharge: No PONV  Anesthetic complications: no      Cardiovascular status: hemodynamically stable  Respiratory status: unassisted and spontaneous ventilation  Hydration status: euvolemic  Follow-up not needed.          Vitals Value Taken Time   /55 2/18/2020  6:40 PM   Temp 36.7 °C (98 °F) 2/18/2020  6:40 PM   Pulse 83 2/18/2020  6:40 PM   Resp 18 2/18/2020  6:40 PM   SpO2 96 % 2/18/2020  6:40 PM         Event Time     Out of Recovery 18:37:45          Pain/Addison Score: Pain Rating Prior to Med Admin: 7 (2/18/2020  7:16 PM)  Addison Score: 9 (2/18/2020  6:40 PM)

## 2020-02-19 NOTE — PLAN OF CARE
S/p septoplasty, FESS, bilateral decompression, excision of turbinates; patient HDS with VSS; denies pain; tolerating ice chips with no n/v.     Patient follows commands and responds appropriately to voice and questions; Dr. Mcdonough reported to bedside for patient evaluation, as well as has given update to family.    Report given to CHRIS Shaikh in [facility]; patient's dc dispo is pending voiding, continued managed pain and HDS. Patient to dc home. Rx placed on chart per Dr. Mcdonough.

## 2020-02-19 NOTE — DISCHARGE SUMMARY
Outpatient surgery discharge summary:    Admit Date (Surgery Date): 2/18/2020    Discharge Date: 2/18/2020    Hospital Course: Surgery performed as planned without complications. Procedures tolerated well and sent to recovery in stable condition with plan to discharge to home.     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 If you have to sneeze or cough, do so with your mouth open. Allow drainage from nose to fall on a mustache dressing (gauze placed under nose) and change it as needed, or gently wipe outside of nose without blowing.  AVOID all heavy lifting, straining or bending for 2 weeks.   AVOID any sexual activity for 2 weeks after surgery.  AVOID semi-contact sports or vigorous exercising for 3-4 weeks. Dr. Mcdonough will let you know when you are cleared to resume exercise.  AVOID flying or swimming for 2 weeks.    Do NOT operate a motor vehicle or any type of heavy machinery within 24 hours of taking pain medication.  DO NOT smoke or be around smokers.  AVOID irritating substances that might make you sneeze, such as dust, chalk, harsh chemicals, and allergic triggers.  This might also include spicy foods.     Wound care routine (specify)   Order Comments: Wound care routine: See discharge instructions for sinus and nasal surgery     Follow-up Information     Donte Mcdonough MD.    Specialty:  Otolaryngology  Contact information:  [address] [facility] [address]  [address]  [facility] [id number] [phone]

## 2020-02-24 LAB
FINAL PATHOLOGIC DIAGNOSIS: NORMAL
GROSS: NORMAL

## 2020-02-27 ENCOUNTER — OFFICE VISIT (OUTPATIENT)
Dept: OTOLARYNGOLOGY | Facility: CLINIC | Age: 55
End: 2020-02-27
Payer: COMMERCIAL

## 2020-02-27 VITALS
HEIGHT: 66 IN | SYSTOLIC BLOOD PRESSURE: 110 MMHG | DIASTOLIC BLOOD PRESSURE: 60 MMHG | WEIGHT: 156 LBS | BODY MASS INDEX: 25.07 KG/M2

## 2020-02-27 DIAGNOSIS — H47.20 OPTIC ATROPHY, BOTH EYES: ICD-10-CM

## 2020-02-27 DIAGNOSIS — J34.89 CONCHA BULLOSA: ICD-10-CM

## 2020-02-27 DIAGNOSIS — J34.89 NASAL CRUSTING: ICD-10-CM

## 2020-02-27 DIAGNOSIS — E05.00 GRAVES' OPHTHALMOPATHY: ICD-10-CM

## 2020-02-27 DIAGNOSIS — J34.89 NASAL OBSTRUCTION: ICD-10-CM

## 2020-02-27 DIAGNOSIS — Z98.890 STATUS POST FUNCTIONAL ENDOSCOPIC SINUS SURGERY (FESS): Primary | ICD-10-CM

## 2020-02-27 PROCEDURE — 3008F BODY MASS INDEX DOCD: CPT | Mod: CPTII,S$GLB,, | Performed by: OTOLARYNGOLOGY

## 2020-02-27 PROCEDURE — 31237 PR NASAL/SINUS ENDOSCOPY,BX/RMV POLYP/DEBRID: ICD-10-PCS | Mod: 50,79,S$GLB, | Performed by: OTOLARYNGOLOGY

## 2020-02-27 PROCEDURE — 31237 NSL/SINS NDSC SURG BX POLYPC: CPT | Mod: 50,79,S$GLB, | Performed by: OTOLARYNGOLOGY

## 2020-02-27 PROCEDURE — 99213 PR OFFICE/OUTPT VISIT, EST, LEVL III, 20-29 MIN: ICD-10-PCS | Mod: 25,24,S$GLB, | Performed by: OTOLARYNGOLOGY

## 2020-02-27 PROCEDURE — 99213 OFFICE O/P EST LOW 20 MIN: CPT | Mod: 25,24,S$GLB, | Performed by: OTOLARYNGOLOGY

## 2020-02-27 PROCEDURE — 3008F PR BODY MASS INDEX (BMI) DOCUMENTED: ICD-10-PCS | Mod: CPTII,S$GLB,, | Performed by: OTOLARYNGOLOGY

## 2020-02-27 NOTE — PROGRESS NOTES
"  Subjective:      Alina Rendon is a 54 y.o. female who comes for follow-up 1 week status-post endoscopic sinus surgery and bilateral orbital decompressions for history of Graves' ophthalmopathy with vision loss and optic nerve atrophy. Also underwent septoplasty, valerie bullosa resection, and turbinate reduction surgery  Her last clinic visit with me was on 2/3/2020 and surgery was on 2/18/2020.  She has been doing well overall without significant issues aside from pressure and pain following surgery. She denies abnormal discharge or unusual bleeding. Post-operative pain located at her nose and paranasal sinuses has been present since time of surgery, which was initially reported as being severe immediately after surgery but has responded to analgesic use and improved significantly after the first few days post-op. She has had no change in vision including double vision or change in visual acuity. Reports baseline issues with eyes and vision which were present pre-operatively. Mild bruising at small areas on eye lids evolved post-op without further progression.  She has taken post-operative medications as prescribed, adhered to post-operative instructions including activity restrictions, and has performed recommended post-operative cares including nasal saline irrigations. Recovery has overall followed an expected course for the procedures performed and no other new or unusual associated symptoms are present.     Global QOL assessment ("overall, how do you feel today?" from 1 "very bad" to 10 "very good"): 5    Surgical procedures performed on 2/18/2020:  1. Bilateral endoscopic maxillary antrostomy  2. Bilateral endoscopic total ethmoidectomy  3. Bilateral endoscopic sphenoidotomy  4. Bilateral endoscopic frontal sinusotomy.  5. Bilateral orbital decompression with resection of medial orbital walls and periorbita  6. Right valerie bullosa resection  7. Bilateral partial resection of the inferior turbinates with " therapeutic outfracture  8. Septoplasty  9. CT stereotactic navigational surgery    Findings at time of surgery:  Ms. TAMMY Rendon had evidence of bilateral Graves' orbitopathy with increased volume and pressure of orbital contents. Endoscopic orbital decompression performed bilaterally with removal of the lamina papyracea posterior-to-anterior from the orbital apex just anterior to the sphenoid face, to just posterior to the lacrimal sac and duct. Incisions were made horizontally in the periorbita posterior-to-anterior to effectively allow controlled prolapse of orbital fat into the ethmoid and nasal cavity bilaterally. Care was taken to maintain patency of sinus outflow by avoiding obstruction with herniated orbital fat. Sources of nasal obstruction including septal deviation, valerie bullosa, and inferior turbinate hypertrophy were also addressed with excellent results in improving the nasal airway bilaterally. Diffuse oozing throughout these procedures was addressed with bipolar cautery and placement of absorbable hemostatic agents and packing. Excellent hemostasis was achieved with these interventions. Septal splints were placed bilaterally.     Past Medical History  She has a past medical history of CIDP (chronic inflammatory demyelinating polyneuropathy), Grave's disease, Hypothyroidism, Neuropathy, Screening for colorectal cancer, Spondylolisthesis, and Vitamin D deficiency.    Past Surgical History  She has a past surgical history that includes Temporomandibular joint surgery; fractional D&C (10/2006); Dilation and curettage of uterus; Endometrial ablation (10/27/2006); Cataract extraction (Left); Eye surgery (Left); Endoscopic nasal septoplasty (N/A, 2/18/2020); Decompression of orbit (Bilateral, 2/18/2020); and Functional endoscopic sinus surgery (FESS) using computer-assisted navigation (Bilateral, 2/18/2020).    Allergies  She has No Known Allergies.    Medications   She has a current medication list which  "includes the following prescription(s): amoxicillin-clavulanate 875-125mg, duloxetine, fluticasone propionate, gabapentin, gralise, hydroxyzine hcl, ibuprofen, immune globulin,gamma(igg), naproxen sodium, oxycodone, prednisone, synthroid, tramadol, and vitamin d.    Review of Systems  Previous ROS reviewed and updated as per HPI with pertinent positives and negatives including:  Eyes: No change in vision, including no new double vision  Nose: Post-operative changes as per HPI, no other new symptoms present  Hematologic: No unusual bleeding  Respiratory: No respiratory distress, nasal breathing changes as per HPI  Neuro: Post-operative pain as per HPI, no other acute neurologic changes    New complete ROS performed today:  Review of Systems   Constitutional: Negative.    HENT: Negative for hoarse voice.    Eyes: Positive for photophobia, discharge and visual disturbance.   Respiratory: Negative.    Cardiovascular: Negative.    Gastrointestinal: Negative.    Endocrine: Negative.    Genitourinary: Negative.    Musculoskeletal: Negative.    Skin: Negative.    Allergic/Immunologic: Negative.    Neurological: Negative.    Hematological: Negative.    Psychiatric/Behavioral: Negative.        Objective:     /60 (BP Location: Right arm, Patient Position: Sitting, BP Method: Small (Manual))   Ht 5' 6" (1.676 m)   Wt 70.8 kg (156 lb)   BMI 25.18 kg/m²        Constitutional:   Vital signs are normal. She appears well-developed and well-nourished. She does not appear ill. No distress. Normal speech.  No hoarse voice and breathy voice.      Head:  Normocephalic and atraumatic. Head is without right periorbital erythema and without left periorbital erythema. No scars. Facial strength is normal.          Ears:    Right Ear: No drainage, swelling or tenderness. No decreased hearing is noted.   Left Ear: No drainage, swelling or tenderness. No decreased hearing is noted.     Nose:  Mucosal edema ( mucosal edema consistent " with recent sinonasal surgery), rhinorrhea (There are secretions and debris present bilaterally consistent with recent sinonasal surgery) and sinus tenderness ( sinonasal tenderness appropriate for recent sinonasal surgery) present. No septal deviation (Well healing incision from septoplasty, septum straight) or nasal septal hematoma. No epistaxis ( there is no active epistaxis but old blood and clot present bilaterally as expected for recent surgical procedures). Foreign body (septal splints present which removed) is present. Right sinus exhibits maxillary sinus tenderness. Right sinus exhibits no frontal sinus tenderness. Left sinus exhibits maxillary sinus tenderness. Left sinus exhibits no frontal sinus tenderness.     Mouth/Throat  Oropharynx clear and moist without lesions or asymmetry. Normal dentition. No oral lesions or mucous membrane lesions. No oropharyngeal exudate, posterior oropharyngeal edema or posterior oropharyngeal erythema.     Neck:  Phonation normal. No edema, no erythema, no stridor and no neck rigidity present.     Pulmonary/Chest:   Effort normal. No stridor. No respiratory distress.     Psychiatric:   She has a normal mood and affect. Her speech is normal.     Neurological:   She has neurological normal, alert and oriented. No cranial nerve deficit or sensory deficit.     Skin:   No abrasions, lacerations, lesions, or rashes.       Procedure    Endoscopic debridement performed.  See procedure note for details. Crusting, post-operative debris, and retained secretions were removed as planned and indicated for recent endoscopic sinonasal surgery. Nasal septal splints removed. Extensive packing present requiring debridement given additional hemostatic packing placed at time of surgery indicated for control of significant oozing and which provided successful post-operative hemostasis. Steroid eluting stents were left in place, as was a layer of absorbable packing over mucosal surfaces for  further gentle softening and partial debridement with irrigations prior to next scheduled follow-up and debridement.                                 Data Reviewed    Part 1   Fragments of respiratory mucosa and cancellous bone (submitted as left sinus   contents):   -Mild chronic inflammation with fibrosis but no eosinophils present within   respiratory mucosa   -No significant histopathologic abnormalities of bone     Part 2   Fragments of respiratory mucosa and cancellous bone (submitted as right sinus   contents):   -Chronic inflammation and fibrosis without eosinophils present within   respiratory mucosa   -No significant histopathologic abnormalities of bone     Operative report reviewed and procedures performed as well as key operative findings are described in history above.      Assessment:     Doing well following bilateral endoscopic sinus surgery and bilateral orbital decompression, along with valerie bullosa resection. There is decompression of the orbits bilaterally with non-obstructive prolapse of orbital fat from resected medial orbital walls into the ethmoid and nasal cavity.  She also underwent septum/turbinate surgery which is unrelated to the reason for today's visit.    1. Status post functional endoscopic sinus surgery (FESS)    2. Nasal crusting    3. Nasal obstruction    4. Graves' ophthalmopathy    5. Optic atrophy, both eyes    6. Valerie bullosa         Plan:     We discussed post-operative cares following surgery and ongoing medical management. Importance of scheduled debridements to remove crusting, debris, and potential scar tissue was reviewed again today, as this is associated with optimal healing and overall improved surgical outcome. Importance of medical therapies following surgery were also reviewed including saline irrigations, to clear debris and prevent obstruction, and particularly to help gently soften and remove residual absorbable hemostatic packing material. All questions  were answered. She will continue with medical management consisting of nasal saline irrigations and topical nasal steroid spray. We will continue to follow her response to surgery and this ongoing medical therapy. Follow-up is scheduled in 1 week. I encouraged her to call with any questions or concerns in the meantime.      Donte Mcdonough MD    Rhinology, Allergy, and Sinus-Skull Base Surgery    Department of Otorhinolaryngology    [facility]    Phone  [phone]    Fax      [phone]

## 2020-03-01 NOTE — PROCEDURES
"Nasal/sinus endoscopy  Date/Time: 2/27/2020 4:00 PM    Time out: Immediately prior to procedure a "time out" was called to verify the correct patient, procedure, equipment, support staff and site/side marked as required.  Performed by: Donte Mcdonough MD  Authorized by: Donte Mcdonough MD     Consent Done?:  Yes (Verbal)  Anesthesia:     Local anesthetic:  4% Xylocaine spray with Quincy-Synephrine    Location: Bilateral.    Patient tolerance:  Patient tolerated the procedure well with no immediate complications  Nose:     Nasal Endoscopy Performed: Endoscopic sinonasal debridement.  External:      No external nasal deformity  Intranasal:      Mucosa no polyps     Mucosa ulcers not present     Mucosa lesions present (crusting and debris, as well as mucosal edema, consistent with recent surgery)     Turbinates not enlarged     No septum gross deformity (well healing from septoplasty)  Nasopharynx:      No mucosa lesions     Adenoids not present     Posterior choanae patent     Eustachian tube patent     Procedure Description: The rigid nasal endoscope was used to assist with debridement of the sinonasal cavities as part of necessary postoperative care. Failure to perform adequate postoperative debridement following endoscopic sinus surgery is known to result in poor healing, scarring and worse surgical outcomes, and thus indicated to promote optimal results from endoscopic sinus surgery. Informed consent was obtained prior to proceeding.     The nasal cavity was decongested and anesthetized.  A rigid nasal endoscope was introduced into the nasal cavity and used to evaluate the sinonasal cavities, mucosa, sinus ostia and turbinates bilaterally. Crusting/necrotic tissue was removed with forceps from the osteomeatal complex bilaterally. Additional debris/necrotic material, blood clots and retained secretions were removed from the nasal cavities and paranasal sinuses bilaterally. The patient tolerated the procedure well " and there were no complications/adverse events.     Summary of Findings/Assessment:  Nasal septal splints removed. Extensive packing present requiring debridement given additional hemostatic packing placed at time of surgery indicated for control of significant oozing and which provided successful post-operative hemostasis. Steroid eluting stents were left in place, as was a layer of absorbable packing over mucosal surfaces for further gentle softening and partial debridement with irrigations prior to next scheduled follow-up and debridement. Orbital fat prolapsing into ethmoid cavities appropriately and as intended through defects of the medial orbital walls created for bilateral orbital decompression. Orbital fat is non-obstructive of sinus ostia/outflow.  Overall successful debridement and evaluation with endoscopy. Recovering well following endoscopic sinus surgery and orbital decompressions with expected post-operative findings.

## 2020-03-05 ENCOUNTER — OFFICE VISIT (OUTPATIENT)
Dept: OTOLARYNGOLOGY | Facility: CLINIC | Age: 55
End: 2020-03-05
Payer: COMMERCIAL

## 2020-03-05 VITALS
BODY MASS INDEX: 25.07 KG/M2 | HEIGHT: 66 IN | DIASTOLIC BLOOD PRESSURE: 60 MMHG | WEIGHT: 156 LBS | SYSTOLIC BLOOD PRESSURE: 100 MMHG

## 2020-03-05 DIAGNOSIS — E05.00 GRAVES' OPHTHALMOPATHY: ICD-10-CM

## 2020-03-05 DIAGNOSIS — J34.89 NASAL OBSTRUCTION: ICD-10-CM

## 2020-03-05 DIAGNOSIS — H47.20 OPTIC ATROPHY, BOTH EYES: ICD-10-CM

## 2020-03-05 DIAGNOSIS — J34.89 CONCHA BULLOSA: ICD-10-CM

## 2020-03-05 DIAGNOSIS — J34.89 NASAL CRUSTING: ICD-10-CM

## 2020-03-05 DIAGNOSIS — Z98.890 STATUS POST FUNCTIONAL ENDOSCOPIC SINUS SURGERY (FESS): Primary | ICD-10-CM

## 2020-03-05 PROCEDURE — 99213 PR OFFICE/OUTPT VISIT, EST, LEVL III, 20-29 MIN: ICD-10-PCS | Mod: 25,24,S$GLB, | Performed by: OTOLARYNGOLOGY

## 2020-03-05 PROCEDURE — 99213 OFFICE O/P EST LOW 20 MIN: CPT | Mod: 25,24,S$GLB, | Performed by: OTOLARYNGOLOGY

## 2020-03-05 PROCEDURE — 3008F BODY MASS INDEX DOCD: CPT | Mod: CPTII,S$GLB,, | Performed by: OTOLARYNGOLOGY

## 2020-03-05 PROCEDURE — 3008F PR BODY MASS INDEX (BMI) DOCUMENTED: ICD-10-PCS | Mod: CPTII,S$GLB,, | Performed by: OTOLARYNGOLOGY

## 2020-03-05 PROCEDURE — 31237 PR NASAL/SINUS ENDOSCOPY,BX/RMV POLYP/DEBRID: ICD-10-PCS | Mod: 50,79,S$GLB, | Performed by: OTOLARYNGOLOGY

## 2020-03-05 PROCEDURE — 31237 NSL/SINS NDSC SURG BX POLYPC: CPT | Mod: 50,79,S$GLB, | Performed by: OTOLARYNGOLOGY

## 2020-03-05 NOTE — PROGRESS NOTES
"  Subjective:      Alina Rendon is a 54 y.o. female who comes for follow-up 2 weeks status-post endoscopic sinus surgery and bilateral orbital decompressions for history of Graves' ophthalmopathy with vision loss and optic nerve atrophy. Also underwent septoplasty, valerie bullosa resection, and turbinate reduction surgery. Her last clinic visit with me was on 2/27/2020.  She has been doing well overall without significant issues. She denies abnormal discharge, pain, or unusual bleeding at this point. Minimal pain, significantly improved since surgery. She has had no change in vision including double vision or change in visual acuity. Reports baseline issues with eyes and vision which were present pre-operatively. Mild bruising at small areas on eye lids improving. Family has noticed her eyes look significantly less swollen and seem to be bulging less than they were before surgery. She has taken post-operative medications as prescribed, adhered to post-operative instructions including activity restrictions, and has performed recommended post-operative cares including nasal saline irrigations which she is currently doing twice daily. Recovery has overall followed an expected course for the procedures performed and no other new or unusual associated symptoms are present.     SNOT-22 score = 13  NOSE score = 6    Global QOL assessment ("overall, how do you feel today?" from 1 "very bad" to 10 "very good"): 10 (very good)    Surgical procedures performed on 2/18/2020:  1. Bilateral endoscopic maxillary antrostomy  2. Bilateral endoscopic total ethmoidectomy  3. Bilateral endoscopic sphenoidotomy  4. Bilateral endoscopic frontal sinusotomy.  5. Bilateral orbital decompression with resection of medial orbital walls and periorbita  6. Right valerie bullosa resection  7. Bilateral partial resection of the inferior turbinates with therapeutic outfracture  8. Septoplasty  9. CT stereotactic navigational surgery     Findings " at time of surgery:  Ms. TAMMY Rendon had evidence of bilateral Graves' orbitopathy with increased volume and pressure of orbital contents. Endoscopic orbital decompression performed bilaterally with removal of the lamina papyracea posterior-to-anterior from the orbital apex just anterior to the sphenoid face, to just posterior to the lacrimal sac and duct. Incisions were made horizontally in the periorbita posterior-to-anterior to effectively allow controlled prolapse of orbital fat into the ethmoid and nasal cavity bilaterally. Care was taken to maintain patency of sinus outflow by avoiding obstruction with herniated orbital fat. Sources of nasal obstruction including septal deviation, valerie bullosa, and inferior turbinate hypertrophy were also addressed with excellent results in improving the nasal airway bilaterally. Diffuse oozing throughout these procedures was addressed with bipolar cautery and placement of absorbable hemostatic agents and packing. Excellent hemostasis was achieved with these interventions. Septal splints were placed bilaterally.     Past Medical History  She has a past medical history of CIDP (chronic inflammatory demyelinating polyneuropathy), Grave's disease, Hypothyroidism, Neuropathy, Screening for colorectal cancer, Spondylolisthesis, and Vitamin D deficiency.    Past Surgical History  She has a past surgical history that includes Temporomandibular joint surgery; fractional D&C (10/2006); Dilation and curettage of uterus; Endometrial ablation (10/27/2006); Cataract extraction (Left); Eye surgery (Left); Endoscopic nasal septoplasty (N/A, 2/18/2020); Decompression of orbit (Bilateral, 2/18/2020); and Functional endoscopic sinus surgery (FESS) using computer-assisted navigation (Bilateral, 2/18/2020).    Allergies  She has No Known Allergies.    Medications   She has a current medication list which includes the following prescription(s): duloxetine, fluticasone propionate, gabapentin,  "gralise, hydroxyzine hcl, ibuprofen, immune globulin,gamma(igg), naproxen sodium, oxycodone, prednisone, synthroid, tramadol, and vitamin d.    Review of Systems  Previous ROS reviewed and updated as per HPI with pertinent positives and negatives including:  Eyes: No change in vision, including no new double vision  Nose: Post-operative changes as per HPI, no other new symptoms present  Hematologic: No unusual bleeding  Respiratory: No respiratory distress, nasal breathing changes as per HPI  Neuro: Post-operative pain as per HPI, no other acute neurologic changes    New complete ROS performed today:  Review of Systems   Constitutional: Negative.    HENT: Positive for congestion and sinus pressure. Negative for hoarse voice.    Eyes: Positive for photophobia and visual disturbance.   Respiratory: Negative.    Cardiovascular: Negative.    Gastrointestinal: Negative.    Endocrine: Positive for cold intolerance.   Genitourinary: Negative.    Musculoskeletal: Negative.    Skin: Negative.    Allergic/Immunologic: Negative.    Neurological: Negative.    Hematological: Negative.    Psychiatric/Behavioral: Negative.        Objective:     /60 (BP Location: Right arm, Patient Position: Sitting, BP Method: Small (Manual))   Ht 5' 6" (1.676 m)   Wt 70.8 kg (156 lb)   BMI 25.18 kg/m²        Constitutional:   Vital signs are normal. She appears well-developed and well-nourished. She does not appear ill. No distress. Normal speech.  No hoarse voice and breathy voice.      Head:  Normocephalic and atraumatic. Head is without right periorbital erythema and without left periorbital erythema. No scars. Facial strength is normal.      Ears:    Right Ear: No drainage, swelling or tenderness. No decreased hearing is noted.   Left Ear: No drainage, swelling or tenderness. No decreased hearing is noted.     Nose:  Mucosal edema ( mucosal edema consistent with recent sinonasal surgery) and rhinorrhea (There are secretions and " debris present bilaterally consistent with recent sinonasal surgery) present. No sinus tenderness, septal deviation (Well healing incision from septoplasty, septum straight) or nasal septal hematoma. No epistaxis ( there is no active epistaxis but old blood and clot present bilaterally as expected for recent surgical procedures).  No foreign bodies. Right sinus exhibits no maxillary sinus tenderness and no frontal sinus tenderness. Left sinus exhibits no maxillary sinus tenderness and no frontal sinus tenderness.     Mouth/Throat  Oropharynx clear and moist without lesions or asymmetry. Normal dentition. No oral lesions or mucous membrane lesions. No oropharyngeal exudate, posterior oropharyngeal edema or posterior oropharyngeal erythema.     Neck:  Phonation normal. No edema, no erythema, no stridor and no neck rigidity present.     Pulmonary/Chest:   Effort normal. No stridor. No respiratory distress.     Psychiatric:   She has a normal mood and affect. Her speech is normal.     Neurological:   She has neurological normal, alert and oriented. No cranial nerve deficit or sensory deficit. Coordination and gait (chronic issue related to polyneuropathy) abnormal.     Skin:   No abrasions, lacerations, lesions, or rashes.       Procedure    Endoscopic debridement performed.  See procedure note for details. Crusting, post-operative debris, and retained secretions were removed as planned and indicated for recent endoscopic sinonasal surgery.     There was severe crusting requiring extensive debridement from the ethmoid cavities and over exposed orbital fat, crusted hemostatic materials also present adherent over mucosa with crusting. Steroid eluting stents were partially debrided. Plan for increased irrigations prior to next scheduled follow-up and additional debridement.     Prior to debridement:          Following debridements: Residual material left due to patient tolerance and adherence to  mucosa.                  Data Reviewed    Pathology report indicated non-eosinophilic chronic inflammation.      Part 1   Fragments of respiratory mucosa and cancellous bone (submitted as left sinus   contents):   -Mild chronic inflammation with fibrosis but no eosinophils present within   respiratory mucosa   -No significant histopathologic abnormalities of bone     Part 2   Fragments of respiratory mucosa and cancellous bone (submitted as right sinus   contents):   -Chronic inflammation and fibrosis without eosinophils present within   respiratory mucosa   -No significant histopathologic abnormalities of bone      Operative report reviewed and procedures performed as well as key operative findings are described in history above.      Assessment:     Doing well following bilateral endoscopic sinus surgery and bilateral orbital decompression, along with valerie bullosa resection. There is decompression of the orbits bilaterally with non-obstructive prolapse of orbital fat from resected medial orbital walls into the ethmoid and nasal cavity.  She also underwent septum/turbinate surgery which is unrelated to the reason for today's visit.    1. Status post functional endoscopic sinus surgery (FESS)    2. Nasal crusting    3. Nasal obstruction    4. Graves' ophthalmopathy    5. Optic atrophy, both eyes    6. Valerie bullosa         Plan:     We discussed post-operative cares following surgery and ongoing medical management. Importance of scheduled debridements to remove crusting, debris, and potential scar tissue was reviewed again today, as this is associated with optimal healing and overall improved surgical outcome. Importance of medical therapies following surgery were also reviewed including saline irrigations, to clear debris and prevent obstruction, and particularly to help gently soften and remove residual absorbable hemostatic packing material. All questions were answered. She will continue with medical  management consisting of nasal saline irrigations and topical nasal steroid spray. We will continue to follow her response to surgery and this ongoing medical therapy. Follow-up is scheduled in 1 week. I encouraged her to call with any questions or concerns in the meantime.        Donte Mcdonough MD    Rhinology, Allergy, and Sinus-Skull Base Surgery    Department of Otorhinolaryngology    [facility]    Phone  [phone]    Fax      [phone]

## 2020-03-06 NOTE — PROCEDURES
"Nasal/sinus endoscopy  Date/Time: 3/5/2020 10:00 AM    Time out: Immediately prior to procedure a "time out" was called to verify the correct patient, procedure, equipment, support staff and site/side marked as required.  Performed by: Donte Mcdonough MD  Authorized by: Donte Mcdonough MD     Consent Done?:  Yes (Verbal)  Anesthesia:     Local anesthetic:  4% Xylocaine spray with Quincy-Synephrine    Location: Bilateral.    Patient tolerance:  Patient tolerated the procedure well with no immediate complications  Nose:     Nasal Endoscopy Performed: Endoscopic sinonasal debridement.  External:      No external nasal deformity  Intranasal:      Mucosa no polyps     Mucosa ulcers not present     Mucosa lesions present (crusting and debris, as well as mucosal edema, consistent with recent surgery)     Turbinates not enlarged     No septum gross deformity (well healing septoplasty)  Nasopharynx:      No mucosa lesions     Adenoids not present     Posterior choanae patent     Eustachian tube patent     Procedure Description: The rigid nasal endoscopy was used to assist with debridement of the sinonasal cavities as part of necessary postoperative care. Failure to perform adequate postoperative debridement following endoscopic sinus surgery is known to result in poor healing, scarring and worse surgical outcomes, and thus indicated to promote optimal results from endoscopic sinus surgery. Informed consent was obtained prior to proceeding.     The nasal cavity was decongested and anesthetized.  A rigid nasal endoscope was introduced into the nasal cavity and used to evaluate the sinonasal cavities, mucosa, sinus ostia and turbinates bilaterally. Crusting/necrotic tissue was removed with forceps from the osteomeatal complex bilaterally. Additional debris/necrotic material,blood clots and retained secretions were removed from the nasal cavities and paranasal sinuses bilaterally. The patient tolerated the procedure well and " there were no complications/adverse events.     Summary of Findings:  There was severe crusting requiring extensive debridement from the ethmoid cavities and over exposed orbital fat, crusted hemostatic materials also present adherent over mucosa with crusting. Steroid eluting stents were partially debrided. Orbital fat prolapsing into ethmoid cavities appropriately and as intended through defects of the medial orbital walls created for bilateral orbital decompression. Orbital fat is non-obstructive of sinus ostia/outflow.    Plan for increased irrigations prior to next scheduled follow-up and additional debridement. Overall successful debridement and evaluation with endoscopy with expected post-operative findings.

## 2020-03-12 ENCOUNTER — OFFICE VISIT (OUTPATIENT)
Dept: OTOLARYNGOLOGY | Facility: CLINIC | Age: 55
End: 2020-03-12
Payer: COMMERCIAL

## 2020-03-12 VITALS — BODY MASS INDEX: 25.07 KG/M2 | WEIGHT: 156 LBS | HEIGHT: 66 IN

## 2020-03-12 DIAGNOSIS — J34.89 NASAL OBSTRUCTION: ICD-10-CM

## 2020-03-12 DIAGNOSIS — H47.20 OPTIC ATROPHY, BOTH EYES: ICD-10-CM

## 2020-03-12 DIAGNOSIS — E05.00 GRAVES' OPHTHALMOPATHY: ICD-10-CM

## 2020-03-12 DIAGNOSIS — J01.00 ACUTE NON-RECURRENT MAXILLARY SINUSITIS: Primary | ICD-10-CM

## 2020-03-12 DIAGNOSIS — J34.89 CONCHA BULLOSA: ICD-10-CM

## 2020-03-12 DIAGNOSIS — Z98.890 STATUS POST FUNCTIONAL ENDOSCOPIC SINUS SURGERY (FESS): ICD-10-CM

## 2020-03-12 DIAGNOSIS — J34.89 NASAL CRUSTING: ICD-10-CM

## 2020-03-12 PROCEDURE — 31237 PR NASAL/SINUS ENDOSCOPY,BX/RMV POLYP/DEBRID: ICD-10-PCS | Mod: 50,79,S$GLB, | Performed by: OTOLARYNGOLOGY

## 2020-03-12 PROCEDURE — 87186 SC STD MICRODIL/AGAR DIL: CPT

## 2020-03-12 PROCEDURE — 99214 PR OFFICE/OUTPT VISIT, EST, LEVL IV, 30-39 MIN: ICD-10-PCS | Mod: 25,24,S$GLB, | Performed by: OTOLARYNGOLOGY

## 2020-03-12 PROCEDURE — 3008F PR BODY MASS INDEX (BMI) DOCUMENTED: ICD-10-PCS | Mod: CPTII,S$GLB,, | Performed by: OTOLARYNGOLOGY

## 2020-03-12 PROCEDURE — 87070 CULTURE OTHR SPECIMN AEROBIC: CPT

## 2020-03-12 PROCEDURE — 31237 NSL/SINS NDSC SURG BX POLYPC: CPT | Mod: 50,79,S$GLB, | Performed by: OTOLARYNGOLOGY

## 2020-03-12 PROCEDURE — 3008F BODY MASS INDEX DOCD: CPT | Mod: CPTII,S$GLB,, | Performed by: OTOLARYNGOLOGY

## 2020-03-12 PROCEDURE — 87077 CULTURE AEROBIC IDENTIFY: CPT

## 2020-03-12 PROCEDURE — 99214 OFFICE O/P EST MOD 30 MIN: CPT | Mod: 25,24,S$GLB, | Performed by: OTOLARYNGOLOGY

## 2020-03-12 NOTE — PROGRESS NOTES
"  Subjective:      Alina Rendon is a 54 y.o. female who comes for follow-up 3 weeks status-post endoscopic sinus surgery. Her last clinic visit with me was on 3/5/2020.  She has been doing well overall but has had increased facial pain and pressure on the left around her left eye. Congestion and crusting worse on left relative to right. Some yellow green discharge on left side. She denies other abnormal discharge, pain, or unusual bleeding. She has taken post-operative medications as prescribed, adhered to post-operative instructions including activity restrictions, and has performed recommended post-operative cares including nasal saline irrigations about twice per day. I had also prescribed budesonide irrigations which she is starting.     Global QOL assessment ("overall, how do you feel today?" from 1 "very bad" to 10 "very good"): 9    Surgical procedures performed on 2/18/2020:  1. Bilateral endoscopic maxillary antrostomy  2. Bilateral endoscopic total ethmoidectomy  3. Bilateral endoscopic sphenoidotomy  4. Bilateral endoscopic frontal sinusotomy.  5. Bilateral orbital decompression with resection of medial orbital walls and periorbita  6. Right valerie bullosa resection  7. Bilateral partial resection of the inferior turbinates with therapeutic outfracture  8. Septoplasty  9. CT stereotactic navigational surgery    Past Medical History  She has a past medical history of CIDP (chronic inflammatory demyelinating polyneuropathy), Grave's disease, Hypothyroidism, Neuropathy, Screening for colorectal cancer, Spondylolisthesis, and Vitamin D deficiency.    Past Surgical History  She has a past surgical history that includes Temporomandibular joint surgery; fractional D&C (10/2006); Dilation and curettage of uterus; Endometrial ablation (10/27/2006); Cataract extraction (Left); Eye surgery (Left); Endoscopic nasal septoplasty (N/A, 2/18/2020); Decompression of orbit (Bilateral, 2/18/2020); and Functional " "endoscopic sinus surgery (FESS) using computer-assisted navigation (Bilateral, 2/18/2020).    Allergies  She has No Known Allergies.    Medications   She has a current medication list which includes the following prescription(s): duloxetine, fluticasone propionate, gabapentin, gralise, hydroxyzine hcl, immune globulin,gamma(igg), naproxen sodium, oxycodone, prednisone, synthroid, tramadol, vitamin d, ibuprofen, mupirocin, and sulfamethoxazole-trimethoprim 800-160mg.    Review of Systems  Review of Systems   Constitutional: Negative.    HENT: Positive for congestion and sinus pressure. Negative for hoarse voice.    Eyes: Positive for photophobia and visual disturbance.   Respiratory: Negative.    Cardiovascular: Negative.    Gastrointestinal: Negative.    Endocrine: Negative.    Genitourinary: Negative.    Musculoskeletal: Negative.    Skin: Negative.    Allergic/Immunologic: Negative.    Neurological: Positive for headaches.   Hematological: Negative.    Psychiatric/Behavioral: Negative.        Objective:     Ht 5' 6" (1.676 m)   Wt 70.8 kg (156 lb)   BMI 25.18 kg/m²        Constitutional:   Vital signs are normal. She appears well-developed and well-nourished. She does not appear ill. No distress. Normal speech.  No hoarse voice and breathy voice.      Head:  Normocephalic and atraumatic. Head is without right periorbital erythema and without left periorbital erythema. No scars. Facial strength is normal.      Ears:    Right Ear: No drainage, swelling or tenderness. No decreased hearing is noted.   Left Ear: No drainage, swelling or tenderness. No decreased hearing is noted.     Nose:  Mucosal edema ( mucosal edema consistent with recent sinonasal surgery) and rhinorrhea (There are secretions and debris present bilaterally consistent with recent sinonasal surgery) present. No sinus tenderness, septal deviation (Well healing incision from septoplasty, septum straight) or nasal septal hematoma. No epistaxis ( there " is no active epistaxis but old blood and clot present bilaterally as expected for recent surgical procedures).  No foreign bodies. Right sinus exhibits no maxillary sinus tenderness and no frontal sinus tenderness. Left sinus exhibits no maxillary sinus tenderness and no frontal sinus tenderness.     Mouth/Throat  Oropharynx clear and moist without lesions or asymmetry. Normal dentition. No oral lesions or mucous membrane lesions. No oropharyngeal exudate, posterior oropharyngeal edema or posterior oropharyngeal erythema.     Neck:  Phonation normal. No edema, no erythema, no stridor and no neck rigidity present.     Pulmonary/Chest:   Effort normal. No stridor. No respiratory distress.     Psychiatric:   She has a normal mood and affect. Her speech is normal.     Neurological:   She has neurological normal, alert and oriented. No cranial nerve deficit or sensory deficit. Coordination and gait (chronic issue related to polyneuropathy) abnormal.     Skin:   No abrasions, lacerations, lesions, or rashes.       Procedure    Endoscopic Sinonasal Debridement  Indications: Debridement was indicated for persistent crusting within the sinonasal cavities as part of necessary postoperative care and to promote optimal results from endoscopic sinus surgery. Failure to perform adequate postoperative debridement following endoscopic sinus surgery is known to result in poor healing, scarring, and worse surgical outcomes.      Description of procedure: Rigid nasal endoscopy was used to assist with debridement of the sinonasal cavities. The nasal cavity was decongested and anesthetized.  A rigid nasal endoscope was introduced into the nasal cavity and used to evaluate the sinonasal cavities, mucosa, sinus ostia and turbinates bilaterally. Crusting/necrotic tissue was removed with forceps from the osteomeatal complex bilaterally. Additional crusting removed over orbital fat from orbital decompressions. Purulent material cultured on  the left side. Additional debris/necrotic material, blood clots, and other retained materials were removed from the nasal cavities and paranasal sinuses bilaterally with forceps and suction. The patient tolerated the procedure well and there were no complications/adverse events.      Findings/Assessment: Successful debridement and evaluation with endoscopy. Crusting present and post-operative changes including anticipated level of mucosal edema associated with recent sinus surgery. Overall well healing with patent sinus ostia following debridement. Representative images of key findings from this diagnostic endoscopy procedure and debridement below:    Images from sinonasal endoscopy and debridement:                                        Data Reviewed    Pathology report indicated non-eosinophilic chronic inflammation.       Part 1   Fragments of respiratory mucosa and cancellous bone (submitted as left sinus   contents):   -Mild chronic inflammation with fibrosis but no eosinophils present within   respiratory mucosa   -No significant histopathologic abnormalities of bone     Part 2   Fragments of respiratory mucosa and cancellous bone (submitted as right sinus   contents):   -Chronic inflammation and fibrosis without eosinophils present within   respiratory mucosa   -No significant histopathologic abnormalities of bone      Operative report reviewed and procedures performed as well as key operative findings are described in history above.    Assessment:     Doing well overall following bilateral endoscopic sinus surgery and bilateral orbital decompression, along with valerie bullosa resection. However, there is some purulent drainage and increased pressure on the left suggestive of bacterial sinusitis and post-op infection which was cultured today and will be treated. There is decompression of the orbits bilaterally with non-obstructive prolapse of orbital fat from resected medial orbital walls into the ethmoid and  nasal cavity.  She also underwent septum/turbinate surgery which is unrelated to the reason for today's visit.    1. Acute non-recurrent maxillary sinusitis    2. Status post functional endoscopic sinus surgery (FESS)    3. Nasal crusting    4. Nasal obstruction    5. Graves' ophthalmopathy    6. Optic atrophy, both eyes    7. Sujata bullosa      Plan:     We discussed post-operative cares following surgery and ongoing medical management. Importance of scheduled debridements to remove crusting, debris, and potential scar tissue was reviewed again today, as this is associated with optimal healing and overall improved surgical outcome. Importance of medical therapies following surgery were also reviewed including saline irrigations. All questions were answered. She will continue with medical management consisting of nasal irrigations with topical budesonide in saline solution. A vulture was obtained of purulent material from the left maxillary sinus and from the left orbit medially where orbital fat was coated in purulent material as well. She has elected to proceed with addition of culture-directed antibiotic therapy to treat identified pathogens based on culture results, and a prescription for Bactrim-DS was provided based on culture and sensitivity data after subsequently reviewing this when available. We will continue to follow her response to surgery and this ongoing medical therapy. Follow-up is scheduled in 1 week to follow-up on findings of infection and increased crusting present on today's evaluation. I encouraged her to call with any questions or concerns in the meantime.      Donte Mcdonough MD    Rhinology, Allergy, and Sinus-Skull Base Surgery    Department of Otorhinolaryngology    [facility]    Phone  [phone]    Fax      [phone]

## 2020-03-13 RX ORDER — IBUPROFEN 600 MG/1
600 TABLET ORAL EVERY 6 HOURS PRN
Qty: 30 TABLET | Refills: 1 | Status: SHIPPED | OUTPATIENT
Start: 2020-03-13 | End: 2020-03-19 | Stop reason: SDUPTHER

## 2020-03-14 DIAGNOSIS — H47.20 OPTIC ATROPHY, BOTH EYES: ICD-10-CM

## 2020-03-14 DIAGNOSIS — J01.00 ACUTE NON-RECURRENT MAXILLARY SINUSITIS: ICD-10-CM

## 2020-03-14 DIAGNOSIS — Z98.890 STATUS POST FUNCTIONAL ENDOSCOPIC SINUS SURGERY (FESS): ICD-10-CM

## 2020-03-14 DIAGNOSIS — A49.8 INFECTION DUE TO STAPHYLOCOCCUS EPIDERMIDIS: Primary | ICD-10-CM

## 2020-03-15 LAB — BACTERIA NPH AEROBE CULT: NORMAL

## 2020-03-16 ENCOUNTER — TELEPHONE (OUTPATIENT)
Dept: OTOLARYNGOLOGY | Facility: CLINIC | Age: 55
End: 2020-03-16

## 2020-03-16 RX ORDER — SULFAMETHOXAZOLE AND TRIMETHOPRIM 800; 160 MG/1; MG/1
1 TABLET ORAL 2 TIMES DAILY
Qty: 20 TABLET | Refills: 0 | Status: SHIPPED | OUTPATIENT
Start: 2020-03-16 | End: 2020-03-26

## 2020-03-16 NOTE — TELEPHONE ENCOUNTER
----- Message from Donte Mcdonough MD sent at 3/14/2020  3:40 PM CDT -----  This patient also doesn't have patient portal access it appears... Can you let this patient know that based on the most recent results, though still preliminary, that this is a very resistant infection based on the culture, which is resistant to all possible oral antibiotics tested and only sensitive to 2 drugs that require IV therapy. There is an alternative drug which was not tested but can be used as an oral therapy but may have more risks given her other medical conditions and I'd like to get Infectious Disease to weigh in on it to make sure we treat this in the safest and most appropriate way possible. This was my concern and why I obtained the culture, because she developed the infection despite post-op antibiotics, and she has underlying immune system deficiency which creates additional challenges. This also explains her right face and eye pain as I had discussed with her in clinic.     Would you be able to message ID on Monday about getting her in? I will place a consult as well.     Thank you!

## 2020-03-16 NOTE — TELEPHONE ENCOUNTER
Patient called back saying that after your discussion you had told her that you would send in another prescription to her Pharmacy which is [facility].  I do not see any notes that was documented today from you.  Please advise.

## 2020-03-19 ENCOUNTER — OFFICE VISIT (OUTPATIENT)
Dept: OTOLARYNGOLOGY | Facility: CLINIC | Age: 55
End: 2020-03-19
Payer: COMMERCIAL

## 2020-03-19 VITALS — WEIGHT: 293 LBS | BODY MASS INDEX: 47.09 KG/M2 | HEIGHT: 66 IN

## 2020-03-19 DIAGNOSIS — J34.89 NASAL CRUSTING: ICD-10-CM

## 2020-03-19 DIAGNOSIS — H47.20 OPTIC ATROPHY, BOTH EYES: ICD-10-CM

## 2020-03-19 DIAGNOSIS — A49.8 INFECTION DUE TO STAPHYLOCOCCUS EPIDERMIDIS: ICD-10-CM

## 2020-03-19 DIAGNOSIS — E05.00 GRAVES' OPHTHALMOPATHY: ICD-10-CM

## 2020-03-19 DIAGNOSIS — J01.00 ACUTE NON-RECURRENT MAXILLARY SINUSITIS: Primary | ICD-10-CM

## 2020-03-19 DIAGNOSIS — Z98.890 STATUS POST FUNCTIONAL ENDOSCOPIC SINUS SURGERY (FESS): ICD-10-CM

## 2020-03-19 PROCEDURE — 99214 OFFICE O/P EST MOD 30 MIN: CPT | Mod: 25,24,S$GLB, | Performed by: OTOLARYNGOLOGY

## 2020-03-19 PROCEDURE — 3008F PR BODY MASS INDEX (BMI) DOCUMENTED: ICD-10-PCS | Mod: CPTII,S$GLB,, | Performed by: OTOLARYNGOLOGY

## 2020-03-19 PROCEDURE — 99214 PR OFFICE/OUTPT VISIT, EST, LEVL IV, 30-39 MIN: ICD-10-PCS | Mod: 25,24,S$GLB, | Performed by: OTOLARYNGOLOGY

## 2020-03-19 PROCEDURE — 31237 PR NASAL/SINUS ENDOSCOPY,BX/RMV POLYP/DEBRID: ICD-10-PCS | Mod: 50,79,S$GLB, | Performed by: OTOLARYNGOLOGY

## 2020-03-19 PROCEDURE — 3008F BODY MASS INDEX DOCD: CPT | Mod: CPTII,S$GLB,, | Performed by: OTOLARYNGOLOGY

## 2020-03-19 PROCEDURE — 31237 NSL/SINS NDSC SURG BX POLYPC: CPT | Mod: 50,79,S$GLB, | Performed by: OTOLARYNGOLOGY

## 2020-03-19 RX ORDER — IBUPROFEN 600 MG/1
600 TABLET ORAL EVERY 6 HOURS PRN
Qty: 30 TABLET | Refills: 1 | Status: SHIPPED | OUTPATIENT
Start: 2020-03-19

## 2020-03-19 RX ORDER — MUPIROCIN 20 MG/G
OINTMENT TOPICAL 2 TIMES DAILY
Qty: 22 G | Refills: 0 | Status: SHIPPED | OUTPATIENT
Start: 2020-03-19 | End: 2020-04-02

## 2020-03-19 NOTE — PROGRESS NOTES
Subjective:      Alina Rendon is a 54 y.o. female who comes for follow-up 4 weeks status-post endoscopic sinus surgery and bilateral orbital decompressions for history of Graves' ophthalmopathy with vision loss and optic nerve atrophy. Also underwent septoplasty, valerie bullosa resection, and turbinate reduction surgery. Her last clinic visit with me was on 3/12/2020. She has been doing well overall but had increased facial pain and pressure on the left around her left eye previously when seen a week ago. Congestion and crusting worse on left relative to right and some yellow green discharge on left side, at that time as well. These symptoms have improved significantly overall with increased frequency saline irrigations. I had prescribed Bactrim-DS based on culture results obtained at last visit, though she has not started this yet. She denies other abnormal discharge, pain, or unusual bleeding. She has taken post-operative medications as prescribed, adhered to post-operative instructions including activity restrictions, and has performed recommended post-operative cares.     Surgical procedures performed on 2/18/2020:  1. Bilateral endoscopic maxillary antrostomy  2. Bilateral endoscopic total ethmoidectomy  3. Bilateral endoscopic sphenoidotomy  4. Bilateral endoscopic frontal sinusotomy.  5. Bilateral orbital decompression with resection of medial orbital walls and periorbita  6. Right valerie bullosa resection  7. Bilateral partial resection of the inferior turbinates with therapeutic outfracture  8. Septoplasty  9. CT stereotactic navigational surgery    Past Medical History  She has a past medical history of CIDP (chronic inflammatory demyelinating polyneuropathy), Grave's disease, Hypothyroidism, Neuropathy, Screening for colorectal cancer, Spondylolisthesis, and Vitamin D deficiency.    Past Surgical History  She has a past surgical history that includes Temporomandibular joint surgery; fractional D&C  "(10/2006); Dilation and curettage of uterus; Endometrial ablation (10/27/2006); Cataract extraction (Left); Eye surgery (Left); Endoscopic nasal septoplasty (N/A, 2/18/2020); Decompression of orbit (Bilateral, 2/18/2020); and Functional endoscopic sinus surgery (FESS) using computer-assisted navigation (Bilateral, 2/18/2020).    Allergies  She has No Known Allergies.    Medications   She has a current medication list which includes the following prescription(s): duloxetine, fluticasone propionate, gabapentin, gralise, hydroxyzine hcl, ibuprofen, immune globulin,gamma(igg), naproxen sodium, oxycodone, prednisone, sulfamethoxazole-trimethoprim 800-160mg, synthroid, tramadol, vitamin d, and mupirocin.    Review of Systems  Review of Systems   Constitutional: Negative.    HENT: Positive for sinus pressure. Negative for hoarse voice.    Eyes: Positive for discharge.   Respiratory: Negative.    Cardiovascular: Negative.    Gastrointestinal: Negative.    Endocrine: Negative.    Genitourinary: Negative.    Musculoskeletal: Negative.    Skin: Negative.    Allergic/Immunologic: Positive for immunocompromised state.   Neurological: Positive for headaches.   Hematological: Negative.    Psychiatric/Behavioral: Negative.      Objective:     Ht 5' 6" (1.676 m)   Wt (!) 156 kg (343 lb 14.7 oz)   BMI 55.51 kg/m²        Constitutional:   Vital signs are normal. She appears well-developed and well-nourished. She does not appear ill. No distress. Normal speech.  No hoarse voice and breathy voice.      Head:  Normocephalic and atraumatic. Head is without right periorbital erythema and without left periorbital erythema. No scars. Facial strength is normal.      Ears:    Right Ear: No drainage, swelling or tenderness. No decreased hearing is noted.   Left Ear: No drainage, swelling or tenderness. No decreased hearing is noted.     Nose:  Mucosal edema ( mucosal edema consistent with recent sinonasal surgery) and rhinorrhea (There are " secretions and debris present bilaterally consistent with recent sinonasal surgery) present. No sinus tenderness, septal deviation (Well healing incision from septoplasty, septum straight) or nasal septal hematoma. No epistaxis ( there is no active epistaxis but old blood and clot present bilaterally as expected for recent surgical procedures).  No foreign bodies. Right sinus exhibits no maxillary sinus tenderness and no frontal sinus tenderness. Left sinus exhibits no maxillary sinus tenderness and no frontal sinus tenderness.     Mouth/Throat  Oropharynx clear and moist without lesions or asymmetry. Normal dentition. No oral lesions or mucous membrane lesions. No oropharyngeal exudate, posterior oropharyngeal edema or posterior oropharyngeal erythema.     Neck:  Phonation normal. No edema, no erythema, no stridor and no neck rigidity present.     Pulmonary/Chest:   Effort normal. No stridor. No respiratory distress.     Psychiatric:   She has a normal mood and affect. Her speech is normal.     Neurological:   She has neurological normal, alert and oriented. No cranial nerve deficit or sensory deficit. Coordination and gait (chronic issue related to polyneuropathy) abnormal.     Skin:   No abrasions, lacerations, lesions, or rashes.     Procedure    Endoscopic Sinonasal Debridement  Indications: Debridement was indicated for persistent crusting within the sinonasal cavities as part of necessary postoperative care to promote optimal results from endoscopic sinus surgery, as well as for post-operative infection and findings of purulent debris at last post-operative evaluation. Failure to perform adequate postoperative debridement following endoscopic sinus surgery is known to result in poor healing, scarring, and worse surgical outcomes.      Description of procedure: Rigid nasal endoscopy was used to assist with debridement of the sinonasal cavities. The nasal cavity was decongested and anesthetized.  A rigid nasal  endoscope was introduced into the nasal cavity and used to evaluate the sinonasal cavities, mucosa, sinus ostia and turbinates bilaterally. Crusting/necrotic tissue was removed with forceps from the osteomeatal complex bilaterally. Additional crusting removed over orbital fat from orbital decompressions. Purulent material cultured on the left side. Additional debris/necrotic material, blood clots, and other retained materials were removed from the nasal cavities and paranasal sinuses bilaterally with forceps and suction. The patient tolerated the procedure well and there were no complications/adverse events.      Findings/Assessment: Successful debridement and evaluation with endoscopy. Crusting present and post-operative changes including anticipated level of mucosal edema associated with recent sinus surgery and orbital decompression. Purulent material decreased from prior examination. Orbital fat herniation from the medial orbits appropriately, without obstruction. Overall well healing with patent sinus ostia following debridement.     Data Reviewed  Pathology report indicated non-eosinophilic chronic inflammation.       Part 1   Fragments of respiratory mucosa and cancellous bone (submitted as left sinus   contents):   -Mild chronic inflammation with fibrosis but no eosinophils present within   respiratory mucosa   -No significant histopathologic abnormalities of bone     Part 2   Fragments of respiratory mucosa and cancellous bone (submitted as right sinus   contents):   -Chronic inflammation and fibrosis without eosinophils present within   respiratory mucosa   -No significant histopathologic abnormalities of bone      Operative report reviewed and procedures performed as well as key operative findings are described in history above.    Results of cultures obtained on 3/12/20 at prior evaluation were reviewed and demonstrated normal jose alejandro on final results though multi-resistant staph epidermidis on prior  preliminary results:    3/15 Final results:  RESPIRATORY CULTURE - NASAL Normal respiratory jose alejandro      3/14 Preliminary results:  Staphylococcus epidermidis     CULTURE, RESPIRATORY - NASAL (Preliminary)   Clindamycin >4 mcg/mL R   Erythromycin >4 mcg/mL R   Oxacillin <=0.25 mcg/mL S   Penicillin >8 mcg/mL R   Tetracycline >8 mcg/mL R   Trimeth/Sulfa >2/38 mcg/mL R   Vancomycin 1 mcg/mL S     Assessment:     Doing well overall following bilateral endoscopic sinus surgery and bilateral orbital decompression, along with valerie bullosa resection. Decreased purulent drainage and symptoms on the left, though findings have been suggestive of bacterial sinusitis and post-op infection. There is decompression of the orbits bilaterally with non-obstructive prolapse of orbital fat from resected medial orbital walls into the ethmoid and nasal cavity.  She also underwent septum/turbinate surgery which is unrelated to the reason for today's visit.    1. Acute non-recurrent maxillary sinusitis    2. Graves' ophthalmopathy    3. Optic atrophy, both eyes    4. Status post functional endoscopic sinus surgery (FESS)    5. Infection due to Staphylococcus epidermidis    6. Nasal crusting         Plan:     We discussed post-operative cares following surgery and ongoing medical management. Importance of medical therapies following surgery were reviewed including saline irrigations and budesonide at least for the short-term to encourage optimal healing. She will continue with medical management consisting of nasal irrigations with topical budesonide in saline solution. A culture was previously obtained of purulent material from the left maxillary sinus and from the left orbit medially where orbital fat was coated in purulent material as well - this initially resulted multi-drug resistant Staph epidermidis, but final result was normal jose alejandro. Given clinical evidence of infection, I treated this with addition culture-directed antibiotic  therapy using Bactrim-DS and she will start this as she had not yet obtained from pharmacy. Mupirocin was also prescribed to treat this nasal infection and instructions given for use in the anterior nares as well as use in saline irrigations. For pain management, prescription for ibuprofen was provided. All questions were answered. We will continue to follow her response to surgery and this ongoing medical therapy. Follow-up will be arranged in approximately 1 month, or sooner if needed such as worsening instead of having improvement, and I encouraged her to call with any questions or concerns in the meantime.        Donte Mcdonough MD    Rhinology, Allergy, and Sinus-Skull Base Surgery    Department of Otorhinolaryngology    [facility]    Phone  [phone]    Fax      [phone]

## 2020-03-27 ENCOUNTER — TELEPHONE (OUTPATIENT)
Dept: OTOLARYNGOLOGY | Facility: CLINIC | Age: 55
End: 2020-03-27

## 2020-03-30 NOTE — TELEPHONE ENCOUNTER
Spoke with Patient and she said that she can't take this Antibiotic at all.  She said everytime she takes it, it causes her severe nausea and fever.  I asked her what was her temp and she said she didn't take it.  She read the side effects and fever was one of them.  She stopped taking the Antibiotic that your prescribed.  I asked her even if she was prescribed nausea medicine would she take the Antibiotic, and she replied no!  She said with her condition, there are several medications that does not agree with her, and this one is definitely one that she can't take. Please advise.

## 2020-04-08 ENCOUNTER — OFFICE VISIT (OUTPATIENT)
Dept: OTOLARYNGOLOGY | Facility: CLINIC | Age: 55
End: 2020-04-08
Payer: COMMERCIAL

## 2020-04-08 DIAGNOSIS — J34.89 NASAL VESTIBULITIS: ICD-10-CM

## 2020-04-08 DIAGNOSIS — Z98.890 STATUS POST FUNCTIONAL ENDOSCOPIC SINUS SURGERY (FESS): ICD-10-CM

## 2020-04-08 DIAGNOSIS — J01.00 ACUTE NON-RECURRENT MAXILLARY SINUSITIS: ICD-10-CM

## 2020-04-08 DIAGNOSIS — E05.00 GRAVES' OPHTHALMOPATHY: Primary | ICD-10-CM

## 2020-04-08 DIAGNOSIS — H47.20 OPTIC ATROPHY, BOTH EYES: ICD-10-CM

## 2020-04-08 PROCEDURE — 99213 PR OFFICE/OUTPT VISIT, EST, LEVL III, 20-29 MIN: ICD-10-PCS | Mod: 95,24,, | Performed by: OTOLARYNGOLOGY

## 2020-04-08 PROCEDURE — 99213 OFFICE O/P EST LOW 20 MIN: CPT | Mod: 95,24,, | Performed by: OTOLARYNGOLOGY

## 2020-04-08 RX ORDER — MUPIROCIN 20 MG/G
OINTMENT TOPICAL 2 TIMES DAILY
Qty: 22 G | Refills: 2 | Status: SHIPPED | OUTPATIENT
Start: 2020-04-08 | End: 2020-04-15

## 2020-04-08 NOTE — PROGRESS NOTES
Subjective:      Alina Rendon is a 54 y.o. female who presents for follow-up nearly 2 months status-post endoscopic sinus surgery and bilateral orbital decompressions for history of Graves' ophthalmopathy with vision loss and optic nerve atrophy. Also underwent septoplasty, valerie bullosa resection, and turbinate reduction surgery. The patient's virtual visit today was conducted via telemedicine (video visit) as indicated to reduce risk of transmission of Covid-19 given the current pandemic and safety measures related to this. Her last clinic visit with me was on 3/19/2020.  She has been doing well overall without significant issues and with significant overall improvements in both post-operative recovery from surgery, as well as in pre-operative symptoms. She denies abnormal discharge, pain, or unusual bleeding at this point, only some tenderness in front of nose when applying ointment in anterior nares. No fevers, chills, cough, or other systemic symptoms/ilness. When she bends over she can also feel some pressure in face and orbits, but the pressure in eye which was present before surgery/orbital decompression has resolved now. Black dot in center of left visual field has also resolved since surgery. She has not yet seen ophthalmology but plans to once able to safely go in to their office give Covid-19 concerns currently. She has been performing recommended post-operative cares including nasal saline irrigations which she is doing about 2-3 times per day. Using budesonide in twice daily irrigation. She is getting less debris out now with her irrigations and feels less congested. Using mupirocin in anterior nares. Recovery has overall followed an expected course for the procedures performed and no other new or unusual associated symptoms are present.     Surgical procedures performed on 2/18/2020:  1. Bilateral endoscopic maxillary antrostomy  2. Bilateral endoscopic total ethmoidectomy  3. Bilateral  endoscopic sphenoidotomy  4. Bilateral endoscopic frontal sinusotomy.  5. Bilateral orbital decompression with resection of medial orbital walls and periorbita  6. Right valerie bullosa resection  7. Bilateral partial resection of the inferior turbinates with therapeutic outfracture  8. Septoplasty  9. CT stereotactic navigational surgery    Past Medical History  She has a past medical history of CIDP (chronic inflammatory demyelinating polyneuropathy), Grave's disease, Hypothyroidism, Neuropathy, Screening for colorectal cancer, Spondylolisthesis, and Vitamin D deficiency.    Past Surgical History  She has a past surgical history that includes Temporomandibular joint surgery; fractional D&C (10/2006); Dilation and curettage of uterus; Endometrial ablation (10/27/2006); Cataract extraction (Left); Eye surgery (Left); Endoscopic nasal septoplasty (N/A, 2/18/2020); Decompression of orbit (Bilateral, 2/18/2020); and Functional endoscopic sinus surgery (FESS) using computer-assisted navigation (Bilateral, 2/18/2020).    Allergies  She has No Known Allergies.    Medications   She has a current medication list which includes the following prescription(s): duloxetine, fluticasone propionate, gabapentin, gralise, hydroxyzine hcl, ibuprofen, immune globulin,gamma(igg), mupirocin, naproxen sodium, oxycodone, prednisone, synthroid, tramadol, and vitamin d.    Review of Systems    Review of Systems   Constitutional: Negative.  Negative for chills and fever.   HENT: Positive for sinus pressure. Negative for facial swelling, nosebleeds, rhinorrhea and sinus pain.    Eyes: Negative for pain and visual disturbance.   Respiratory: Negative.  Negative for cough and shortness of breath.    Cardiovascular: Negative.    Gastrointestinal: Negative.    Endocrine: Negative.    Genitourinary: Negative.    Musculoskeletal: Negative.    Skin: Negative.  Negative for color change and rash.   Allergic/Immunologic: Positive for immunocompromised  state. Negative for environmental allergies.   Neurological: Positive for headaches. Negative for dizziness.   Hematological: Negative.    Psychiatric/Behavioral: Negative.      Objective:     There were no vitals taken for this visit.     Constitutional:   She is oriented to person, place, and time. She appears well-developed and well-nourished. She is cooperative. She does not have a sickly appearance, appear ill, or demonstrate any distress. Normal speech without hoarse, breathy, or strained voice.    Head:  Normocephalic and atraumatic to visual inspection by video visit. Head is without abrasion, contusion or laceration. Hair is normal. No scars or skin lesions. Facial strength is normal and symmetric.  Ears:  Right Ear: Auricle normal in appearance and position without proptosis. No lacerations. No drainage or swelling. No decreased hearing is noted.   Left Ear: Auricle normal in appearance and position without proptosis. No lacerations. No drainage or swelling. No decreased hearing is noted.   Eyes:  Sclera are clear bilaterally, pupils are equal and round, and there is no discharge or drainage present. No resting nystagmus present. Lid position appears normal and symmetric. Globe positioning appears grossly symmetric without enophthalmos or exophthalmos.   Nose:  External nose is normal without gross asymmetry or deformity. Nasal skin is normal. The columella and nasal tip are straight without significant deviation. No anterior rhinorrhea, epistaxis, or abnormal findings in the nasal vestibules/anterior nares. No reported sinus tenderness. Turbinates and mucosal edema/inflammation unable to assess (video visit). Nasal breathing grossly intact without mouth breathing and or significant hyponasal voice quality.  Mouth/Throat  Normal dentition, lips, and oral movement. Normal oral competence and ability to manage secretions with intact swallow.  Neck:  Trachea normal appearance midline, phonation normal and  full range of motion of neck. No visible neck masses or swelling, and no apparent edema or erythema. There is no audible stridor.   Pulmonary/Chest:   Effort normal. No audible stridor or wheeze. No apnea, respiratory distress, or shortness of breath.   Psychiatric:   She has a normal mood and affect. Her speech is normal and behavior is normal.   Neurological:   She is neurologically normal, alert, and oriented to person, place, and time. No cranial nerve deficit (of facial expression, mastication, extraoccular movement, tongue movement, or pupillary function - by video visit assessment) or sensory deficit (by patient reported, as not physically evaluated by palpation due to video visit). Coordination of movements is grossly normal and there is no tremor present.  Skin:   No abrasions, lacerations, lesions, or rashes of visible skin.     Procedure    None    Data Reviewed    Pathology report indicated non-eosinophilic chronic inflammation.       Part 1   Fragments of respiratory mucosa and cancellous bone (submitted as left sinus   contents):   -Mild chronic inflammation with fibrosis but no eosinophils present within   respiratory mucosa   -No significant histopathologic abnormalities of bone     Part 2   Fragments of respiratory mucosa and cancellous bone (submitted as right sinus   contents):   -Chronic inflammation and fibrosis without eosinophils present within   respiratory mucosa   -No significant histopathologic abnormalities of bone      Operative report reviewed and procedures performed as well as key operative findings are described in history above.    Assessment:     Doing well overall following bilateral endoscopic sinus surgery and bilateral orbital decompression, along with valerie bullosa resection. Significant improvements in post-operative recovery as well as improvements and resolution of pre-operative symptoms including orbital pain and vision changes. She also underwent septum/turbinate surgery  which is unrelated to the reason for today's visit.    1. Graves' ophthalmopathy    2. Nasal vestibulitis    3. Acute non-recurrent maxillary sinusitis    4. Optic atrophy, both eyes    5. Status post functional endoscopic sinus surgery (FESS)         Plan:     We discussed post-operative cares following surgery and ongoing medical management. Importance of medical therapies following surgery were reviewed including saline irrigations and budesonide at least for the short-term to encourage optimal healing. She will continue with medical management consisting of nasal irrigations with topical budesonide in saline solution. She has had resolution of discolored nasal discharge and no symptoms of sinusitis, so no additional antibiotic treatment prescribed aside from mupirocin ointment for topical use for crusting and vestibulitis which was prescribed today. Previously prescribed antibiotic for findings of purulent sinusitis was not tolerated due to GI side effects and therefore was not completed. I have advised observation for any worsening or concerns at this point given she is overall doing well and continues to have improvement. Follow-up will be arranged after she is able to see ophthalmology and she will call to schedule once that date is known, or contact us for follow-up sooner if needed. I encouraged her to call with any questions or concerns in the meantime.          Donte Mcdonough MD    Rhinology, Allergy, and Sinus-Skull Base Surgery    Department of Otorhinolaryngology    [facility]    Phone  [phone]    Fax      [phone]

## 2020-04-19 DIAGNOSIS — Z98.890 STATUS POST FUNCTIONAL ENDOSCOPIC SINUS SURGERY (FESS): ICD-10-CM

## 2020-04-19 DIAGNOSIS — J01.00 ACUTE NON-RECURRENT MAXILLARY SINUSITIS: ICD-10-CM

## 2020-04-19 DIAGNOSIS — A49.8 INFECTION DUE TO STAPHYLOCOCCUS EPIDERMIDIS: ICD-10-CM

## 2020-04-19 RX ORDER — IBUPROFEN 600 MG/1
600 TABLET ORAL EVERY 6 HOURS PRN
Qty: 30 TABLET | Refills: 1 | OUTPATIENT
Start: 2020-04-19

## 2020-05-19 ENCOUNTER — TELEPHONE (OUTPATIENT)
Dept: OPHTHALMOLOGY | Facility: CLINIC | Age: 55
End: 2020-05-19

## 2020-05-19 NOTE — TELEPHONE ENCOUNTER
----- Message from Tabitha Haines sent at 5/19/2020  1:28 PM CDT -----  Contact: (pt)  Reason; pt is experiencing some discomfort and ask for a call to advise    Communication; [phone]

## 2020-05-22 ENCOUNTER — TELEPHONE (OUTPATIENT)
Dept: OTOLARYNGOLOGY | Facility: CLINIC | Age: 55
End: 2020-05-22

## 2020-05-22 NOTE — TELEPHONE ENCOUNTER
Called to reschedule patients appt that was canceled due to covid. Patient states she is not getting out for appts at this time will call the office for a virtual if she needs anything.

## 2020-05-26 ENCOUNTER — TELEPHONE (OUTPATIENT)
Dept: ENDOCRINOLOGY | Facility: CLINIC | Age: 55
End: 2020-05-26

## 2020-05-26 DIAGNOSIS — E55.9 VITAMIN D DEFICIENCY DISEASE: ICD-10-CM

## 2020-05-26 DIAGNOSIS — E89.0 POSTABLATIVE HYPOTHYROIDISM: Primary | ICD-10-CM

## 2020-05-26 NOTE — TELEPHONE ENCOUNTER
----- Message from Jayla Kelly sent at 5/26/2020 12:55 PM CDT -----  Contact: self  Pt ask for a call in regards to having questions concerning her up coming appointment    Contact info  [phone]

## 2020-06-04 ENCOUNTER — TELEPHONE (OUTPATIENT)
Dept: ENDOCRINOLOGY | Facility: CLINIC | Age: 55
End: 2020-06-04

## 2020-06-04 NOTE — TELEPHONE ENCOUNTER
----- Message from Antonio Mir MA sent at 6/3/2020  4:44 PM CDT -----  Contact: Alina   tel:   [phone]  Good afternoon Elisha, spoke with patient states she went to [facility] to have labs drawn. No labs was there. Patient would like to have labs fax to [facility] tomorrow for visit next week. Thank you and please have a great day.    Parvizi  ----- Message -----  From: Rosa Maria Ruiz  Sent: 6/3/2020   4:27 PM CDT  To: [facility] Ferguson Staff    Caller says she went to have her labs done and there were no orders.    [facility]

## 2020-06-11 NOTE — PROGRESS NOTES
Left message for patient to confirm there appointment , and offered Virtual Video appointment on the voice mail.

## 2020-06-12 ENCOUNTER — CLINICAL SUPPORT (OUTPATIENT)
Dept: OPHTHALMOLOGY | Facility: CLINIC | Age: 55
End: 2020-06-12
Payer: COMMERCIAL

## 2020-06-12 ENCOUNTER — OFFICE VISIT (OUTPATIENT)
Dept: ENDOCRINOLOGY | Facility: CLINIC | Age: 55
End: 2020-06-12
Payer: COMMERCIAL

## 2020-06-12 ENCOUNTER — OFFICE VISIT (OUTPATIENT)
Dept: OPHTHALMOLOGY | Facility: CLINIC | Age: 55
End: 2020-06-12
Payer: COMMERCIAL

## 2020-06-12 VITALS
WEIGHT: 293 LBS | SYSTOLIC BLOOD PRESSURE: 124 MMHG | HEIGHT: 66 IN | DIASTOLIC BLOOD PRESSURE: 78 MMHG | BODY MASS INDEX: 47.09 KG/M2

## 2020-06-12 DIAGNOSIS — H47.20 OPTIC ATROPHY, BOTH EYES: ICD-10-CM

## 2020-06-12 DIAGNOSIS — Z86.39 HX OF GRAVES' DISEASE: ICD-10-CM

## 2020-06-12 DIAGNOSIS — E55.9 VITAMIN D DEFICIENCY DISEASE: ICD-10-CM

## 2020-06-12 DIAGNOSIS — E05.00 GRAVES' ORBITOPATHY: ICD-10-CM

## 2020-06-12 DIAGNOSIS — E89.0 POSTABLATIVE HYPOTHYROIDISM: Primary | ICD-10-CM

## 2020-06-12 DIAGNOSIS — H53.413 CENTRAL SCOTOMA, BOTH EYES: Primary | ICD-10-CM

## 2020-06-12 PROCEDURE — 99214 OFFICE O/P EST MOD 30 MIN: CPT | Mod: S$GLB,,, | Performed by: INTERNAL MEDICINE

## 2020-06-12 PROCEDURE — 92133 CPTRZD OPH DX IMG PST SGM ON: CPT | Mod: S$GLB,,, | Performed by: OPHTHALMOLOGY

## 2020-06-12 PROCEDURE — 99999 PR PBB SHADOW E&M-EST. PATIENT-LVL II: CPT | Mod: PBBFAC,,, | Performed by: OPHTHALMOLOGY

## 2020-06-12 PROCEDURE — 3008F BODY MASS INDEX DOCD: CPT | Mod: CPTII,S$GLB,, | Performed by: INTERNAL MEDICINE

## 2020-06-12 PROCEDURE — 99999 PR PBB SHADOW E&M-EST. PATIENT-LVL II: ICD-10-PCS | Mod: PBBFAC,,, | Performed by: OPHTHALMOLOGY

## 2020-06-12 PROCEDURE — 92083 HUMPHREY VISUAL FIELD - OU - BOTH EYES: ICD-10-PCS | Mod: S$GLB,,, | Performed by: OPHTHALMOLOGY

## 2020-06-12 PROCEDURE — 92012 PR EYE EXAM, EST PATIENT,INTERMED: ICD-10-PCS | Mod: S$GLB,,, | Performed by: OPHTHALMOLOGY

## 2020-06-12 PROCEDURE — 92133 POSTERIOR SEGMENT OCT OPTIC NERVE(OCULAR COHERENCE TOMOGRAPHY) - OU - BOTH EYES: ICD-10-PCS | Mod: S$GLB,,, | Performed by: OPHTHALMOLOGY

## 2020-06-12 PROCEDURE — 99999 PR PBB SHADOW E&M-EST. PATIENT-LVL III: CPT | Mod: PBBFAC,,, | Performed by: INTERNAL MEDICINE

## 2020-06-12 PROCEDURE — 99999 PR PBB SHADOW E&M-EST. PATIENT-LVL III: ICD-10-PCS | Mod: PBBFAC,,, | Performed by: INTERNAL MEDICINE

## 2020-06-12 PROCEDURE — 92012 INTRM OPH EXAM EST PATIENT: CPT | Mod: S$GLB,,, | Performed by: OPHTHALMOLOGY

## 2020-06-12 PROCEDURE — 99214 PR OFFICE/OUTPT VISIT, EST, LEVL IV, 30-39 MIN: ICD-10-PCS | Mod: S$GLB,,, | Performed by: INTERNAL MEDICINE

## 2020-06-12 PROCEDURE — 3008F PR BODY MASS INDEX (BMI) DOCUMENTED: ICD-10-PCS | Mod: CPTII,S$GLB,, | Performed by: INTERNAL MEDICINE

## 2020-06-12 PROCEDURE — 92083 EXTENDED VISUAL FIELD XM: CPT | Mod: S$GLB,,, | Performed by: OPHTHALMOLOGY

## 2020-06-12 RX ORDER — NAPROXEN SODIUM 550 MG/1
TABLET ORAL
COMMUNITY
Start: 2020-03-13 | End: 2024-02-15

## 2020-06-12 RX ORDER — CYANOCOBALAMIN (VITAMIN B-12) 1000 MCG
200 TABLET, EXTENDED RELEASE ORAL DAILY
COMMUNITY
Start: 2020-06-12 | End: 2023-06-09

## 2020-06-12 NOTE — PROGRESS NOTES
24-2  Sf done ou     Rel & Fix =  Good ou      Coop =     Good     Patient denies any allergies to latex/eye patch at this time     jthomas

## 2020-06-12 NOTE — PROGRESS NOTES
Subjective:      Patient ID: Alina Rendon is a 54 y.o. female.    Chief Complaint:  Hypothyroidism      History of Present Illness  Ms. ATMMY Rendon presents for follow up of hypothyroidism. Last seen 02/2018.     With hypothyroidism s/p I-131 tx on 1999 for Graves' disease and is taking Synthroid 93 mcg daily and she does this by taking 100 mcg daily except for Sunday that she takes 1/2 tab.  Takes thyroid hormone on empty stomach and separate from other meds. No missed doses.      Has had more fatigue recently and has gained 10 lbs since last visit. She has not been as active during the COVID pandemic.      Has Graves' orbitopathy and underwent bilateral orbital decompression in 2/2020. She was seen by Dr. James today who feels the optic nerve injury may be permanent. She is being referred to Dr. Hough to see if she would benefit from another orbital decompression or immunotherapy.      For vitamin D deficiency she is taking vitamin D 1000 daily. No hx of fractures.     Has CIDP/ autoimmune neuropathy that is being treated with plasmapheresis 3 times per month. No recent steroid therapy.        Review of Systems   Constitutional: Negative for chills and fever.   Gastrointestinal: Negative for nausea.       Objective:   Physical Exam   Nursing note and vitals reviewed.  No thyromegaly  Mild tremor of outstretched hands  No tachycardia    BP Readings from Last 3 Encounters:   06/12/20 124/78   03/05/20 100/60   02/27/20 110/60     Wt Readings from Last 1 Encounters:   06/12/20 1058 (!) 155.6 kg (343 lb)       Body mass index is 55.36 kg/m².    Lab Review:   No results found for: HGBA1C  No results found for: CHOL, HDL, LDLCALC, TRIG, CHOLHDL  Lab Results   Component Value Date     02/12/2020    K 4.3 02/12/2020     02/12/2020    CO2 22 (L) 02/12/2020    GLU 84 02/12/2020    BUN 17 02/12/2020    CREATININE 0.7 02/12/2020    CALCIUM 9.5 02/12/2020    PROT 8.1 01/30/2018    ALBUMIN 4.1 01/30/2018    BILITOT  0.4 01/30/2018    ALKPHOS 50 01/30/2018    AST 22 01/30/2018    ALT 12 01/30/2018    ANIONGAP 12 02/12/2020    ESTGFRAFRICA >60 02/12/2020    EGFRNONAA >60 02/12/2020    TSH 0.86 01/30/2018         Assessment and Plan     Postablative hypothyroidism  --Patient previously euthyroid on the current dose of thyroid hormone  --[facility]  --Will continue current dose of Synthroid 100mcg M-Sat with 50 mcg on Sun pending repeat TSH    Hx of Graves' disease  --S/p ROJAS ablation in 1999  --Will check antibody status now with TRAb  --Advised her to start selenium 200 mcg once daily    Graves' orbitopathy  --patient with severe Graves orbitopathy with optic nerve involvement bilaterally  --status post bilateral orbital decompression in 02/2020  --seen by Dr. James today who feels that the optic nerve damage may be permanent  --she has been referred to Dr. Hough to see if she would be a candidate for another orbital decompression or immunotherapy    RTC in 1 year.    Marty Markham M.D. Staff Endocrinology

## 2020-06-12 NOTE — PROGRESS NOTES
HPI     Concerns About Ocular Health      Additional comments: Graves' Orbitopathy              Comments     DLS:01/15/2020 Erika  Hx of chronic inflammatory demyelinating polyneuropathy.  Hx of orbital decompression surg-02/2020.  Patient here for overdue 2 month follow up Graves'.  No vision changes.  Occasional stabbing eye pain  Review HVF    I have personally interviewed the patient, reviewed the history and   examined the patient and agree with the technician's exam.          Last edited by Donte James MD on 6/12/2020  9:20 AM. (History)            Assessment /Plan     For exam results, see Encounter Report.    Central scotoma, both eyes  -     Dasilva Visual Field - OU - Extended - Both Eyes  -     Posterior Segment OCT Optic Nerve- Both eyes    Graves' orbitopathy  -     Dasilva Visual Field - OU - Extended - Both Eyes  -     Posterior Segment OCT Optic Nerve- Both eyes    Optic atrophy, both eyes  -     Dasilva Visual Field - OU - Extended - Both Eyes  -     Posterior Segment OCT Optic Nerve- Both eyes      Ms. Sharma's visual function did not improve following medial wall orbital decompression. She has evidence the the optic nerve damage is permanent. CIDP should not be an issue related to the optic atrophy because the condition affects peripheral nerves, not central myelin. I will arrange an evaluation by Dr. Hough to see if he feels that additional orbital decompression surgery or treatment with anti-inflammatory agents would be valuable.

## 2020-06-12 NOTE — ASSESSMENT & PLAN NOTE
--S/p ROJAS ablation in 1999  --Will check antibody status now with TRAb  --Advised her to start selenium 200 mcg once daily

## 2020-06-12 NOTE — ASSESSMENT & PLAN NOTE
--patient with severe Graves orbitopathy with optic nerve involvement bilaterally  --status post bilateral orbital decompression in 02/2020  --seen by Dr. James today who feels that the optic nerve damage may be permanent  --she has been referred to Dr. Hough to see if she would be a candidate for another orbital decompression or immunotherapy

## 2020-06-12 NOTE — ASSESSMENT & PLAN NOTE
--Patient previously euthyroid on the current dose of thyroid hormone  --Will repeat TSH now at [facility]  --Will continue current dose of Synthroid 100mcg M-Sat with 50 mcg on Sun pending repeat TSH

## 2020-06-18 ENCOUNTER — TELEPHONE (OUTPATIENT)
Dept: OPHTHALMOLOGY | Facility: CLINIC | Age: 55
End: 2020-06-18

## 2020-06-18 NOTE — TELEPHONE ENCOUNTER
----- Message from Carmen Yugn sent at 6/18/2020  1:43 PM CDT -----  Regarding: Sooner appointment  Contact: Alina Sharma would like know if she can get a sooner appointment. She can be reached at [phone]. She says that it's important

## 2020-07-09 ENCOUNTER — TELEPHONE (OUTPATIENT)
Dept: ENDOCRINOLOGY | Facility: CLINIC | Age: 55
End: 2020-07-09

## 2020-07-09 NOTE — TELEPHONE ENCOUNTER
----- Message from Abril Patterson sent at 7/9/2020  1:41 PM CDT -----  Regarding: Test results  Type:  Test Results    Who Called: patient need lab results  Name of Test (Lab/Mammo/Etc): lab  Date of Test:   Ordering Provider: Dr Markham  Where the test was performed:   Would the patient rather a call back or a response via [facility]? call  Best Call Back Number: [phone]  Additional Information:

## 2020-07-10 NOTE — TELEPHONE ENCOUNTER
Spoke to patient and asked were did she get her labs done at? She said she went to [facility] , I told her I will call them now.

## 2020-07-23 ENCOUNTER — TELEPHONE (OUTPATIENT)
Dept: ENDOCRINOLOGY | Facility: CLINIC | Age: 55
End: 2020-07-23

## 2020-07-23 NOTE — TELEPHONE ENCOUNTER
----- Message from Bere Garay sent at 7/23/2020  1:42 PM CDT -----  Regarding: Results  Pt is calling to speak with Staff regarding her test results.    She can be reached at [phone].    Thank you.

## 2020-07-29 ENCOUNTER — TELEPHONE (OUTPATIENT)
Dept: ENDOCRINOLOGY | Facility: CLINIC | Age: 55
End: 2020-07-29

## 2020-07-29 DIAGNOSIS — Z86.39 HX OF GRAVES' DISEASE: Primary | ICD-10-CM

## 2020-07-29 NOTE — TELEPHONE ENCOUNTER
Spoke to patient and let her that [facility] doesn't have any record of her labs. I told her I would have Dr Markham re order her labs an I will also mail them to her.

## 2020-07-29 NOTE — TELEPHONE ENCOUNTER
----- Message from James Zepeda sent at 7/29/2020  1:14 PM CDT -----  Regarding: Pt Inquiry  Pt called requesting Lab Work Order / Prescription Refill       [phone] (home)

## 2020-07-29 NOTE — TELEPHONE ENCOUNTER
----- Message from Rosa Maria Ruiz sent at 7/29/2020  3:56 PM CDT -----  Contact: [phone]  Caller says she is trying to locate her [facility] blood work.    Pls call her at [phone]  .   Pt. Says she got a call about this.     Needs to discuss it further.    Caller says she is upset about this.

## 2020-07-30 ENCOUNTER — OFFICE VISIT (OUTPATIENT)
Dept: OPHTHALMOLOGY | Facility: CLINIC | Age: 55
End: 2020-07-30
Payer: COMMERCIAL

## 2020-07-30 DIAGNOSIS — H47.20 OPTIC ATROPHY, BOTH EYES: ICD-10-CM

## 2020-07-30 DIAGNOSIS — H02.536 EYELID RETRACTION OF BOTH EYES: ICD-10-CM

## 2020-07-30 DIAGNOSIS — G61.81 CIDP (CHRONIC INFLAMMATORY DEMYELINATING POLYNEUROPATHY): ICD-10-CM

## 2020-07-30 DIAGNOSIS — E05.00 GRAVES' ORBITOPATHY: Primary | ICD-10-CM

## 2020-07-30 DIAGNOSIS — H53.413 CENTRAL SCOTOMA, BOTH EYES: ICD-10-CM

## 2020-07-30 DIAGNOSIS — H02.533 EYELID RETRACTION OF BOTH EYES: ICD-10-CM

## 2020-07-30 PROCEDURE — 92285 EXTERNAL PHOTOGRAPHY - OU - BOTH EYES: ICD-10-PCS | Mod: S$GLB,,, | Performed by: OPHTHALMOLOGY

## 2020-07-30 PROCEDURE — 99999 PR PBB SHADOW E&M-EST. PATIENT-LVL II: ICD-10-PCS | Mod: PBBFAC,,, | Performed by: OPHTHALMOLOGY

## 2020-07-30 PROCEDURE — 92285 EXTERNAL OCULAR PHOTOGRAPHY: CPT | Mod: S$GLB,,, | Performed by: OPHTHALMOLOGY

## 2020-07-30 PROCEDURE — 92014 COMPRE OPH EXAM EST PT 1/>: CPT | Mod: S$GLB,,, | Performed by: OPHTHALMOLOGY

## 2020-07-30 PROCEDURE — 99999 PR PBB SHADOW E&M-EST. PATIENT-LVL II: CPT | Mod: PBBFAC,,, | Performed by: OPHTHALMOLOGY

## 2020-07-30 PROCEDURE — 92014 PR EYE EXAM, EST PATIENT,COMPREHESV: ICD-10-PCS | Mod: S$GLB,,, | Performed by: OPHTHALMOLOGY

## 2020-07-30 NOTE — PROGRESS NOTES
HPI     Patient here for graves'. Referred by: Dr. James at [facility]. Patient c/o blurry vision + black dot in center of vision   and denies any other complaints.   Spontaneous orbital pain? Yes.  Orbital pain w eye movement? Yes.  Red eyes? No.  Red eyelids? no  Eyelid swelling? no    MEDICATIONS:  predniSONE (DELTASONE) 10 MG tablet    SX HX:  02/18/2020: s/p Endoscopic nasal septoplasty, Dr. Donte Mcdonough (ENT)  02/18/2020: s/p Decompression of orbit (Bilateral), Dr. Donte Mcdonough   (ENT)  02/18/2020: s/p Functional endoscopic sinus surgery (fess) using   computer-assisted navigation (Bilateral), Dr. Donte Mcdonough (ENT)  09/11/21019: s/p cataract extraction, Dr. Kaushik Womack    [facility] HX:  Central scotoma OU  Optic atrophy OU  Graves' orbitopathy  Grave's disease  Hypothyroidism    Last edited by Alonso Ruelas on 7/30/2020 10:31 AM. (History)        Assessment /Plan     For exam results, see Encounter Report.    Graves' orbitopathy  -     External Photography - OU - Both Eyes  -     CT Orbits Sella Post Fossa Without Cont; Future; Expected date: 07/30/2020    Eyelid retraction of both eyes    Central scotoma, both eyes    Optic atrophy, both eyes    CIDP (chronic inflammatory demyelinating polyneuropathy)    Patient is a pleasant 55 yo  female referred by Dr. James for thyroid eye disease. Patient does not smoke anymore. Patient underwent radioactive iodine therapy in 1999. Patient currently receives plasmaphoresis for CIDP.    Examination demonstrates bilateral optic nerve pallor, left greater than right, and signs of thyroid eye diseaes.    Reviewed treatment options: Tepezza, additional orbit surgery, and radiation.  Advised patient that Tepezza is new drug, that may have undiscovered side effects.    Patient opted for Tepezza    Will need glucose monitoring by Dr. Markham. Patient already followed by Dr. Markham for post-ablative hypothyroidism. Patient with complicated medical  picture with history of CIDP.     Clinical Activity Score  Date: 7/30/2020  Spontaneous Orbital Pain: Yes  Gaze-evoked Orbital Pain: Yes  Eyelid swelling that is considered to be due to Active STEVIE: Yes  Eyelid erythema: Yes  Conjunctival redness that is considered to be due to Active STEVIE: Yes  Chemosis: No  Inflammation of caruncle or plica: Yes    Plan for CT, HVF (in 1 week), photos EOM with motility and disc    Return to see me 6 weeks after initial teprotumumab infusion.       I have reviewed and concur with the resident's history, physical, assessment, and plan.  I have personally interviewed and examined the patient.

## 2020-08-03 ENCOUNTER — TELEPHONE (OUTPATIENT)
Dept: OPHTHALMOLOGY | Facility: CLINIC | Age: 55
End: 2020-08-03

## 2020-08-03 DIAGNOSIS — E05.00 GRAVES' ORBITOPATHY: Primary | ICD-10-CM

## 2020-08-06 ENCOUNTER — HOSPITAL ENCOUNTER (OUTPATIENT)
Dept: RADIOLOGY | Facility: HOSPITAL | Age: 55
Discharge: HOME | End: 2020-08-06
Attending: OPHTHALMOLOGY
Payer: COMMERCIAL

## 2020-08-06 ENCOUNTER — CLINICAL SUPPORT (OUTPATIENT)
Dept: OPHTHALMOLOGY | Facility: CLINIC | Age: 55
End: 2020-08-06
Payer: COMMERCIAL

## 2020-08-06 DIAGNOSIS — H53.413 CENTRAL SCOTOMA, BOTH EYES: Primary | ICD-10-CM

## 2020-08-06 DIAGNOSIS — E55.9 VITAMIN D DEFICIENCY DISEASE: ICD-10-CM

## 2020-08-06 DIAGNOSIS — E05.00 GRAVES' ORBITOPATHY: ICD-10-CM

## 2020-08-06 DIAGNOSIS — H47.20 OPTIC ATROPHY, BOTH EYES: ICD-10-CM

## 2020-08-06 DIAGNOSIS — E89.0 POSTABLATIVE HYPOTHYROIDISM: ICD-10-CM

## 2020-08-06 DIAGNOSIS — Z86.39 HX OF GRAVES' DISEASE: Primary | ICD-10-CM

## 2020-08-06 PROCEDURE — 70480 CT ORBITS SELLA POST FOSSA WITHOUT CONT: ICD-10-PCS | Mod: 26,,, | Performed by: RADIOLOGY

## 2020-08-06 PROCEDURE — 70480 CT ORBIT/EAR/FOSSA W/O DYE: CPT | Mod: 26,,, | Performed by: RADIOLOGY

## 2020-08-06 PROCEDURE — 70480 CT ORBIT/EAR/FOSSA W/O DYE: CPT | Mod: TC

## 2020-08-07 ENCOUNTER — PATIENT MESSAGE (OUTPATIENT)
Dept: ENDOCRINOLOGY | Facility: CLINIC | Age: 55
End: 2020-08-07

## 2020-08-07 DIAGNOSIS — E89.0 POSTABLATIVE HYPOTHYROIDISM: Primary | ICD-10-CM

## 2020-08-07 RX ORDER — LEVOTHYROXINE SODIUM 112 UG/1
112 TABLET ORAL
Qty: 30 TABLET | Refills: 11 | Status: SHIPPED | OUTPATIENT
Start: 2020-08-07 | End: 2021-06-24

## 2020-10-23 DIAGNOSIS — Z11.59 NEED FOR HEPATITIS C SCREENING TEST: ICD-10-CM

## 2020-11-24 ENCOUNTER — TELEPHONE (OUTPATIENT)
Dept: OPHTHALMOLOGY | Facility: CLINIC | Age: 55
End: 2020-11-24

## 2020-11-24 NOTE — TELEPHONE ENCOUNTER
----- Message from Mani Hong sent at 11/24/2020 11:47 AM CST -----  Regarding: Rx  Contact: Meghan Christianson with [facility] is calling to verify if fax was received for Rx refill    Call back # [phone]  Opt 1 and then Opt 6

## 2021-04-12 ENCOUNTER — PATIENT MESSAGE (OUTPATIENT)
Dept: ADMINISTRATIVE | Facility: HOSPITAL | Age: 56
End: 2021-04-12

## 2021-04-15 ENCOUNTER — PATIENT MESSAGE (OUTPATIENT)
Dept: RESEARCH | Facility: HOSPITAL | Age: 56
End: 2021-04-15

## 2021-07-16 ENCOUNTER — TELEPHONE (OUTPATIENT)
Dept: ENDOCRINOLOGY | Facility: CLINIC | Age: 56
End: 2021-07-16

## 2021-07-16 DIAGNOSIS — E89.0 POSTABLATIVE HYPOTHYROIDISM: Primary | ICD-10-CM

## 2021-08-02 ENCOUNTER — TELEPHONE (OUTPATIENT)
Dept: OPHTHALMOLOGY | Facility: CLINIC | Age: 56
End: 2021-08-02

## 2021-10-15 ENCOUNTER — OFFICE VISIT (OUTPATIENT)
Dept: ENDOCRINOLOGY | Facility: CLINIC | Age: 56
End: 2021-10-15
Payer: COMMERCIAL

## 2021-10-15 VITALS — BODY MASS INDEX: 55.36 KG/M2 | DIASTOLIC BLOOD PRESSURE: 72 MMHG | HEIGHT: 66 IN | SYSTOLIC BLOOD PRESSURE: 116 MMHG

## 2021-10-15 DIAGNOSIS — Z86.39 HX OF GRAVES' DISEASE: ICD-10-CM

## 2021-10-15 DIAGNOSIS — E55.9 VITAMIN D DEFICIENCY DISEASE: ICD-10-CM

## 2021-10-15 DIAGNOSIS — E05.00 GRAVES' ORBITOPATHY: ICD-10-CM

## 2021-10-15 DIAGNOSIS — E89.0 POSTABLATIVE HYPOTHYROIDISM: Primary | ICD-10-CM

## 2021-10-15 PROCEDURE — 3074F SYST BP LT 130 MM HG: CPT | Mod: CPTII,S$GLB,, | Performed by: INTERNAL MEDICINE

## 2021-10-15 PROCEDURE — 3078F PR MOST RECENT DIASTOLIC BLOOD PRESSURE < 80 MM HG: ICD-10-PCS | Mod: CPTII,S$GLB,, | Performed by: INTERNAL MEDICINE

## 2021-10-15 PROCEDURE — 3074F PR MOST RECENT SYSTOLIC BLOOD PRESSURE < 130 MM HG: ICD-10-PCS | Mod: CPTII,S$GLB,, | Performed by: INTERNAL MEDICINE

## 2021-10-15 PROCEDURE — 3008F PR BODY MASS INDEX (BMI) DOCUMENTED: ICD-10-PCS | Mod: CPTII,S$GLB,, | Performed by: INTERNAL MEDICINE

## 2021-10-15 PROCEDURE — 99999 PR PBB SHADOW E&M-EST. PATIENT-LVL III: ICD-10-PCS | Mod: PBBFAC,,, | Performed by: INTERNAL MEDICINE

## 2021-10-15 PROCEDURE — 1159F MED LIST DOCD IN RCRD: CPT | Mod: CPTII,S$GLB,, | Performed by: INTERNAL MEDICINE

## 2021-10-15 PROCEDURE — 99214 PR OFFICE/OUTPT VISIT, EST, LEVL IV, 30-39 MIN: ICD-10-PCS | Mod: S$GLB,,, | Performed by: INTERNAL MEDICINE

## 2021-10-15 PROCEDURE — 1160F PR REVIEW ALL MEDS BY PRESCRIBER/CLIN PHARMACIST DOCUMENTED: ICD-10-PCS | Mod: CPTII,S$GLB,, | Performed by: INTERNAL MEDICINE

## 2021-10-15 PROCEDURE — 3008F BODY MASS INDEX DOCD: CPT | Mod: CPTII,S$GLB,, | Performed by: INTERNAL MEDICINE

## 2021-10-15 PROCEDURE — 1159F PR MEDICATION LIST DOCUMENTED IN MEDICAL RECORD: ICD-10-PCS | Mod: CPTII,S$GLB,, | Performed by: INTERNAL MEDICINE

## 2021-10-15 PROCEDURE — 1160F RVW MEDS BY RX/DR IN RCRD: CPT | Mod: CPTII,S$GLB,, | Performed by: INTERNAL MEDICINE

## 2021-10-15 PROCEDURE — 3078F DIAST BP <80 MM HG: CPT | Mod: CPTII,S$GLB,, | Performed by: INTERNAL MEDICINE

## 2021-10-15 PROCEDURE — 99999 PR PBB SHADOW E&M-EST. PATIENT-LVL III: CPT | Mod: PBBFAC,,, | Performed by: INTERNAL MEDICINE

## 2021-10-15 PROCEDURE — 99214 OFFICE O/P EST MOD 30 MIN: CPT | Mod: S$GLB,,, | Performed by: INTERNAL MEDICINE

## 2021-10-26 ENCOUNTER — TELEPHONE (OUTPATIENT)
Dept: ENDOCRINOLOGY | Facility: CLINIC | Age: 56
End: 2021-10-26
Payer: COMMERCIAL

## 2021-10-26 DIAGNOSIS — E89.0 POSTABLATIVE HYPOTHYROIDISM: Primary | ICD-10-CM

## 2021-10-26 RX ORDER — LEVOTHYROXINE SODIUM 100 UG/1
100 TABLET ORAL
Qty: 30 TABLET | Refills: 11 | Status: SHIPPED | OUTPATIENT
Start: 2021-10-26 | End: 2022-09-30

## 2021-11-30 ENCOUNTER — OFFICE VISIT (OUTPATIENT)
Dept: OPHTHALMOLOGY | Facility: CLINIC | Age: 56
End: 2021-11-30
Payer: COMMERCIAL

## 2021-11-30 DIAGNOSIS — E05.00 GRAVES' ORBITOPATHY: Primary | ICD-10-CM

## 2021-11-30 DIAGNOSIS — G61.81 CIDP (CHRONIC INFLAMMATORY DEMYELINATING POLYNEUROPATHY): ICD-10-CM

## 2021-11-30 DIAGNOSIS — H02.534 EYELID RETRACTION LEFT UPPER EYELID: ICD-10-CM

## 2021-11-30 DIAGNOSIS — H53.413 CENTRAL SCOTOMA, BOTH EYES: ICD-10-CM

## 2021-11-30 DIAGNOSIS — H47.20 OPTIC ATROPHY, BOTH EYES: ICD-10-CM

## 2021-11-30 PROCEDURE — 92285 EXTERNAL PHOTOGRAPHY - OU - BOTH EYES: ICD-10-PCS | Mod: S$GLB,,, | Performed by: OPHTHALMOLOGY

## 2021-11-30 PROCEDURE — 99213 PR OFFICE/OUTPT VISIT, EST, LEVL III, 20-29 MIN: ICD-10-PCS | Mod: S$GLB,,, | Performed by: OPHTHALMOLOGY

## 2021-11-30 PROCEDURE — 99999 PR PBB SHADOW E&M-EST. PATIENT-LVL III: ICD-10-PCS | Mod: PBBFAC,,, | Performed by: OPHTHALMOLOGY

## 2021-11-30 PROCEDURE — 92285 EXTERNAL OCULAR PHOTOGRAPHY: CPT | Mod: S$GLB,,, | Performed by: OPHTHALMOLOGY

## 2021-11-30 PROCEDURE — 99999 PR PBB SHADOW E&M-EST. PATIENT-LVL III: CPT | Mod: PBBFAC,,, | Performed by: OPHTHALMOLOGY

## 2021-11-30 PROCEDURE — 99213 OFFICE O/P EST LOW 20 MIN: CPT | Mod: S$GLB,,, | Performed by: OPHTHALMOLOGY

## 2021-12-01 ENCOUNTER — TELEPHONE (OUTPATIENT)
Dept: OPHTHALMOLOGY | Facility: CLINIC | Age: 56
End: 2021-12-01
Payer: COMMERCIAL

## 2021-12-10 ENCOUNTER — OFFICE VISIT (OUTPATIENT)
Dept: URGENT CARE | Facility: CLINIC | Age: 56
End: 2021-12-10
Payer: COMMERCIAL

## 2021-12-10 VITALS
RESPIRATION RATE: 18 BRPM | OXYGEN SATURATION: 97 % | SYSTOLIC BLOOD PRESSURE: 134 MMHG | HEART RATE: 103 BPM | WEIGHT: 293 LBS | BODY MASS INDEX: 47.09 KG/M2 | HEIGHT: 66 IN | TEMPERATURE: 99 F | DIASTOLIC BLOOD PRESSURE: 75 MMHG

## 2021-12-10 DIAGNOSIS — B96.89 ACUTE BACTERIAL BRONCHITIS: Primary | ICD-10-CM

## 2021-12-10 DIAGNOSIS — J20.8 ACUTE BACTERIAL BRONCHITIS: Primary | ICD-10-CM

## 2021-12-10 DIAGNOSIS — R05.8 COUGH WITH CONGESTION OF PARANASAL SINUS: ICD-10-CM

## 2021-12-10 DIAGNOSIS — G61.81 CIDP (CHRONIC INFLAMMATORY DEMYELINATING POLYNEUROPATHY): ICD-10-CM

## 2021-12-10 DIAGNOSIS — R09.81 COUGH WITH CONGESTION OF PARANASAL SINUS: ICD-10-CM

## 2021-12-10 DIAGNOSIS — Z20.822 ENCOUNTER FOR LABORATORY TESTING FOR COVID-19 VIRUS: ICD-10-CM

## 2021-12-10 DIAGNOSIS — D84.9 IMMUNOCOMPROMISED PATIENT: ICD-10-CM

## 2021-12-10 LAB
CTP QC/QA: YES
SARS-COV-2 RDRP RESP QL NAA+PROBE: NEGATIVE

## 2021-12-10 PROCEDURE — 99214 PR OFFICE/OUTPT VISIT, EST, LEVL IV, 30-39 MIN: ICD-10-PCS | Mod: S$GLB,,, | Performed by: PHYSICIAN ASSISTANT

## 2021-12-10 PROCEDURE — U0002: ICD-10-PCS | Mod: QW,S$GLB,, | Performed by: PHYSICIAN ASSISTANT

## 2021-12-10 PROCEDURE — 99214 OFFICE O/P EST MOD 30 MIN: CPT | Mod: S$GLB,,, | Performed by: PHYSICIAN ASSISTANT

## 2021-12-10 PROCEDURE — U0002 COVID-19 LAB TEST NON-CDC: HCPCS | Mod: QW,S$GLB,, | Performed by: PHYSICIAN ASSISTANT

## 2021-12-10 RX ORDER — PROMETHAZINE HYDROCHLORIDE AND DEXTROMETHORPHAN HYDROBROMIDE 6.25; 15 MG/5ML; MG/5ML
5 SYRUP ORAL NIGHTLY PRN
Qty: 180 ML | Refills: 0 | Status: SHIPPED | OUTPATIENT
Start: 2021-12-10 | End: 2021-12-20

## 2021-12-10 RX ORDER — BENZONATATE 200 MG/1
200 CAPSULE ORAL 3 TIMES DAILY PRN
Qty: 30 CAPSULE | Refills: 0 | Status: SHIPPED | OUTPATIENT
Start: 2021-12-10 | End: 2021-12-20

## 2021-12-10 RX ORDER — AZITHROMYCIN 250 MG/1
TABLET, FILM COATED ORAL
Qty: 6 TABLET | Refills: 0 | Status: SHIPPED | OUTPATIENT
Start: 2021-12-10 | End: 2021-12-15

## 2021-12-10 RX ORDER — ALBUTEROL SULFATE 90 UG/1
2 AEROSOL, METERED RESPIRATORY (INHALATION) EVERY 6 HOURS PRN
Qty: 18 G | Refills: 0 | Status: SHIPPED | OUTPATIENT
Start: 2021-12-10 | End: 2023-06-09

## 2022-02-17 ENCOUNTER — TELEPHONE (OUTPATIENT)
Dept: OPTOMETRY | Facility: CLINIC | Age: 57
End: 2022-02-17
Payer: COMMERCIAL

## 2022-02-17 NOTE — TELEPHONE ENCOUNTER
----- Message from Riya Peñaloza sent at 2/17/2022  2:01 PM CST -----  Contact: Patient  Type:  Appointment Request      Name of Caller:Patient   Would the patient rather a call back or a response via [facility]? Call back   Best Call Back Number:[phone]  Additional Information: Please assist

## 2022-04-25 ENCOUNTER — TELEPHONE (OUTPATIENT)
Dept: OPHTHALMOLOGY | Facility: CLINIC | Age: 57
End: 2022-04-25
Payer: COMMERCIAL

## 2022-04-25 ENCOUNTER — TELEPHONE (OUTPATIENT)
Dept: ENDOCRINOLOGY | Facility: CLINIC | Age: 57
End: 2022-04-25
Payer: COMMERCIAL

## 2022-04-25 DIAGNOSIS — Z86.39 HX OF GRAVES' DISEASE: ICD-10-CM

## 2022-04-25 DIAGNOSIS — E55.9 VITAMIN D DEFICIENCY DISEASE: ICD-10-CM

## 2022-04-25 DIAGNOSIS — G61.81 CIDP (CHRONIC INFLAMMATORY DEMYELINATING POLYNEUROPATHY): Primary | ICD-10-CM

## 2022-04-25 NOTE — TELEPHONE ENCOUNTER
----- Message from Paola Javier sent at 4/25/2022  2:24 PM CDT -----  Contact: Alina @[phone]  Pt needs a call back regarding her vision is changing. She is having double vision

## 2022-04-25 NOTE — TELEPHONE ENCOUNTER
Spoke to patient and she would like Dr Markham to order some labs to go to [facility]. She would like a CMP also added to the labs.

## 2022-04-25 NOTE — TELEPHONE ENCOUNTER
----- Message from Dyana Wang sent at 4/25/2022  2:39 PM CDT -----  Regarding: PT inquiry  Pt calling to speak with staff regarding blood work   [phone] (home)

## 2022-04-27 ENCOUNTER — PATIENT MESSAGE (OUTPATIENT)
Dept: ENDOCRINOLOGY | Facility: CLINIC | Age: 57
End: 2022-04-27
Payer: COMMERCIAL

## 2022-04-29 LAB
25(OH)D3 SERPL-MCNC: 77 NG/ML (ref 30–100)
ALBUMIN SERPL-MCNC: 4 G/DL (ref 3.6–5.1)
ALBUMIN/GLOB SERPL: 1.7 (CALC) (ref 1–2.5)
ALP SERPL-CCNC: 86 U/L (ref 37–153)
ALT SERPL-CCNC: 17 U/L (ref 6–29)
AST SERPL-CCNC: 20 U/L (ref 10–35)
BILIRUB SERPL-MCNC: 0.3 MG/DL (ref 0.2–1.2)
BUN SERPL-MCNC: 12 MG/DL (ref 7–25)
BUN/CREAT SERPL: ABNORMAL (CALC) (ref 6–22)
CALCIUM SERPL-MCNC: 9.5 MG/DL (ref 8.6–10.4)
CHLORIDE SERPL-SCNC: 107 MMOL/L (ref 98–110)
CO2 SERPL-SCNC: 28 MMOL/L (ref 20–32)
CREAT SERPL-MCNC: 0.51 MG/DL (ref 0.5–1.05)
GLOBULIN SER CALC-MCNC: 2.3 G/DL (CALC) (ref 1.9–3.7)
GLUCOSE SERPL-MCNC: 117 MG/DL (ref 65–99)
POTASSIUM SERPL-SCNC: 5.1 MMOL/L (ref 3.5–5.3)
PROT SERPL-MCNC: 6.3 G/DL (ref 6.1–8.1)
SODIUM SERPL-SCNC: 143 MMOL/L (ref 135–146)
T4 FREE SERPL-MCNC: 1.3 NG/DL (ref 0.8–1.8)
TSH SERPL-ACNC: 0.44 MIU/L (ref 0.4–4.5)

## 2022-05-10 ENCOUNTER — OFFICE VISIT (OUTPATIENT)
Dept: OPTOMETRY | Facility: CLINIC | Age: 57
End: 2022-05-10
Payer: COMMERCIAL

## 2022-05-10 DIAGNOSIS — H52.7 REFRACTIVE ERROR: ICD-10-CM

## 2022-05-10 DIAGNOSIS — H54.3 LOW VISION, BOTH EYES: Primary | ICD-10-CM

## 2022-05-10 PROCEDURE — 1160F PR REVIEW ALL MEDS BY PRESCRIBER/CLIN PHARMACIST DOCUMENTED: ICD-10-PCS | Mod: CPTII,S$GLB,, | Performed by: OPTOMETRIST

## 2022-05-10 PROCEDURE — 99999 PR PBB SHADOW E&M-EST. PATIENT-LVL III: CPT | Mod: PBBFAC,,, | Performed by: OPTOMETRIST

## 2022-05-10 PROCEDURE — 1159F MED LIST DOCD IN RCRD: CPT | Mod: CPTII,S$GLB,, | Performed by: OPTOMETRIST

## 2022-05-10 PROCEDURE — 92015 PR REFRACTION: ICD-10-PCS | Mod: S$GLB,,, | Performed by: OPTOMETRIST

## 2022-05-10 PROCEDURE — 1159F PR MEDICATION LIST DOCUMENTED IN MEDICAL RECORD: ICD-10-PCS | Mod: CPTII,S$GLB,, | Performed by: OPTOMETRIST

## 2022-05-10 PROCEDURE — 99999 PR PBB SHADOW E&M-EST. PATIENT-LVL III: ICD-10-PCS | Mod: PBBFAC,,, | Performed by: OPTOMETRIST

## 2022-05-10 PROCEDURE — 1160F RVW MEDS BY RX/DR IN RCRD: CPT | Mod: CPTII,S$GLB,, | Performed by: OPTOMETRIST

## 2022-05-10 PROCEDURE — 92015 DETERMINE REFRACTIVE STATE: CPT | Mod: S$GLB,,, | Performed by: OPTOMETRIST

## 2022-05-10 PROCEDURE — 99499 UNLISTED E&M SERVICE: CPT | Mod: S$GLB,,, | Performed by: OPTOMETRIST

## 2022-05-10 PROCEDURE — 99499 NO LOS: ICD-10-PCS | Mod: S$GLB,,, | Performed by: OPTOMETRIST

## 2022-05-10 NOTE — PROGRESS NOTES
HPI     55 Y/o female is here for refraction C/o low vision   Pt states she has been to the [facility] and they inform her that she may   need prism lens due to having double vision   Pt denies pain and discomfort   No f/f    Eye med: no gtt     Last edited by Arnol Bernal MA on 5/10/2022  1:43 PM. (History)            Assessment /Plan     For exam results, see Encounter Report.    Low vision, both eyes    Refractive error      1. Spectacle Rx given, recommended lined bifocal, has been to [facility], no help with prism, diplopia not constant, discussed different options for glasses. RTC 1 year refraction.

## 2022-05-31 ENCOUNTER — PATIENT MESSAGE (OUTPATIENT)
Dept: ADMINISTRATIVE | Facility: HOSPITAL | Age: 57
End: 2022-05-31
Payer: COMMERCIAL

## 2022-06-10 ENCOUNTER — PATIENT MESSAGE (OUTPATIENT)
Dept: ENDOCRINOLOGY | Facility: CLINIC | Age: 57
End: 2022-06-10
Payer: COMMERCIAL

## 2022-06-20 ENCOUNTER — PATIENT MESSAGE (OUTPATIENT)
Dept: ENDOCRINOLOGY | Facility: CLINIC | Age: 57
End: 2022-06-20
Payer: COMMERCIAL

## 2022-06-20 NOTE — TELEPHONE ENCOUNTER
Patient wants [facility] and give them the diagnostic code for her vit d lab due to being billed

## 2022-08-11 ENCOUNTER — TELEPHONE (OUTPATIENT)
Dept: ENDOCRINOLOGY | Facility: CLINIC | Age: 57
End: 2022-08-11
Payer: COMMERCIAL

## 2022-08-11 NOTE — TELEPHONE ENCOUNTER
----- Message from Thomas Anna sent at 8/11/2022 12:30 PM CDT -----  Contact: Irene Rousseau @ [phone]  Patient & Caller requesting a return phone call a letter of Medical Necessity for the ( vitamin D labs) , pls call and reference this # [phone] or fax to [employer] @ [phone] or sent to [employer] @ [phone] office contact # is [phone]

## 2023-03-10 ENCOUNTER — TELEPHONE (OUTPATIENT)
Dept: ENDOCRINOLOGY | Facility: CLINIC | Age: 58
End: 2023-03-10
Payer: COMMERCIAL

## 2023-03-10 ENCOUNTER — TELEPHONE (OUTPATIENT)
Dept: OPHTHALMOLOGY | Facility: CLINIC | Age: 58
End: 2023-03-10
Payer: COMMERCIAL

## 2023-03-10 DIAGNOSIS — E55.9 VITAMIN D DEFICIENCY DISEASE: ICD-10-CM

## 2023-03-10 DIAGNOSIS — Z86.39 HX OF GRAVES' DISEASE: ICD-10-CM

## 2023-03-10 DIAGNOSIS — E89.0 POSTABLATIVE HYPOTHYROIDISM: Primary | ICD-10-CM

## 2023-03-10 NOTE — TELEPHONE ENCOUNTER
----- Message from Jazz Wyman sent at 3/10/2023  1:25 PM CST -----  Consult/Advisory    Name Of Caller:Alina       Contact Preference:[phone]       Nature of call: Pt called regarding a appt she saw the doctor 2021. She stated she really need to be seen she has graves disease.

## 2023-03-10 NOTE — TELEPHONE ENCOUNTER
----- Message from Brittany Lee sent at 3/10/2023  1:04 PM CST -----  Regarding: appt access  Contact: [phone]  Alina Rendon calling regarding Appointment Access  (message) for #annual visit and she would like to be seen asap. Plz call

## 2023-05-26 DIAGNOSIS — Z86.39 HX OF GRAVES' DISEASE: ICD-10-CM

## 2023-05-26 DIAGNOSIS — E55.9 VITAMIN D DEFICIENCY DISEASE: ICD-10-CM

## 2023-05-26 DIAGNOSIS — E89.0 POSTABLATIVE HYPOTHYROIDISM: Primary | ICD-10-CM

## 2023-05-31 ENCOUNTER — TELEPHONE (OUTPATIENT)
Dept: ENDOCRINOLOGY | Facility: CLINIC | Age: 58
End: 2023-05-31
Payer: COMMERCIAL

## 2023-05-31 NOTE — TELEPHONE ENCOUNTER
Patient called to inquire about if her lab orders were sent to [facility]. I informed patient that Dr. Markham sent lab orders to [facility] on 5/26/2023.

## 2023-05-31 NOTE — TELEPHONE ENCOUNTER
----- Message from Bruce Hirsch MA sent at 5/31/2023 12:07 PM CDT -----  Contact: [phone]  Pt states this will be her 3rd time calling. She would like her for Lab Orders to be sent to:    [facility]  [address] [id number]  Phone:[phone]    Pt requesting a callback at [phone] once the orders are sent over.

## 2023-06-08 LAB
25(OH)D3+25(OH)D2 SERPL-MCNC: 59.1 NG/ML (ref 30–100)
ALBUMIN SERPL-MCNC: 4.1 G/DL (ref 3.8–4.9)
ALBUMIN/GLOB SERPL: 2 {RATIO} (ref 1.2–2.2)
ALP SERPL-CCNC: 84 IU/L (ref 44–121)
ALT SERPL-CCNC: 10 IU/L (ref 0–32)
AST SERPL-CCNC: 16 IU/L (ref 0–40)
BILIRUB SERPL-MCNC: <0.2 MG/DL (ref 0–1.2)
BUN SERPL-MCNC: 20 MG/DL (ref 6–24)
BUN/CREAT SERPL: 38 (ref 9–23)
CALCIUM SERPL-MCNC: 9 MG/DL (ref 8.7–10.2)
CHLORIDE SERPL-SCNC: 105 MMOL/L (ref 96–106)
CO2 SERPL-SCNC: 22 MMOL/L (ref 20–29)
CREAT SERPL-MCNC: 0.52 MG/DL (ref 0.57–1)
EST. GFR  (NO RACE VARIABLE): 108 ML/MIN/1.73
GLOBULIN SER CALC-MCNC: 2.1 G/DL (ref 1.5–4.5)
GLUCOSE SERPL-MCNC: 89 MG/DL (ref 70–99)
POTASSIUM SERPL-SCNC: 4.2 MMOL/L (ref 3.5–5.2)
PROT SERPL-MCNC: 6.2 G/DL (ref 6–8.5)
SODIUM SERPL-SCNC: 141 MMOL/L (ref 134–144)
T4 FREE SERPL-MCNC: 1.04 NG/DL (ref 0.82–1.77)
TSH SERPL DL<=0.005 MIU/L-ACNC: 1.76 UIU/ML (ref 0.45–4.5)

## 2023-06-09 ENCOUNTER — OFFICE VISIT (OUTPATIENT)
Dept: ENDOCRINOLOGY | Facility: CLINIC | Age: 58
End: 2023-06-09
Payer: COMMERCIAL

## 2023-06-09 DIAGNOSIS — E66.01 MORBID OBESITY: ICD-10-CM

## 2023-06-09 DIAGNOSIS — E05.00 GRAVES' ORBITOPATHY: ICD-10-CM

## 2023-06-09 DIAGNOSIS — Z86.39 HX OF GRAVES' DISEASE: ICD-10-CM

## 2023-06-09 DIAGNOSIS — E89.0 POSTABLATIVE HYPOTHYROIDISM: Primary | ICD-10-CM

## 2023-06-09 DIAGNOSIS — G61.81 CIDP (CHRONIC INFLAMMATORY DEMYELINATING POLYNEUROPATHY): ICD-10-CM

## 2023-06-09 DIAGNOSIS — E55.9 VITAMIN D DEFICIENCY DISEASE: ICD-10-CM

## 2023-06-09 PROCEDURE — 99214 OFFICE O/P EST MOD 30 MIN: CPT | Mod: 95,,, | Performed by: INTERNAL MEDICINE

## 2023-06-09 PROCEDURE — 99214 PR OFFICE/OUTPT VISIT, EST, LEVL IV, 30-39 MIN: ICD-10-PCS | Mod: 95,,, | Performed by: INTERNAL MEDICINE

## 2023-06-09 PROCEDURE — 1159F PR MEDICATION LIST DOCUMENTED IN MEDICAL RECORD: ICD-10-PCS | Mod: CPTII,95,, | Performed by: INTERNAL MEDICINE

## 2023-06-09 PROCEDURE — 1159F MED LIST DOCD IN RCRD: CPT | Mod: CPTII,95,, | Performed by: INTERNAL MEDICINE

## 2023-06-09 PROCEDURE — 1160F PR REVIEW ALL MEDS BY PRESCRIBER/CLIN PHARMACIST DOCUMENTED: ICD-10-PCS | Mod: CPTII,95,, | Performed by: INTERNAL MEDICINE

## 2023-06-09 PROCEDURE — 1160F RVW MEDS BY RX/DR IN RCRD: CPT | Mod: CPTII,95,, | Performed by: INTERNAL MEDICINE

## 2023-06-09 RX ORDER — LEVOTHYROXINE SODIUM 100 UG/1
100 TABLET ORAL
Qty: 30 TABLET | Refills: 11 | Status: SHIPPED | OUTPATIENT
Start: 2023-06-09

## 2023-06-09 NOTE — ASSESSMENT & PLAN NOTE
--patient with severe Graves orbitopathy with optic nerve involvement bilaterally  --status post bilateral orbital decompression in 02/2020  --Completed a full course of teprotumumab in 2021  --Previously following with Collette Holley

## 2023-06-09 NOTE — PROGRESS NOTES
Subjective:      Patient ID: Alina Rendon is a 57 y.o. female.    Chief Complaint:  Hypothyroidism    The patient location is: Home  The chief complaint leading to consultation is: Hypothyroidism    Visit type: audiovisual    Face to Face time with patient: 10 min  15 minutes of total time spent on the encounter, which includes face to face time and non-face to face time preparing to see the patient (eg, review of tests), Obtaining and/or reviewing separately obtained history, Documenting clinical information in the electronic or other health record, Independently interpreting results (not separately reported) and communicating results to the patient/family/caregiver, or Care coordination (not separately reported).     Each patient to whom he or she provides medical services by telemedicine is:  (1) informed of the relationship between the physician and patient and the respective role of any other health care provider with respect to management of the patient; and (2) notified that he or she may decline to receive medical services by telemedicine and may     History of Present Illness  Ms. TAMMY Rendon presents for follow up of hypothyroidism. Last seen 10/2021.      With hypothyroidism s/p I-131 tx on 1999 for Graves' disease and is taking Synthroid 100 mcg once daily.  Takes thyroid hormone on empty stomach and separate from other meds. No missed doses.      Denies palpitations. Has chronic tremor related to CIDP. Has chronic fatigue that is at baseline.      Latest Reference Range & Units 06/07/23 10:49   TSH 0.450 - 4.500 uIU/mL 1.760       Has Graves' orbitopathy and underwent bilateral orbital decompression in 2/2020. She was seen by Dr. James prior to our last visit who feels the optic nerve injury may be permanent. She then saw Dr. Hough and just completed a full course of Tepezza in 2021.     For vitamin D deficiency she is taking vitamin D 1000 daily. No hx of fractures.     Has CIDP/ autoimmune neuropathy  that is being treated with plasmapheresis 3 times per month. No recent steroid therapy.    Review of Systems   Constitutional:  Negative for chills and fever.   Gastrointestinal:  Negative for nausea.     Objective:   Physical Exam  Constitutional:       General: She is not in acute distress.     Appearance: Normal appearance. She is not ill-appearing.   Neurological:      Mental Status: She is alert.     BP Readings from Last 3 Encounters:   12/10/21 134/75   10/15/21 116/72   06/12/20 124/78     Wt Readings from Last 1 Encounters:   12/10/21 1637 (!) 155.6 kg (343 lb)       There is no height or weight on file to calculate BMI.    Lab Review:   No results found for: HGBA1C  No results found for: CHOL, HDL, LDLCALC, TRIG, CHOLHDL  Lab Results   Component Value Date     06/07/2023    K 4.2 06/07/2023     06/07/2023    CO2 22 06/07/2023    GLU 89 06/07/2023    BUN 20 06/07/2023    CREATININE 0.52 (L) 06/07/2023    CALCIUM 9.0 06/07/2023    PROT 6.3 04/28/2022    ALBUMIN 4.1 06/07/2023    BILITOT <0.2 06/07/2023    ALKPHOS 50 01/30/2018    AST 16 06/07/2023    ALT 10 06/07/2023    ANIONGAP 12 02/12/2020    ESTGFRAFRICA 124 04/28/2022    EGFRNONAA 107 04/28/2022    TSH 1.760 06/07/2023         Assessment and Plan     Postablative hypothyroidism  --Patient  euthyroid on the current dose of thyroid hormone  --TSH at goal  --Continue Synthroid 100 mcg once daily    Hx of Graves' disease  --S/p ROJAS ablation in 1999    Graves' orbitopathy  --patient with severe Graves orbitopathy with optic nerve involvement bilaterally  --status post bilateral orbital decompression in 02/2020  --Completed a full course of teprotumumab in 2021  --Previously following with Collette Hough and E[facility]      Follow up in one year with labs at [facility] prior to appt (TSH, FT4, vitamin D)      Marty Markham M.D. Staff Endocrinology

## 2023-06-09 NOTE — ASSESSMENT & PLAN NOTE
--Patient  euthyroid on the current dose of thyroid hormone  --TSH at goal  --Continue Synthroid 100 mcg once daily

## 2023-10-05 ENCOUNTER — OFFICE VISIT (OUTPATIENT)
Dept: OPHTHALMOLOGY | Facility: CLINIC | Age: 58
End: 2023-10-05
Payer: COMMERCIAL

## 2023-10-05 DIAGNOSIS — H25.11 AGE-RELATED NUCLEAR CATARACT, RIGHT: ICD-10-CM

## 2023-10-05 DIAGNOSIS — H43.812 POSTERIOR VITREOUS DETACHMENT, LEFT: Primary | ICD-10-CM

## 2023-10-05 DIAGNOSIS — E05.00 GRAVES' ORBITOPATHY: ICD-10-CM

## 2023-10-05 DIAGNOSIS — Z96.1 PSEUDOPHAKIA, LEFT EYE: ICD-10-CM

## 2023-10-05 DIAGNOSIS — H43.821 VITREOMACULAR ADHESION, RIGHT: ICD-10-CM

## 2023-10-05 DIAGNOSIS — H47.20 OPTIC ATROPHY, BOTH EYES: ICD-10-CM

## 2023-10-05 PROCEDURE — 92134 OCT, RETINA - OU - BOTH EYES: ICD-10-PCS | Mod: S$GLB,,, | Performed by: OPHTHALMOLOGY

## 2023-10-05 PROCEDURE — 92201 PR OPHTHALMOSCOPY, EXT, W/RET DRAW/SCLERAL DEPR, I&R, UNI/BI: ICD-10-PCS | Mod: S$GLB,,, | Performed by: OPHTHALMOLOGY

## 2023-10-05 PROCEDURE — 92201 OPSCPY EXTND RTA DRAW UNI/BI: CPT | Mod: S$GLB,,, | Performed by: OPHTHALMOLOGY

## 2023-10-05 PROCEDURE — 1160F RVW MEDS BY RX/DR IN RCRD: CPT | Mod: CPTII,S$GLB,, | Performed by: OPHTHALMOLOGY

## 2023-10-05 PROCEDURE — 99999 PR PBB SHADOW E&M-EST. PATIENT-LVL III: ICD-10-PCS | Mod: PBBFAC,,, | Performed by: OPHTHALMOLOGY

## 2023-10-05 PROCEDURE — 99214 OFFICE O/P EST MOD 30 MIN: CPT | Mod: S$GLB,,, | Performed by: OPHTHALMOLOGY

## 2023-10-05 PROCEDURE — 92134 CPTRZ OPH DX IMG PST SGM RTA: CPT | Mod: S$GLB,,, | Performed by: OPHTHALMOLOGY

## 2023-10-05 PROCEDURE — 1159F PR MEDICATION LIST DOCUMENTED IN MEDICAL RECORD: ICD-10-PCS | Mod: CPTII,S$GLB,, | Performed by: OPHTHALMOLOGY

## 2023-10-05 PROCEDURE — 99214 PR OFFICE/OUTPT VISIT, EST, LEVL IV, 30-39 MIN: ICD-10-PCS | Mod: S$GLB,,, | Performed by: OPHTHALMOLOGY

## 2023-10-05 PROCEDURE — 1159F MED LIST DOCD IN RCRD: CPT | Mod: CPTII,S$GLB,, | Performed by: OPHTHALMOLOGY

## 2023-10-05 PROCEDURE — 99999 PR PBB SHADOW E&M-EST. PATIENT-LVL III: CPT | Mod: PBBFAC,,, | Performed by: OPHTHALMOLOGY

## 2023-10-05 PROCEDURE — 1160F PR REVIEW ALL MEDS BY PRESCRIBER/CLIN PHARMACIST DOCUMENTED: ICD-10-PCS | Mod: CPTII,S$GLB,, | Performed by: OPHTHALMOLOGY

## 2023-10-05 NOTE — PROGRESS NOTES
HPI    56 Y/o female is for a retinal eval   Pt has his of graves disease   +diplopia OS  +flashes OS   +floaters OS     Pt states she has been to the [facility] and they inform her that she may   need prism lens due to having double vision   Pt denies pain and discomfort   No f/f    Eye med: no gtt   Last edited by Ayleen Montez on 10/5/2023  1:46 PM.         A/P    ICD-10-CM ICD-9-CM   1. Posterior vitreous detachment, left  H43.812 379.21   2. Vitreomacular adhesion, right  H43.821 379.27   3. Graves' orbitopathy  E05.00 242.00     376.21   4. Optic atrophy, both eyes  H47.20 377.10   5. Pseudophakia, left eye  Z96.1 V43.1   6. Age-related nuclear cataract, right  H25.11 366.16       1. Posterior vitreous detachment, left  Here for new floater exam  Noted new symptoms recently    Today has PVD OS, VMA OD, no RT/RD with   Plan: Observation, counseled on RT/RD development and monitoring  Pathology of PVD, Retinal Tear, Retinal Detachment reviewed in great detail  RD precautions discussed in detail, patient expressed understanding  RTC immediately PRN (especially ANY change flashes, floaters, vision, visual field)     2. Vitreomacular adhesion, right  Mild VMA, no RT/RD  Plan: Observation     3. Graves' orbitopathy  4. Optic atrophy, both eyes  Prev f/b Dr. Hough and outside oculoplastics doctors - Dr. Hough's last note from 2021 reviewed  Pt with significant vision dysfunction due to Graves dz  Has had decompression in past  F/b Dr. Markham for endo- notes reviewed   Plan: will refer for lid retraction eval, may benefit from additional surgery if recommended by Oculoplastics team    5. Pseudophakia, left eye  Good lens position  Plan: Observation     6. Age-related nuclear cataract, right  Mod NS, borderline VS  Plan: Observation for now, update Mrx prn    RTC Faviola 1 mo DFE/OCTm OU  RTC Oculoplastics for Graves f/u        I saw and examined the patient and reviewed in detail the findings documented. The final  examination findings, image interpretations which have been independently interpreted, and plan as documented in the record represent my personal judgment and conclusions.    Neri Salmeron MD  Vitreoretinal Surgery   [facility]

## 2023-10-13 ENCOUNTER — TELEPHONE (OUTPATIENT)
Dept: ENDOCRINOLOGY | Facility: CLINIC | Age: 58
End: 2023-10-13
Payer: COMMERCIAL

## 2023-10-13 NOTE — TELEPHONE ENCOUNTER
Spoke with patient scheduled visit with Dr Sevilla. Patient approved time and date                    ----- Message from Kathleen Kaiser sent at 10/13/2023  1:32 PM CDT -----  Regarding: Consult/Advisory  Contact: [phone]  CONSULT/ADVISORY    Name of Caller:  GRADY WHITFIELD [[id number]]    Contact Preference:  [phone]    Nature of Call:  Pt states she saw Dr Salmeron in Ophthalmology last week and was told to see a Dr that specializes in Graves Disease.  Pt's eyesight is changing and would like to be seen as soon as possible.  Please call.

## 2023-10-27 ENCOUNTER — TELEPHONE (OUTPATIENT)
Dept: ENDOCRINOLOGY | Facility: CLINIC | Age: 58
End: 2023-10-27
Payer: COMMERCIAL

## 2023-10-27 NOTE — TELEPHONE ENCOUNTER
So she should keep visit with you? Should she call dr doshi's office to get a referral to someone in oculoplastics?

## 2023-10-27 NOTE — TELEPHONE ENCOUNTER
----- Message from Ana Charles sent at 10/27/2023  1:09 PM CDT -----  Regarding: Sooner appt  Contact: [phone]  Pt requesting a call back to see if there is a sooner appt before 11/24 as Dr. Salmeron states she needs to a Graves specialist (eyesight issues). Please call to discuss further.

## 2023-10-27 NOTE — TELEPHONE ENCOUNTER
Hey - this is a pt that dr yap usually sees. She had an appt with him in June 2023 so pretty recently.. the most recent chart notes state RTC in 1 year. Pt is scheduled with you next month because of graves orbitopathy but is that something you would even do anything for besides refer her to the proper eye specialist? Pt saw dr doshi recently also and those notes state pt should f/u with oculoplastics - just want to make sure pt having an appt with you at this time is even appropriate before I call pt back to let her know there isn't anything sooner. If pt doesn't need to keep appt with you, can we put in order for referral to the correct eye specialist? Or does dr yap need to do that since it's his patient?

## 2023-11-13 ENCOUNTER — TELEPHONE (OUTPATIENT)
Dept: ENDOCRINOLOGY | Facility: CLINIC | Age: 58
End: 2023-11-13
Payer: COMMERCIAL

## 2023-11-13 DIAGNOSIS — E89.0 POSTABLATIVE HYPOTHYROIDISM: Primary | ICD-10-CM

## 2023-11-13 NOTE — TELEPHONE ENCOUNTER
Spoke with pt. Informed her to get a blood work done before visit. Pt asking for the order to be sent to [facility]. Tsh order re-entered for [facility].

## 2023-11-21 ENCOUNTER — OFFICE VISIT (OUTPATIENT)
Dept: URGENT CARE | Facility: CLINIC | Age: 58
End: 2023-11-21
Payer: COMMERCIAL

## 2023-11-21 VITALS
SYSTOLIC BLOOD PRESSURE: 121 MMHG | WEIGHT: 160 LBS | BODY MASS INDEX: 25.71 KG/M2 | TEMPERATURE: 99 F | OXYGEN SATURATION: 95 % | HEART RATE: 94 BPM | HEIGHT: 66 IN | DIASTOLIC BLOOD PRESSURE: 76 MMHG

## 2023-11-21 DIAGNOSIS — R06.2 WHEEZING: ICD-10-CM

## 2023-11-21 DIAGNOSIS — R05.9 COUGH, UNSPECIFIED TYPE: Primary | ICD-10-CM

## 2023-11-21 DIAGNOSIS — R09.3 ABNORMAL SPUTUM COLOR: ICD-10-CM

## 2023-11-21 LAB
CTP QC/QA: YES
POC MOLECULAR INFLUENZA A AGN: NEGATIVE
POC MOLECULAR INFLUENZA B AGN: NEGATIVE

## 2023-11-21 PROCEDURE — 99213 OFFICE O/P EST LOW 20 MIN: CPT | Mod: S$GLB,,, | Performed by: NURSE PRACTITIONER

## 2023-11-21 PROCEDURE — 87502 POCT INFLUENZA A/B MOLECULAR: ICD-10-PCS | Mod: QW,S$GLB,, | Performed by: NURSE PRACTITIONER

## 2023-11-21 PROCEDURE — 99213 PR OFFICE/OUTPT VISIT, EST, LEVL III, 20-29 MIN: ICD-10-PCS | Mod: S$GLB,,, | Performed by: NURSE PRACTITIONER

## 2023-11-21 PROCEDURE — 87502 INFLUENZA DNA AMP PROBE: CPT | Mod: QW,S$GLB,, | Performed by: NURSE PRACTITIONER

## 2023-11-21 RX ORDER — FLUTICASONE PROPIONATE 50 MCG
1 SPRAY, SUSPENSION (ML) NASAL 2 TIMES DAILY
Qty: 9.9 ML | Refills: 0 | Status: SHIPPED | OUTPATIENT
Start: 2023-11-21 | End: 2024-02-15

## 2023-11-21 RX ORDER — AZITHROMYCIN 250 MG/1
TABLET, FILM COATED ORAL
Qty: 6 TABLET | Refills: 0 | Status: SHIPPED | OUTPATIENT
Start: 2023-11-21 | End: 2023-11-26

## 2023-11-21 RX ORDER — CETIRIZINE HYDROCHLORIDE 10 MG/1
10 TABLET ORAL DAILY
Qty: 30 TABLET | Refills: 0 | Status: SHIPPED | OUTPATIENT
Start: 2023-11-21 | End: 2024-02-15

## 2023-11-21 RX ORDER — ALBUTEROL SULFATE 90 UG/1
2 AEROSOL, METERED RESPIRATORY (INHALATION) EVERY 6 HOURS PRN
Qty: 18 G | Refills: 0 | Status: SHIPPED | OUTPATIENT
Start: 2023-11-21 | End: 2024-02-15

## 2023-11-21 RX ORDER — GUAIFENESIN 600 MG/1
1200 TABLET, EXTENDED RELEASE ORAL 2 TIMES DAILY
Qty: 40 TABLET | Refills: 0 | Status: SHIPPED | OUTPATIENT
Start: 2023-11-21 | End: 2023-12-01

## 2023-11-21 RX ORDER — PREDNISONE 20 MG/1
20 TABLET ORAL DAILY
Qty: 5 TABLET | Refills: 0 | Status: SHIPPED | OUTPATIENT
Start: 2023-11-21 | End: 2023-11-26

## 2023-11-21 RX ORDER — PROMETHAZINE HYDROCHLORIDE AND DEXTROMETHORPHAN HYDROBROMIDE 6.25; 15 MG/5ML; MG/5ML
5 SYRUP ORAL NIGHTLY PRN
Qty: 118 ML | Refills: 0 | Status: SHIPPED | OUTPATIENT
Start: 2023-11-21 | End: 2024-02-15

## 2023-11-21 NOTE — PROGRESS NOTES
"Subjective:      Patient ID: Alina Rendon is a 58 y.o. female.    Vitals:  height is 5' 6" (1.676 m) and weight is 72.6 kg (160 lb). Her oral temperature is 98.7 °F (37.1 °C). Her blood pressure is 121/76 and her pulse is 94. Her oxygen saturation is 95%.     Chief Complaint: Cough    58-year-old female with medical history as listed below presents to clinic for evaluation of productive cough, congestion, fatigue, diarrhea x6 days.  Patient reports a negative home COVID test.  She reports taking over-the-counter cough medications with minimal relief.  She denies fever.  She is awake and alert, answers questions appropriately, no acute distress noted on today's visit.    Past Medical History:  No date: Cataract  2012: CIDP (chronic inflammatory demyelinating polyneuropathy)  No date: Grave's disease      Comment:  s/p I-131 tx and Orbitopathy  No date: Hypothyroidism      Comment:  s/p I-1 31 tx  No date: Neuropathy      Comment:  Autoimmune etiology ( CIDP)  9/3/2019: Screening for colorectal cancer      Comment:   normal 6/18, 10 years  No date: Spondylolisthesis  No date: Vitamin D deficiency      Cough  This is a new problem. Episode onset: 6 days ago. The problem has been gradually worsening. Associated symptoms include nasal congestion and sweats. Pertinent negatives include no chest pain, chills, fever or shortness of breath. Associated symptoms comments: Body aches, chest congestion, diarrhea. The symptoms are aggravated by lying down. Treatments tried: robitussin. The treatment provided no relief. There is no history of asthma, bronchiectasis, bronchitis, COPD, emphysema, environmental allergies or pneumonia.       Constitution: Negative for activity change, appetite change, chills, sweating, fatigue and fever.   HENT:  Positive for congestion.    Cardiovascular:  Negative for chest pain.   Respiratory:  Positive for cough and sputum production. Negative for shortness of breath.    Allergic/Immunologic: " Negative for environmental allergies.   Neurological:  Negative for dizziness.      Objective:     Physical Exam   Constitutional: She is oriented to person, place, and time. She appears well-developed.  Non-toxic appearance. She does not appear ill. No distress.   HENT:   Head: Normocephalic and atraumatic. Head is without abrasion, without contusion and without laceration.   Ears:   Right Ear: Tympanic membrane and external ear normal.   Left Ear: Tympanic membrane and external ear normal.   Nose: Congestion present.   Mouth/Throat: Mucous membranes are normal. Mucous membranes are moist. Posterior oropharyngeal erythema present. No oropharyngeal exudate. Oropharynx is clear.   Eyes: Conjunctivae, EOM and lids are normal.   Neck: Trachea normal and phonation normal.   Cardiovascular: Normal rate and normal heart sounds.   Pulmonary/Chest: Effort normal. No stridor. No respiratory distress. She has wheezes (Faint expiratory wheeze noted).   Abdominal: Normal appearance.   Musculoskeletal: Normal range of motion.         General: Normal range of motion.   Neurological: She is alert and oriented to person, place, and time.   Skin: Skin is warm, dry, intact, not diaphoretic and not pale. No abrasion, No burn and No ecchymosis   Psychiatric: Her speech is normal and behavior is normal. Mood, judgment and thought content normal.   Nursing note and vitals reviewed.    Results for orders placed or performed in visit on 11/21/23   POCT Influenza A/B MOLECULAR   Result Value Ref Range    POC Molecular Influenza A Ag Negative Negative, Not Reported    POC Molecular Influenza B Ag Negative Negative, Not Reported     Acceptable Yes          Assessment:     1. Cough, unspecified type    2. Abnormal sputum color    3. Wheezing        Plan:       Cough, unspecified type  -     POCT Influenza A/B MOLECULAR  -     fluticasone propionate (FLONASE) 50 mcg/actuation nasal spray; 1 spray (50 mcg total) by Each Nostril  route 2 (two) times daily.  Dispense: 9.9 mL; Refill: 0  -     cetirizine (ZYRTEC) 10 MG tablet; Take 1 tablet (10 mg total) by mouth once daily.  Dispense: 30 tablet; Refill: 0  -     guaiFENesin (MUCINEX) 600 mg 12 hr tablet; Take 2 tablets (1,200 mg total) by mouth 2 (two) times daily. for 10 days  Dispense: 40 tablet; Refill: 0  -     promethazine-dextromethorphan (PROMETHAZINE-DM) 6.25-15 mg/5 mL Syrp; Take 5 mLs by mouth nightly as needed (cough).  Dispense: 118 mL; Refill: 0    Abnormal sputum color  -     azithromycin (Z-GAMALIEL) 250 MG tablet; Take 2 tablets by mouth on day 1; Take 1 tablet by mouth on days 2-5  Dispense: 6 tablet; Refill: 0    Wheezing  -     predniSONE (DELTASONE) 20 MG tablet; Take 1 tablet (20 mg total) by mouth once daily. for 5 days  Dispense: 5 tablet; Refill: 0  -     albuterol (VENTOLIN HFA) 90 mcg/actuation inhaler; Inhale 2 puffs into the lungs every 6 (six) hours as needed for Wheezing. Rescue  Dispense: 18 g; Refill: 0      - flu negative on today's visit.  Suspect acute bronchitis, however given patient's history, and abnormal sputum production, will cover for bacterial etiology.  Follow-up with PCP.  Patient verbalized understanding and is in agreement with plan.    Patient Instructions   - Follow up with your PCP or specialty clinic as directed in the next 1-2 weeks if not improved or as needed.  You can call [phone] to schedule an appointment with the appropriate provider.    - Go to the ER or seek medical attention immediately if you develop new or worsening symptoms.    - You must understand that you have received an Urgent Care treatment only and that you may be released before all of your medical problems are known or treated.   - You, the patient, will arrange for follow up care as instructed.   - If your condition worsens or fails to improve we recommend that you receive another evaluation at the ER immediately or contact your PCP to discuss your concerns or return  here.

## 2023-11-21 NOTE — PATIENT INSTRUCTIONS
- Follow up with your PCP or specialty clinic as directed in the next 1-2 weeks if not improved or as needed.  You can call [phone] to schedule an appointment with the appropriate provider.    - Go to the ER or seek medical attention immediately if you develop new or worsening symptoms.    - You must understand that you have received an Urgent Care treatment only and that you may be released before all of your medical problems are known or treated.   - You, the patient, will arrange for follow up care as instructed.   - If your condition worsens or fails to improve we recommend that you receive another evaluation at the ER immediately or contact your PCP to discuss your concerns or return here.

## 2023-11-24 ENCOUNTER — OFFICE VISIT (OUTPATIENT)
Dept: ENDOCRINOLOGY | Facility: CLINIC | Age: 58
End: 2023-11-24
Payer: COMMERCIAL

## 2023-11-24 VITALS
HEIGHT: 66 IN | DIASTOLIC BLOOD PRESSURE: 82 MMHG | SYSTOLIC BLOOD PRESSURE: 138 MMHG | BODY MASS INDEX: 24.54 KG/M2 | WEIGHT: 152.69 LBS

## 2023-11-24 DIAGNOSIS — E89.0 POSTABLATIVE HYPOTHYROIDISM: Primary | ICD-10-CM

## 2023-11-24 DIAGNOSIS — E05.00 GRAVES' ORBITOPATHY: ICD-10-CM

## 2023-11-24 PROCEDURE — 99214 OFFICE O/P EST MOD 30 MIN: CPT | Mod: S$GLB,,, | Performed by: INTERNAL MEDICINE

## 2023-11-24 PROCEDURE — 99999 PR PBB SHADOW E&M-EST. PATIENT-LVL IV: CPT | Mod: PBBFAC,,, | Performed by: INTERNAL MEDICINE

## 2023-11-24 PROCEDURE — 3079F DIAST BP 80-89 MM HG: CPT | Mod: CPTII,S$GLB,, | Performed by: INTERNAL MEDICINE

## 2023-11-24 PROCEDURE — 1160F RVW MEDS BY RX/DR IN RCRD: CPT | Mod: CPTII,S$GLB,, | Performed by: INTERNAL MEDICINE

## 2023-11-24 PROCEDURE — 1159F PR MEDICATION LIST DOCUMENTED IN MEDICAL RECORD: ICD-10-PCS | Mod: CPTII,S$GLB,, | Performed by: INTERNAL MEDICINE

## 2023-11-24 PROCEDURE — 3008F PR BODY MASS INDEX (BMI) DOCUMENTED: ICD-10-PCS | Mod: CPTII,S$GLB,, | Performed by: INTERNAL MEDICINE

## 2023-11-24 PROCEDURE — 1160F PR REVIEW ALL MEDS BY PRESCRIBER/CLIN PHARMACIST DOCUMENTED: ICD-10-PCS | Mod: CPTII,S$GLB,, | Performed by: INTERNAL MEDICINE

## 2023-11-24 PROCEDURE — 3075F PR MOST RECENT SYSTOLIC BLOOD PRESS GE 130-139MM HG: ICD-10-PCS | Mod: CPTII,S$GLB,, | Performed by: INTERNAL MEDICINE

## 2023-11-24 PROCEDURE — 99999 PR PBB SHADOW E&M-EST. PATIENT-LVL IV: ICD-10-PCS | Mod: PBBFAC,,, | Performed by: INTERNAL MEDICINE

## 2023-11-24 PROCEDURE — 99214 PR OFFICE/OUTPT VISIT, EST, LEVL IV, 30-39 MIN: ICD-10-PCS | Mod: S$GLB,,, | Performed by: INTERNAL MEDICINE

## 2023-11-24 PROCEDURE — 3079F PR MOST RECENT DIASTOLIC BLOOD PRESSURE 80-89 MM HG: ICD-10-PCS | Mod: CPTII,S$GLB,, | Performed by: INTERNAL MEDICINE

## 2023-11-24 PROCEDURE — 3008F BODY MASS INDEX DOCD: CPT | Mod: CPTII,S$GLB,, | Performed by: INTERNAL MEDICINE

## 2023-11-24 PROCEDURE — 1159F MED LIST DOCD IN RCRD: CPT | Mod: CPTII,S$GLB,, | Performed by: INTERNAL MEDICINE

## 2023-11-24 PROCEDURE — 3075F SYST BP GE 130 - 139MM HG: CPT | Mod: CPTII,S$GLB,, | Performed by: INTERNAL MEDICINE

## 2023-11-24 NOTE — PROGRESS NOTES
Subjective:      Patient ID: Alina Rendon is a 58 y.o. female.    Chief Complaint:  Hypothyroidism    History of Present Illness  Ms. TAMMY Rendon presents for follow up of hypothyroidism. Last seen by Dr. Markham in 6/2023, new to me    Since last visit seen by Dr. Salmeron for Graves' eye disease and referred back to oculoplastics although no appointment for this was made. She was told to see a Graves' specialist and presents here today but I think intention was for surgeon  Also due for visit again with Dr. Salmeron       Has Graves' orbitopathy and underwent bilateral orbital decompression in 2/2020. She was seen by Dr. James who feels the optic nerve injury may be permanent. She then saw Dr. Hough and just completed a full course of Tepezza in 2021.       With hypothyroidism s/p I-131 tx on 1999 for Graves' disease and is taking Synthroid 100 mcg once daily.  Takes thyroid hormone on empty stomach and separate from other meds. No missed doses.      Denies palpitations. Has chronic tremor related to CIDP. Has chronic fatigue that is at baseline.      Latest Reference Range & Units 06/07/23 10:49   TSH 0.450 - 4.500 uIU/mL 1.760       For vitamin D deficiency she is taking vitamin D 1000 daily. No hx of fractures.     Has CIDP/ autoimmune neuropathy that is being treated with plasmapheresis 3 times per month. No recent steroid therapy.    Review of Systems   Constitutional:  Negative for chills and fever.   Gastrointestinal:  Negative for nausea.   Denies weight change, heat/cold intolerance, palpitations, tremors, change in bowel movements    Objective:   Physical Exam  Constitutional:       General: She is not in acute distress.     Appearance: Normal appearance. She is not ill-appearing.   Neurological:      Mental Status: She is alert.       BP Readings from Last 3 Encounters:   11/24/23 138/82   11/21/23 121/76   12/10/21 134/75     Wt Readings from Last 1 Encounters:   11/24/23 1056 69.3 kg (152 lb 10.7 oz)  "      Body mass index is 24.64 kg/m².    Lab Review:   No results found for: "HGBA1C"  No results found for: "CHOL", "HDL", "LDLCALC", "TRIG", "CHOLHDL"  Lab Results   Component Value Date     06/13/2023    K 3.4 (L) 06/13/2023     06/07/2023    CO2 25 06/13/2023    GLU 89 06/07/2023    BUN 11.6 06/13/2023    CREATININE 0.68 06/13/2023    CALCIUM 9.5 06/13/2023    PROT 6.3 04/28/2022    ALBUMIN 3.5 06/13/2023    BILITOT 0.5 06/13/2023    ALKPHOS 50 01/30/2018    AST 22 06/13/2023    ALT 20 06/13/2023    ANIONGAP 3 (L) 06/13/2023    ESTGFRAFRICA 101 06/13/2023    EGFRNONAA 107 04/28/2022    TSH 1.760 06/07/2023         Assessment and Plan     Postablative hypothyroidism  Clinically and biochemically euthyroid.  Cont Synthroid 100 mcg daily  Repeat TSH before visit next year    Graves' orbitopathy  Recently seen by Dr. Salmeron who recommended evaluation by oculoplastics, seen by Dr. Hough in the past  Also due for visit with Dr. Salmeron and will reach out to him about this        Follow up as planned with Dr. Markham in June 2024 with labs at [facility] prior to appt (TSH, FT4, vitamin D)        "

## 2023-11-24 NOTE — ASSESSMENT & PLAN NOTE
Clinically and biochemically euthyroid.  Cont Synthroid 100 mcg daily  Repeat TSH before visit next year

## 2023-11-24 NOTE — ASSESSMENT & PLAN NOTE
Recently seen by Dr. Salmeron who recommended evaluation by oculoplastics, seen by Dr. Hough in the past  Also due for visit with Dr. Salmeron and will reach out to him about this

## 2023-12-04 ENCOUNTER — TELEPHONE (OUTPATIENT)
Dept: OPTOMETRY | Facility: CLINIC | Age: 58
End: 2023-12-04
Payer: COMMERCIAL

## 2023-12-04 NOTE — TELEPHONE ENCOUNTER
----- Message from Anneliese Caban sent at 12/4/2023  1:22 PM CST -----  Regarding: FW: Marlen Request    ----- Message -----  From: Bonita Antunez  Sent: 12/4/2023   1:09 PM CST  To: [facility] Staff  Subject: Appt Request                                     Patient called to f/u on message to  schedule Follow up in about 1 year (around 5/10/2023) for refraction. Patient states she could not wait until June due to having vision issues.  Please call back to further assist- [phone]        
5

## 2023-12-28 DIAGNOSIS — E89.0 POSTABLATIVE HYPOTHYROIDISM: Primary | ICD-10-CM

## 2023-12-28 DIAGNOSIS — E05.00 GRAVES' ORBITOPATHY: ICD-10-CM

## 2023-12-30 ENCOUNTER — TELEPHONE (OUTPATIENT)
Dept: ENDOCRINOLOGY | Facility: CLINIC | Age: 58
End: 2023-12-30
Payer: COMMERCIAL

## 2023-12-30 LAB
T4 FREE SERPL-MCNC: 1.65 NG/DL (ref 0.82–1.77)
TSH SERPL DL<=0.005 MIU/L-ACNC: 1.21 UIU/ML (ref 0.45–4.5)

## 2023-12-30 NOTE — TELEPHONE ENCOUNTER
Please advise patient that I reviewed her labs. Her thyroid levels are both in a good range on her current dose of thyroid hormone. If she continues to feel poorly she should check in with her primary care doctor and other specialists to see what else could be causing this.

## 2024-01-02 ENCOUNTER — TELEPHONE (OUTPATIENT)
Dept: ENDOCRINOLOGY | Facility: CLINIC | Age: 59
End: 2024-01-02
Payer: COMMERCIAL

## 2024-01-02 NOTE — TELEPHONE ENCOUNTER
Per Shahrzad    Please advise patient that I reviewed her labs. Her thyroid levels are both in a good range on her current dose of thyroid hormone. If she continues to feel poorly she should check in with her primary care doctor and other specialists to see what else could be causing this.

## 2024-01-05 ENCOUNTER — OFFICE VISIT (OUTPATIENT)
Dept: OPHTHALMOLOGY | Facility: CLINIC | Age: 59
End: 2024-01-05
Payer: COMMERCIAL

## 2024-01-05 DIAGNOSIS — Z96.1 PSEUDOPHAKIA, LEFT EYE: ICD-10-CM

## 2024-01-05 DIAGNOSIS — H43.812 POSTERIOR VITREOUS DETACHMENT, LEFT: Primary | ICD-10-CM

## 2024-01-05 DIAGNOSIS — H47.20 OPTIC ATROPHY, BOTH EYES: ICD-10-CM

## 2024-01-05 DIAGNOSIS — H16.213 EXPOSURE KERATOPATHY, BILATERAL: ICD-10-CM

## 2024-01-05 DIAGNOSIS — H43.821 VITREOMACULAR ADHESION, RIGHT: ICD-10-CM

## 2024-01-05 DIAGNOSIS — H25.11 AGE-RELATED NUCLEAR CATARACT, RIGHT: ICD-10-CM

## 2024-01-05 DIAGNOSIS — E05.00 GRAVES' ORBITOPATHY: ICD-10-CM

## 2024-01-05 PROCEDURE — 92014 COMPRE OPH EXAM EST PT 1/>: CPT | Mod: S$GLB,,, | Performed by: OPHTHALMOLOGY

## 2024-01-05 PROCEDURE — 92134 CPTRZ OPH DX IMG PST SGM RTA: CPT | Mod: S$GLB,,, | Performed by: OPHTHALMOLOGY

## 2024-01-05 PROCEDURE — 1160F RVW MEDS BY RX/DR IN RCRD: CPT | Mod: CPTII,S$GLB,, | Performed by: OPHTHALMOLOGY

## 2024-01-05 PROCEDURE — 99999 PR PBB SHADOW E&M-EST. PATIENT-LVL III: CPT | Mod: PBBFAC,,, | Performed by: OPHTHALMOLOGY

## 2024-01-05 PROCEDURE — 1159F MED LIST DOCD IN RCRD: CPT | Mod: CPTII,S$GLB,, | Performed by: OPHTHALMOLOGY

## 2024-01-05 PROCEDURE — 92202 OPSCPY EXTND ON/MAC DRAW: CPT | Mod: 59,S$GLB,, | Performed by: OPHTHALMOLOGY

## 2024-01-05 RX ORDER — PREGABALIN 100 MG/1
1 CAPSULE ORAL 3 TIMES DAILY
COMMUNITY
Start: 2023-12-05 | End: 2024-02-15

## 2024-01-05 NOTE — PROGRESS NOTES
HPI    1m OCT/DFE    Pt states va is unchanged since last visit. Stable floater OS. Occasional   sharp pain OD>OS.   Pt has alber next month with Dr. Sandoval. Pt states wearing older glasses   because newer ones do not help.    Flashes OU  Floaters OS  Diplopia OS  Pain   No gtts    POHx:  Posterior vitreous detachment, left    Vitreomacular adhesion, right    Graves' orbitopathy    Optic atrophy, both eyes    Pseudophakia, left eye    Age-related nuclear cataract, right      Last edited by Kassandra Nelson on 1/5/2024 11:01 AM.         A/P    ICD-10-CM ICD-9-CM   1. Posterior vitreous detachment, left  H43.812 379.21   2. Vitreomacular adhesion, right  H43.821 379.27   3. Graves' orbitopathy  E05.00 242.00     376.21   4. Exposure keratopathy, bilateral  H16.213 370.34   5. Optic atrophy, both eyes  H47.20 377.10   6. Pseudophakia, left eye  Z96.1 V43.1   7. Age-related nuclear cataract, right  H25.11 366.16         1. Posterior vitreous detachment, left  Here for new floater f/u    Pt feels vision has gotten better and floaters are better     Today has PVD OS, VMA OD, no RT/RD on exam   Plan: Observation   Pathology of PVD, Retinal Tear, Retinal Detachment reviewed in great detail  RD precautions discussed in detail, patient expressed understanding  RTC immediately PRN (especially ANY change flashes, floaters, vision, visual field)     2. Vitreomacular adhesion, right  Mild VMA, no RT/RD  Plan: Observation, counseled on RT/RD risk     3. Graves' orbitopathy  4. Exposure keratopathy, bilateral  5. Optic atrophy, both eyes  Prev f/b Dr. Hough and outside oculoplastics doctors - Dr. Hough's last note from 2021 reviewed  Pt with significant vision dysfunction due to Graves dz  Has had decompression in past  F/b Dr. Markham for endo- notes reviewed , has appt later this year    Has some exposure keratopathy with stinging, llkely due to retraction 2/2 to Graves, has appt w [place] coming  Plan:  pt has eval for lid retraction w  Dr Hough at end of the month. Recommend to use Ats QID and gel tears qHS OU    6. Pseudophakia, left eye  Good lens position  Plan: Observation     7. Age-related nuclear cataract, right  Mod NS, borderline VS  Plan: Observation for now, update Mrx prn    RTC Faviola 12 mo DFE/OCTm OU  RTC [place] as scheduled         I saw and examined the patient and reviewed in detail the findings documented. The final examination findings, image interpretations which have been independently interpreted, and plan as documented in the record represent my personal judgment and conclusions.    Neri Salmeron MD  Vitreoretinal Surgery   [facility]

## 2024-01-30 ENCOUNTER — TELEPHONE (OUTPATIENT)
Dept: OPTOMETRY | Facility: CLINIC | Age: 59
End: 2024-01-30
Payer: COMMERCIAL

## 2024-01-30 NOTE — TELEPHONE ENCOUNTER
----- Message from Maren Kenyon sent at 1/30/2024 12:36 PM CST -----  Regarding: Consult/Advisory  Contact: Alina Rendon  Consult/Advisory    Name Of Caller: Alina Rendon       Contact Preference: [phone] (home)      Nature of call: Patient calling to speak to Karly regarding appt that was rescheduled to 3/14/2024 and was looking for 2/14/2024. Patient expressing dissatisfaction and states that it is important to speak with Karly. Requesting a call back to further discuss.

## 2024-02-15 ENCOUNTER — OFFICE VISIT (OUTPATIENT)
Dept: FAMILY MEDICINE | Facility: CLINIC | Age: 59
End: 2024-02-15
Payer: COMMERCIAL

## 2024-02-15 VITALS
SYSTOLIC BLOOD PRESSURE: 112 MMHG | TEMPERATURE: 98 F | HEIGHT: 66 IN | HEART RATE: 83 BPM | WEIGHT: 146.69 LBS | BODY MASS INDEX: 23.58 KG/M2 | OXYGEN SATURATION: 98 % | DIASTOLIC BLOOD PRESSURE: 84 MMHG

## 2024-02-15 DIAGNOSIS — G61.81 CIDP (CHRONIC INFLAMMATORY DEMYELINATING POLYNEUROPATHY): ICD-10-CM

## 2024-02-15 DIAGNOSIS — Z00.00 ENCOUNTER FOR MEDICAL EXAMINATION TO ESTABLISH CARE: Primary | ICD-10-CM

## 2024-02-15 DIAGNOSIS — Z00.00 ANNUAL PHYSICAL EXAM: ICD-10-CM

## 2024-02-15 DIAGNOSIS — Z87.891 HISTORY OF TOBACCO USE: ICD-10-CM

## 2024-02-15 DIAGNOSIS — E03.9 HYPOTHYROIDISM, UNSPECIFIED TYPE: ICD-10-CM

## 2024-02-15 DIAGNOSIS — Z91.048 ALLERGY TO ADHESIVE: ICD-10-CM

## 2024-02-15 DIAGNOSIS — L29.9 PRURITUS: ICD-10-CM

## 2024-02-15 DIAGNOSIS — N95.1 HOT FLASHES DUE TO MENOPAUSE: ICD-10-CM

## 2024-02-15 PROBLEM — D84.9 IMMUNOCOMPROMISED PATIENT: Status: ACTIVE | Noted: 2024-02-15

## 2024-02-15 PROBLEM — E66.01 MORBID OBESITY: Status: RESOLVED | Noted: 2023-06-09 | Resolved: 2024-02-15

## 2024-02-15 PROCEDURE — 3008F BODY MASS INDEX DOCD: CPT | Mod: CPTII,S$GLB,, | Performed by: FAMILY MEDICINE

## 2024-02-15 PROCEDURE — 99999 PR PBB SHADOW E&M-EST. PATIENT-LVL IV: CPT | Mod: PBBFAC,,, | Performed by: FAMILY MEDICINE

## 2024-02-15 PROCEDURE — 3079F DIAST BP 80-89 MM HG: CPT | Mod: CPTII,S$GLB,, | Performed by: FAMILY MEDICINE

## 2024-02-15 PROCEDURE — 3074F SYST BP LT 130 MM HG: CPT | Mod: CPTII,S$GLB,, | Performed by: FAMILY MEDICINE

## 2024-02-15 PROCEDURE — 1159F MED LIST DOCD IN RCRD: CPT | Mod: CPTII,S$GLB,, | Performed by: FAMILY MEDICINE

## 2024-02-15 PROCEDURE — 99386 PREV VISIT NEW AGE 40-64: CPT | Mod: S$GLB,,, | Performed by: FAMILY MEDICINE

## 2024-02-15 PROCEDURE — 1160F RVW MEDS BY RX/DR IN RCRD: CPT | Mod: CPTII,S$GLB,, | Performed by: FAMILY MEDICINE

## 2024-02-15 RX ORDER — DOXYCYCLINE HYCLATE 100 MG
100 TABLET ORAL 2 TIMES DAILY
COMMUNITY
Start: 2024-01-21 | End: 2024-02-15

## 2024-02-15 NOTE — PROGRESS NOTES
Assessment & Plan:    Encounter for medical examination to establish care    Annual physical exam  -     HIV 1/2 Ag/Ab (4th Gen); Future; Expected date: 02/15/2024  -     Hepatitis C Antibody; Future; Expected date: 02/15/2024  -     Lipid Panel; Future; Expected date: 02/15/2024  -     Hemoglobin A1C; Future; Expected date: 02/15/2024    Fasting labs sent to [facility].    CIDP (chronic inflammatory demyelinating polyneuropathy)  Recommended trial of Nervive or alpha-lipoic acid 600 mg daily.  May benefit from OMT in the future.     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    Pruritus  May titrate up hydroxyzine to 1.5 - 2 tabs qhs. She is welcome to request refill of higher dose tab over the portal.    Hot flashes due to menopause  May continue Gralise.    Hypothyroidism, unspecified type  Labs reviewed from December - controlled.     History of tobacco use   Recommended LDCT for lung CA screening which she declines.    Health maintenance reviewed & addressed:  -Defers mammogram to next appointment  -Defers cervical CA screening     Declines all vaccines at this time.     Follow-up: Follow up in about 6 months (around 8/15/2024).  ______________________________________________________________________    Chief Complaint  Chief Complaint   Patient presents with    Establish Care       HPI  Alina Rendon is a 58 y.o. female with medical diagnoses as listed in the medical history and problem list that presents to the office to establish care. Patient has a history of CIDP for which she does plasmapharesis every 2 weeks via a RIJ tunneled HD catheter. She has been hospitalized for cellulitis around the catheter site last month, which was treated empirically with IV vancomycin while inpatient and doxycycline at discharge. She has a LUE AVF vs AVG scheduled on 2/19. Patient believes that she is allergic to the adhesives around her catheter site because of itching and redness. She has had her dressing changed  from what seemed to be a Tegaderm to a paper dressing. She has taken hydroxyzine 10 mg qhs for itching for several years. She had acute bronchitis around Thanksgiving and stopped taking all of her pain medications at that time. She also quit smoking and vaping. She began to experience severe night sweats, so she restarted her Gralise 600 mg with complete resolution. She is post-menopausal and asks if she should have her hormones checked.     Health Maintenance         Date Due Completion Date    Hepatitis C Screening Never done ---    Lipid Panel Never done ---    COVID-19 Vaccine (1) Never done ---    HIV Screening Never done ---    TETANUS VACCINE Never done ---    LDCT Lung Screen Never done ---    Shingles Vaccine (1 of 2) Never done ---    Mammogram 10/26/2017 10/26/2016    Cervical Cancer Screening 10/19/2019 10/19/2016    Influenza Vaccine (1) Never done ---    Colorectal Cancer Screening 06/07/2028 6/7/2018              PAST MEDICAL HISTORY:  Past Medical History:   Diagnosis Date    Cataract     CIDP (chronic inflammatory demyelinating polyneuropathy) 2012    Coronary artery disease     Grave's disease     s/p I-131 tx and Orbitopathy    Hypothyroidism     s/p I-1 31 tx    Neuropathy     Autoimmune etiology ( CIDP)    Screening for colorectal cancer 09/03/2019     normal 6/18, 10 years    Spondylolisthesis     Vitamin D deficiency        PAST SURGICAL HISTORY:  Past Surgical History:   Procedure Laterality Date    CATARACT EXTRACTION Left 09/11/2019    Surgeon: Dr. Kaushik Womack    DECOMPRESSION OF ORBIT Bilateral 2/18/2020    Procedure: DECOMPRESSION, ORBIT;  Surgeon: Donte Mcdonough MD;  Location: [facility] OR;  Service: ENT;  Laterality: Bilateral;  FESS set-up    DILATION AND CURETTAGE OF UTERUS      ENDOMETRIAL ABLATION  10/27/2006    Thermachoice ablation    ENDOSCOPIC NASAL SEPTOPLASTY N/A 2/18/2020    Procedure: SEPTOPLASTY, NOSE, ENDOSCOPIC;  Surgeon: Donte Mcdonough MD;  Location: [facility] OR;  Service:  ENT;  Laterality: N/A;  supplies available not open - possible  DISC LOADED BY REJI LUTZ ON  2020  RN Pre OP 2020    EYE SURGERY Left     cataract    fractional D&C  10/2006    with ThermaChoice ablation     FUNCTIONAL ENDOSCOPIC SINUS SURGERY (FESS) USING COMPUTER-ASSISTED NAVIGATION Bilateral 2020    Procedure: FESS, USING COMPUTER-ASSISTED NAVIGATION;  Surgeon: Donte Mcdonough MD;  Location: [facility];  Service: ENT;  Laterality: Bilateral;  For approach to orbital decompression    TEMPOROMANDIBULAR JOINT SURGERY      with skin grafts from Suprapubic area       SOCIAL HISTORY:  Social History     Socioeconomic History    Marital status:    Tobacco Use    Smoking status: Former     Current packs/day: 0.00     Average packs/day: 1 pack/day for 30.0 years (30.0 ttl pk-yrs)     Types: Cigarettes     Start date: 1980     Quit date: 2010     Years since quittin.2    Smokeless tobacco: Never   Substance and Sexual Activity    Alcohol use: Yes     Comment: rarely    Drug use: Never    Sexual activity: Yes     Partners: Male     Birth control/protection: None     Comment:  since : spouse: Shan       FAMILY HISTORY:  Family History   Problem Relation Age of Onset    Arthritis Mother     Thyroid disease Mother     Hyperlipidemia Mother     Diabetes Father     Hyperlipidemia Father     Breast cancer Neg Hx     Colon cancer Neg Hx     Ovarian cancer Neg Hx     Hypertension Neg Hx     Stroke Neg Hx        ALLERGIES AND MEDICATIONS: updated and reviewed.  Review of patient's allergies indicates:  No Known Allergies  Current Outpatient Medications   Medication Sig Dispense Refill    GRALISE 600 mg Tb24 Take 3 tablets by mouth once daily.      hydrOXYzine (ATARAX) 10 MG Tab Take 1 tablet by mouth Three times daily as needed.      ibuprofen (ADVIL,MOTRIN) 600 MG tablet Take 1 tablet (600 mg total) by mouth every 6 (six) hours as needed for Pain. 30 tablet 1    magnesium 30 mg Tab  "Take 1 tablet by mouth 2 (two) times daily.      SYNTHROID 100 mcg tablet Take 1 tablet (100 mcg total) by mouth before breakfast. 30 tablet 11    IMMUNE GLOBULIN,GAMMA,IGG, (IMMUNE GLOBULIN, HUMAN,, IGG, IV) Inject into the vein every 30 days.       No current facility-administered medications for this visit.         ROS  Review of Systems   Constitutional:  Positive for diaphoresis. Negative for activity change.   Musculoskeletal:  Positive for gait problem.   Skin:  Positive for color change.   Psychiatric/Behavioral:  Positive for sleep disturbance.            Physical Exam  Vitals:    02/15/24 1653   BP: 112/84   BP Location: Right arm   Patient Position: Sitting   BP Method: Medium (Manual)   Pulse: 83   Temp: 98.1 °F (36.7 °C)   TempSrc: Oral   SpO2: 98%   Weight: 66.6 kg (146 lb 11.5 oz)   Height: 5' 6" (1.676 m)    Body mass index is 23.68 kg/m².  Weight: 66.6 kg (146 lb 11.5 oz)   Height: 5' 6" (167.6 cm)   Physical Exam  Constitutional:       General: She is not in acute distress.     Appearance: Normal appearance.   HENT:      Head: Normocephalic and atraumatic.   Neck:      Thyroid: No thyromegaly.      Vascular: No carotid bruit.   Cardiovascular:      Rate and Rhythm: Normal rate and regular rhythm.      Pulses: Normal pulses.      Heart sounds: Normal heart sounds.   Pulmonary:      Effort: Pulmonary effort is normal. No respiratory distress.      Breath sounds: Normal breath sounds.   Musculoskeletal:      Cervical back: Neck supple.      Right lower leg: No edema.      Left lower leg: No edema.      Comments: In a wheelchair   Lymphadenopathy:      Cervical: No cervical adenopathy.   Skin:     General: Skin is warm and dry.      Comments: Mild erythema and rough appearance of the skin around catheter site   Neurological:      Mental Status: She is alert and oriented to person, place, and time.   Psychiatric:         Mood and Affect: Mood normal.         Behavior: Behavior normal.         Thought " Content: Thought content normal.

## 2024-02-27 ENCOUNTER — OFFICE VISIT (OUTPATIENT)
Dept: OPTOMETRY | Facility: CLINIC | Age: 59
End: 2024-02-27
Payer: COMMERCIAL

## 2024-02-27 DIAGNOSIS — H54.3 LOW VISION, BOTH EYES: Primary | ICD-10-CM

## 2024-02-27 DIAGNOSIS — H52.7 REFRACTIVE ERROR: ICD-10-CM

## 2024-02-27 PROCEDURE — 1159F MED LIST DOCD IN RCRD: CPT | Mod: CPTII,S$GLB,, | Performed by: OPTOMETRIST

## 2024-02-27 PROCEDURE — 99213 OFFICE O/P EST LOW 20 MIN: CPT | Mod: S$GLB,,, | Performed by: OPTOMETRIST

## 2024-02-27 PROCEDURE — 99999 PR PBB SHADOW E&M-EST. PATIENT-LVL II: CPT | Mod: PBBFAC,,, | Performed by: OPTOMETRIST

## 2024-02-27 PROCEDURE — 1160F RVW MEDS BY RX/DR IN RCRD: CPT | Mod: CPTII,S$GLB,, | Performed by: OPTOMETRIST

## 2024-02-27 PROCEDURE — 92015 DETERMINE REFRACTIVE STATE: CPT | Mod: S$GLB,,, | Performed by: OPTOMETRIST

## 2024-02-27 NOTE — PROGRESS NOTES
HPI     Eye Problem     Additional comments: Blur ou at dist/near, x mos, no assoc pain or red,   no relief over time, constant           Comments    Could not adjust to lined bifocal  Low vision, has gone to [facility], has magnifiers that she uses  Faviola pt  Has constant diplopia, unable to correct with prism last visit          Last edited by Elijah Sandoval, OD on 2/27/2024 11:05 AM.            Assessment /Plan     For exam results, see Encounter Report.    Low vision, both eyes    Refractive error      1,2. Spectacle Rx given, not adjusted to lined bifocal, has been to [facility], try Fresnel prism, diplopia constant, discussed different options for glasses. RTC 1 year refraction.   Discussed diplopia and possible fail with prism, will try Fresnel first  I spent a total of 20 minutes on the day of the visit.  This includes face to face time and non-face to face time preparing to see the patient (eg, review of tests), obtaining and/or reviewing separately obtained history, documenting clinical information in the electronic or other health record, independently interpreting results and communicating results to the patient/family/caregiver, or care coordinator.         Addend 2/27/24 adjusted ADD  PER PT REQUEST

## 2024-02-28 ENCOUNTER — TELEPHONE (OUTPATIENT)
Dept: OPTOMETRY | Facility: CLINIC | Age: 59
End: 2024-02-28
Payer: COMMERCIAL

## 2024-03-14 ENCOUNTER — OFFICE VISIT (OUTPATIENT)
Dept: OPHTHALMOLOGY | Facility: CLINIC | Age: 59
End: 2024-03-14
Payer: COMMERCIAL

## 2024-03-14 DIAGNOSIS — E05.00 GRAVES' ORBITOPATHY: ICD-10-CM

## 2024-03-14 DIAGNOSIS — H43.821 VITREOMACULAR ADHESION, RIGHT: ICD-10-CM

## 2024-03-14 DIAGNOSIS — Z96.1 PSEUDOPHAKIA, LEFT EYE: ICD-10-CM

## 2024-03-14 DIAGNOSIS — H53.2 DIPLOPIA: ICD-10-CM

## 2024-03-14 DIAGNOSIS — H16.213 EXPOSURE KERATOPATHY, BILATERAL: ICD-10-CM

## 2024-03-14 DIAGNOSIS — H02.059 TRICHIASIS, UNSPECIFIED LATERALITY: Primary | ICD-10-CM

## 2024-03-14 PROCEDURE — 92285 EXTERNAL OCULAR PHOTOGRAPHY: CPT | Mod: S$GLB,,, | Performed by: OPHTHALMOLOGY

## 2024-03-14 PROCEDURE — 1159F MED LIST DOCD IN RCRD: CPT | Mod: CPTII,S$GLB,, | Performed by: OPHTHALMOLOGY

## 2024-03-14 PROCEDURE — 1160F RVW MEDS BY RX/DR IN RCRD: CPT | Mod: CPTII,S$GLB,, | Performed by: OPHTHALMOLOGY

## 2024-03-14 PROCEDURE — 99214 OFFICE O/P EST MOD 30 MIN: CPT | Mod: S$GLB,,, | Performed by: OPHTHALMOLOGY

## 2024-03-14 PROCEDURE — 99999 PR PBB SHADOW E&M-EST. PATIENT-LVL III: CPT | Mod: PBBFAC,,, | Performed by: OPHTHALMOLOGY

## 2024-03-14 NOTE — PROGRESS NOTES
HPI    Alina Rendon is a/an 58 y.o. female here for lid retraction evaluation.   Referred by: Faviola  Eye Meds: none     Patient here due to concerns of lashes turning into her eye and poking   causing great discomfort. She often has her children pluck the lashes for   her. She feels as if she has has a great deal of visual decrease due to   her graves disease.   She has had orbital decompression OU and tepezza infusions.       Last edited by Karly Najera on 3/14/2024 10:34 AM.            Assessment /Plan     For exam results, see Encounter Report.    Trichiasis, unspecified laterality    Exposure keratopathy, bilateral    Graves' orbitopathy    Vitreomacular adhesion, right    Pseudophakia, left eye    Diplopia      The patient is a pleasant 58-year-old female here for evaluation of bilateral ocular irritation worse on the right than the left especially after awakening.  She is referred by my colleague Dr. Salmeron.  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  She has a history of bilateral decompression and has completed a course of teprotumumab infusions.     On exam, the patient has good closure bilaterally.  There is trace injection inferiorly bilaterally.  There are no aberrant lashes visualized at this time.    The patient notices the irritation 1st thing in the morning upon awakening. The patient has had her  and her son ablate the right upper and right lower eyelashes as needed as they become very troublesome.  The most recent episode was 1-1/2 weeks ago which explains why there are no aberrant lashes visualized today.    Return in 4-5 weeks on procedure day for consideration of radiofrequency ablation of any aberrant hairs.      The risks benefits alternatives were discussed with the patient prior to obtaining informed consent.      Return in 4-5 weeks sooner any worsening    Start preservative-free artificial tears 1-2 times  daily.  Recommend starting GenTeal ointment or Systane p.m. ointment to both eyes prior to bedtime.    Consider strabismus evaluation in the future for diplopia.

## 2024-07-09 ENCOUNTER — PATIENT MESSAGE (OUTPATIENT)
Dept: ENDOCRINOLOGY | Facility: CLINIC | Age: 59
End: 2024-07-09
Payer: COMMERCIAL

## 2024-07-09 DIAGNOSIS — G61.81 CIDP (CHRONIC INFLAMMATORY DEMYELINATING POLYNEUROPATHY): ICD-10-CM

## 2024-07-09 RX ORDER — LEVOTHYROXINE SODIUM 100 UG/1
100 TABLET ORAL
Qty: 30 TABLET | Refills: 11 | Status: SHIPPED | OUTPATIENT
Start: 2024-07-09

## 2024-08-02 ENCOUNTER — TELEPHONE (OUTPATIENT)
Dept: ENDOCRINOLOGY | Facility: CLINIC | Age: 59
End: 2024-08-02
Payer: COMMERCIAL

## 2024-08-02 DIAGNOSIS — E89.0 POSTABLATIVE HYPOTHYROIDISM: Primary | ICD-10-CM

## 2024-08-02 NOTE — TELEPHONE ENCOUNTER
----- Message from Marty Markham MD sent at 8/2/2024  1:48 PM CDT -----  Regarding: RE: Callback  Contact: [phone]  Labs sent to [facility] for TSH, free T4 and vitamin d.  ----- Message -----  From: Aure Wylie MA  Sent: 8/2/2024   1:28 PM CDT  To: Marty Markham MD  Subject: FW: Callback                                     Hi does pt need blood work prior to appt on 8/7 ? Thanks  ----- Message -----  From: Geoffrey Bernal  Sent: 8/2/2024  11:43 AM CDT  To: Shahrzad Ferguson Staff  Subject: Callback                                         Patient calling requesting a callback from nurse or provider. She said she don't think she have all her blood work in for upcoming appointment and need a call back. Please call back as soon as possible.

## 2024-08-02 NOTE — TELEPHONE ENCOUNTER
Called pt to let her know that lab orders were put in. Py stated that insurance gives her a lot of trouble with vitamin D so that will not be tested. She also said her TSH lab was done yesterday. I spoke to  and let him know and he said she does not need labs done then. I let pt know and she was okay with it.

## 2024-08-07 ENCOUNTER — OFFICE VISIT (OUTPATIENT)
Dept: ENDOCRINOLOGY | Facility: CLINIC | Age: 59
End: 2024-08-07
Payer: COMMERCIAL

## 2024-08-07 DIAGNOSIS — E89.0 POSTABLATIVE HYPOTHYROIDISM: Primary | ICD-10-CM

## 2024-08-07 DIAGNOSIS — E55.9 VITAMIN D DEFICIENCY DISEASE: ICD-10-CM

## 2024-08-07 DIAGNOSIS — Z86.39 HX OF GRAVES' DISEASE: ICD-10-CM

## 2024-08-07 DIAGNOSIS — E05.00 GRAVES' ORBITOPATHY: ICD-10-CM

## 2024-08-07 PROCEDURE — 99214 OFFICE O/P EST MOD 30 MIN: CPT | Mod: 95,,, | Performed by: INTERNAL MEDICINE

## 2024-08-07 PROCEDURE — 3044F HG A1C LEVEL LT 7.0%: CPT | Mod: CPTII,95,, | Performed by: INTERNAL MEDICINE

## 2024-08-07 PROCEDURE — 1159F MED LIST DOCD IN RCRD: CPT | Mod: CPTII,95,, | Performed by: INTERNAL MEDICINE

## 2024-08-07 PROCEDURE — 1160F RVW MEDS BY RX/DR IN RCRD: CPT | Mod: CPTII,95,, | Performed by: INTERNAL MEDICINE

## 2024-08-29 ENCOUNTER — OFFICE VISIT (OUTPATIENT)
Dept: FAMILY MEDICINE | Facility: CLINIC | Age: 59
End: 2024-08-29
Payer: COMMERCIAL

## 2024-08-29 VITALS
HEART RATE: 91 BPM | WEIGHT: 143.44 LBS | SYSTOLIC BLOOD PRESSURE: 118 MMHG | DIASTOLIC BLOOD PRESSURE: 62 MMHG | BODY MASS INDEX: 23.15 KG/M2 | TEMPERATURE: 98 F | OXYGEN SATURATION: 98 %

## 2024-08-29 DIAGNOSIS — Z87.891 HISTORY OF TOBACCO USE: ICD-10-CM

## 2024-08-29 DIAGNOSIS — Z12.2 SCREENING FOR LUNG CANCER: ICD-10-CM

## 2024-08-29 DIAGNOSIS — E05.00 GRAVES DISEASE: ICD-10-CM

## 2024-08-29 DIAGNOSIS — D84.9 IMMUNOCOMPROMISED PATIENT: ICD-10-CM

## 2024-08-29 DIAGNOSIS — Z12.31 ENCOUNTER FOR SCREENING MAMMOGRAM FOR MALIGNANT NEOPLASM OF BREAST: ICD-10-CM

## 2024-08-29 DIAGNOSIS — N30.01 ACUTE CYSTITIS WITH HEMATURIA: Primary | ICD-10-CM

## 2024-08-29 DIAGNOSIS — G61.81 CIDP (CHRONIC INFLAMMATORY DEMYELINATING POLYNEUROPATHY): ICD-10-CM

## 2024-08-29 DIAGNOSIS — E89.0 POSTABLATIVE HYPOTHYROIDISM: ICD-10-CM

## 2024-08-29 DIAGNOSIS — E78.5 HYPERLIPIDEMIA, UNSPECIFIED HYPERLIPIDEMIA TYPE: ICD-10-CM

## 2024-08-29 LAB
BILIRUB SERPL-MCNC: NORMAL MG/DL
BLOOD URINE, POC: 250
COLOR, POC UA: NORMAL
GLUCOSE UR QL STRIP: 100
KETONES UR QL STRIP: NORMAL
LEUKOCYTE ESTERASE URINE, POC: NEGATIVE
NITRITE, POC UA: NORMAL
PH, POC UA: 6
PROTEIN, POC: NEGATIVE
SPECIFIC GRAVITY, POC UA: 1
UROBILINOGEN, POC UA: NORMAL

## 2024-08-29 PROCEDURE — 99214 OFFICE O/P EST MOD 30 MIN: CPT | Mod: S$GLB,,, | Performed by: FAMILY MEDICINE

## 2024-08-29 PROCEDURE — 3044F HG A1C LEVEL LT 7.0%: CPT | Mod: CPTII,S$GLB,, | Performed by: FAMILY MEDICINE

## 2024-08-29 PROCEDURE — 3078F DIAST BP <80 MM HG: CPT | Mod: CPTII,S$GLB,, | Performed by: FAMILY MEDICINE

## 2024-08-29 PROCEDURE — 99999 PR PBB SHADOW E&M-EST. PATIENT-LVL IV: CPT | Mod: PBBFAC,,, | Performed by: FAMILY MEDICINE

## 2024-08-29 PROCEDURE — 87088 URINE BACTERIA CULTURE: CPT | Performed by: FAMILY MEDICINE

## 2024-08-29 PROCEDURE — 87086 URINE CULTURE/COLONY COUNT: CPT | Performed by: FAMILY MEDICINE

## 2024-08-29 PROCEDURE — G2211 COMPLEX E/M VISIT ADD ON: HCPCS | Mod: S$GLB,,, | Performed by: FAMILY MEDICINE

## 2024-08-29 PROCEDURE — 1159F MED LIST DOCD IN RCRD: CPT | Mod: CPTII,S$GLB,, | Performed by: FAMILY MEDICINE

## 2024-08-29 PROCEDURE — 81001 URINALYSIS AUTO W/SCOPE: CPT | Mod: S$GLB,,, | Performed by: FAMILY MEDICINE

## 2024-08-29 PROCEDURE — 3074F SYST BP LT 130 MM HG: CPT | Mod: CPTII,S$GLB,, | Performed by: FAMILY MEDICINE

## 2024-08-29 PROCEDURE — 87186 SC STD MICRODIL/AGAR DIL: CPT | Performed by: FAMILY MEDICINE

## 2024-08-29 PROCEDURE — 1160F RVW MEDS BY RX/DR IN RCRD: CPT | Mod: CPTII,S$GLB,, | Performed by: FAMILY MEDICINE

## 2024-08-29 PROCEDURE — 3008F BODY MASS INDEX DOCD: CPT | Mod: CPTII,S$GLB,, | Performed by: FAMILY MEDICINE

## 2024-08-29 RX ORDER — CIPROFLOXACIN 250 MG/1
250 TABLET, FILM COATED ORAL 2 TIMES DAILY
Qty: 6 TABLET | Refills: 0 | Status: SHIPPED | OUTPATIENT
Start: 2024-08-29 | End: 2024-09-01

## 2024-08-29 NOTE — PROGRESS NOTES
Assessment & Plan:    Acute cystitis with hematuria  -     POCT urinalysis, dipstick or tablet reag  -     Urine culture  -     ciprofloxacin HCl (CIPRO) 250 MG tablet; Take 1 tablet (250 mg total) by mouth 2 (two) times daily. for 3 days  Dispense: 6 tablet; Refill: 0    POC UA: 250 RBC, 100 glucose, -nitrites/LE    Patient is c/o sal symptoms of a UTI so I will treat her empirically. Urine culture pending - adjust treatment if needed.    Hyperlipidemia, unspecified hyperlipidemia type     The 10-year ASCVD risk score (Zack NAVARRO, et al., 2019) is: 2.2%    Values used to calculate the score:      Age: 58 years      Sex: Female      Is Non- : No      Diabetic: No      Tobacco smoker: No      Systolic Blood Pressure: 118 mmHg      Is BP treated: No      HDL Cholesterol: 67 mg/dL      Total Cholesterol: 201 mg/dL    TC and LDL cholesterol elevated but HDL protective. Briefly discussed low fat diet.     CIDP (chronic inflammatory demyelinating polyneuropathy)  Immunocompromised patient  Controlled. Continue current therapy.     Graves disease  Postablative hypothyroidism  Controlled. Continue current therapy.     Encounter for screening mammogram for malignant neoplasm of breast  -     Mammo Digital Screening Bilat; Future; Expected date: 08/29/2024    History of tobacco use  -     CT Chest Lung Screening Low Dose; Future; Expected date: 08/29/2024    Screening for lung cancer  -     CT Chest Lung Screening Low Dose; Future; Expected date: 08/29/2024    Health maintenance reviewed & addressed above.     Follow-up: Follow up in about 3 months (around 11/29/2024) for well woman visit.  ______________________________________________________________________    Chief Complaint  Chief Complaint   Patient presents with    Health Maintenance     Labs results    Urinary Tract Infection     Blood in urine       HPI  Alina Rendon is a 58 y.o. female with medical diagnoses as listed in the medical  history and problem list that presents to the office for a health maintenance exam. She began having urinary urgency yesterday. She is having gross hematuria, pelvic discomfort, and dysuria today.         Health Maintenance         Date Due Completion Date    TETANUS VACCINE Never done ---    LDCT Lung Screen Never done ---    Shingles Vaccine (1 of 2) Never done ---    Mammogram 10/26/2017 10/26/2016    Cervical Cancer Screening 10/19/2019 10/19/2016    COVID-19 Vaccine (1 - 2023-24 season) Never done ---    Influenza Vaccine (1) 09/01/2024 ---    Colorectal Cancer Screening 06/07/2028 6/7/2018    Lipid Panel 08/01/2029 8/1/2024              PAST MEDICAL HISTORY:  Past Medical History:   Diagnosis Date    Cataract     CIDP (chronic inflammatory demyelinating polyneuropathy) 2012    Coronary artery disease     Grave's disease     s/p I-131 tx and Orbitopathy    Hypothyroidism     s/p I-1 31 tx    Neuropathy     Autoimmune etiology ( CIDP)    Screening for colorectal cancer 09/03/2019     normal 6/18, 10 years    Spondylolisthesis     Vitamin D deficiency        PAST SURGICAL HISTORY:  Past Surgical History:   Procedure Laterality Date    CATARACT EXTRACTION Left 09/11/2019    Surgeon: Dr. Kaushik Womack    DECOMPRESSION OF ORBIT Bilateral 2/18/2020    Procedure: DECOMPRESSION, ORBIT;  Surgeon: Donte Mcdonough MD;  Location: [facility] OR;  Service: ENT;  Laterality: Bilateral;  FESS set-up    DILATION AND CURETTAGE OF UTERUS      ENDOMETRIAL ABLATION  10/27/2006    Thermachoice ablation    ENDOSCOPIC NASAL SEPTOPLASTY N/A 2/18/2020    Procedure: SEPTOPLASTY, NOSE, ENDOSCOPIC;  Surgeon: Donte Mcdonough MD;  Location: [facility] OR;  Service: ENT;  Laterality: N/A;  supplies available not open - possible  DISC LOADED BY REJI LUTZ ON  2-  RN Pre OP 2-    EYE SURGERY Left     cataract    fractional D&C  10/2006    with ThermaChoice ablation     FUNCTIONAL ENDOSCOPIC SINUS SURGERY (FESS) USING COMPUTER-ASSISTED NAVIGATION  Bilateral 2020    Procedure: FESS, USING COMPUTER-ASSISTED NAVIGATION;  Surgeon: Donte Mcdonough MD;  Location: [facility];  Service: ENT;  Laterality: Bilateral;  For approach to orbital decompression    TEMPOROMANDIBULAR JOINT SURGERY      with skin grafts from Suprapubic area       SOCIAL HISTORY:  Social History     Socioeconomic History    Marital status:    Tobacco Use    Smoking status: Former     Current packs/day: 0.00     Average packs/day: 1 pack/day for 30.0 years (30.0 ttl pk-yrs)     Types: Cigarettes     Start date: 1980     Quit date: 2010     Years since quittin.7    Smokeless tobacco: Never   Substance and Sexual Activity    Alcohol use: Yes     Comment: rarely    Drug use: Never    Sexual activity: Yes     Partners: Male     Birth control/protection: None     Comment:  since : spouse: Gustavo     Social Determinants of Health     Financial Resource Strain: Low Risk  (2024)    Overall Financial Resource Strain (CARDIA)     Difficulty of Paying Living Expenses: Not very hard   Food Insecurity: Patient Declined (2024)    Hunger Vital Sign     Worried About Running Out of Food in the Last Year: Patient declined     Ran Out of Food in the Last Year: Patient declined   Physical Activity: Unknown (2024)    Exercise Vital Sign     Days of Exercise per Week: Patient declined   Stress: No Stress Concern Present (2024)    [employer] [place] [employer] - Occupational Stress Questionnaire     Feeling of Stress : Only a little   Housing Stability: Unknown (2024)    Housing Stability Vital Sign     Unable to Pay for Housing in the Last Year: Patient declined       FAMILY HISTORY:  Family History   Problem Relation Name Age of Onset    Arthritis Mother      Thyroid disease Mother      Hyperlipidemia Mother      Diabetes Father      Hyperlipidemia Father      Breast cancer Neg Hx      Colon cancer Neg Hx      Ovarian cancer Neg Hx      Hypertension  Neg Hx      Stroke Neg Hx         ALLERGIES AND MEDICATIONS: updated and reviewed.  Review of patient's allergies indicates:  No Known Allergies  Current Outpatient Medications   Medication Sig Dispense Refill    GRALISE 600 mg Tb24 Take 3 tablets by mouth once daily.      hydrOXYzine (ATARAX) 10 MG Tab Take 1 tablet by mouth Three times daily as needed.      ibuprofen (ADVIL,MOTRIN) 600 MG tablet Take 1 tablet (600 mg total) by mouth every 6 (six) hours as needed for Pain. 30 tablet 1    IMMUNE GLOBULIN,GAMMA,IGG, (IMMUNE GLOBULIN, HUMAN,, IGG, IV) Inject into the vein every 30 days.      magnesium 30 mg Tab Take 1 tablet by mouth 2 (two) times daily.      SYNTHROID 100 mcg tablet TAKE ONE TABLET BY MOUTH BEFORE BREAKFAST 30 tablet 11    ciprofloxacin HCl (CIPRO) 250 MG tablet Take 1 tablet (250 mg total) by mouth 2 (two) times daily. for 3 days 6 tablet 0     No current facility-administered medications for this visit.         ROS  Review of Systems   Genitourinary:  Positive for dysuria, hematuria, pelvic pain and urgency.           Physical Exam  Vitals:    08/29/24 1314   BP: 118/62   Pulse: 91   Temp: 98.3 °F (36.8 °C)   TempSrc: Oral   SpO2: 98%   Weight: 65.1 kg (143 lb 6.6 oz)    Body mass index is 23.15 kg/m².  Weight: 65.1 kg (143 lb 6.6 oz)       Physical Exam  Constitutional:       General: She is not in acute distress.  HENT:      Head: Normocephalic and atraumatic.   Neck:      Thyroid: No thyromegaly.      Vascular: No carotid bruit.   Cardiovascular:      Rate and Rhythm: Normal rate and regular rhythm.      Pulses: Normal pulses.      Heart sounds: Normal heart sounds.   Pulmonary:      Effort: Pulmonary effort is normal. No respiratory distress.      Breath sounds: Normal breath sounds.   Musculoskeletal:      Cervical back: Neck supple.      Right lower leg: No edema.      Left lower leg: No edema.      Comments: In a wheelchair     Lymphadenopathy:      Cervical: No cervical adenopathy.    Skin:     General: Skin is warm and dry.      Findings: No rash.   Neurological:      Mental Status: She is alert and oriented to person, place, and time. Mental status is at baseline.   Psychiatric:         Mood and Affect: Mood normal.         Behavior: Behavior normal.         Thought Content: Thought content normal.

## 2024-08-29 NOTE — TELEPHONE ENCOUNTER
----- Message from Merle Jaimes sent at 10/3/2017 10:35 AM CDT -----  Contact: Self [phone]  ..Medication question / problems.  SYNTHROID 100 mcg tablet    ..  [facility] [place] - [address]  [address]  [place] [address]  Phone: [phone] Fax: [phone]    If needed, pls add orders for labs prior to pt's appointment.    English

## 2024-08-31 LAB — BACTERIA UR CULT: ABNORMAL

## 2024-09-02 ENCOUNTER — PATIENT MESSAGE (OUTPATIENT)
Dept: FAMILY MEDICINE | Facility: CLINIC | Age: 59
End: 2024-09-02
Payer: COMMERCIAL

## 2024-10-16 ENCOUNTER — HOSPITAL ENCOUNTER (OUTPATIENT)
Dept: RADIOLOGY | Facility: HOSPITAL | Age: 59
Discharge: HOME | End: 2024-10-16
Attending: FAMILY MEDICINE
Payer: COMMERCIAL

## 2024-10-16 DIAGNOSIS — Z12.31 ENCOUNTER FOR SCREENING MAMMOGRAM FOR MALIGNANT NEOPLASM OF BREAST: ICD-10-CM

## 2024-10-16 PROCEDURE — 77063 BREAST TOMOSYNTHESIS BI: CPT | Mod: TC,PO

## 2024-10-16 PROCEDURE — 77067 SCR MAMMO BI INCL CAD: CPT | Mod: TC,PO

## 2025-01-08 ENCOUNTER — TELEPHONE (OUTPATIENT)
Dept: ENDOCRINOLOGY | Facility: CLINIC | Age: 60
End: 2025-01-08
Payer: COMMERCIAL

## 2025-01-08 ENCOUNTER — PATIENT MESSAGE (OUTPATIENT)
Dept: ENDOCRINOLOGY | Facility: CLINIC | Age: 60
End: 2025-01-08
Payer: COMMERCIAL

## 2025-01-08 DIAGNOSIS — G61.81 CIDP (CHRONIC INFLAMMATORY DEMYELINATING POLYNEUROPATHY): ICD-10-CM

## 2025-01-08 RX ORDER — LEVOTHYROXINE SODIUM 100 UG/1
100 TABLET ORAL
Qty: 90 TABLET | Refills: 3 | Status: SHIPPED | OUTPATIENT
Start: 2025-01-08

## 2025-01-08 RX ORDER — LEVOTHYROXINE SODIUM 100 UG/1
100 TABLET ORAL
Qty: 30 TABLET | Refills: 11 | Status: SHIPPED | OUTPATIENT
Start: 2025-01-08 | End: 2025-01-08 | Stop reason: SDUPTHER

## 2025-01-08 NOTE — TELEPHONE ENCOUNTER
----- Message from Magaly sent at 1/8/2025  1:11 PM CST -----  Regarding: Refill Request  Contact: Alina  CONSULT/ADVISORY    Name of Caller: Alina     Contact Preference: [phone] (home)       Nature of Call:  Pt states that her insurance isn't approving SYNTHROID 100 mcg tablet   She may need a medical necessity paperwork sent over to the insurance for approval. Pt would like a call back to discuss fixing the issue. No medication left

## 2025-01-08 NOTE — TELEPHONE ENCOUNTER
Spoke with pt regarding her PA for Synthroid was Denied. I informed pt that I don't have on file she has never taken generic brand Synthroid so her insurance has denied Name Brand. I informed pt we can send medication to [facility] and she can pay out of pocket monthly and it will be mailed to her home.     Pt stated she is willing to try it that way. I informed her Dr. Markham will send her a message via portal with all the information.

## 2025-01-12 ENCOUNTER — PATIENT MESSAGE (OUTPATIENT)
Dept: FAMILY MEDICINE | Facility: CLINIC | Age: 60
End: 2025-01-12
Payer: COMMERCIAL

## 2025-01-13 ENCOUNTER — ON-DEMAND VIRTUAL (OUTPATIENT)
Dept: URGENT CARE | Facility: CLINIC | Age: 60
End: 2025-01-13
Payer: COMMERCIAL

## 2025-01-13 DIAGNOSIS — H00.011 HORDEOLUM EXTERNUM OF RIGHT UPPER EYELID: Primary | ICD-10-CM

## 2025-01-13 RX ORDER — POLYMYXIN B SULFATE AND TRIMETHOPRIM 1; 10000 MG/ML; [USP'U]/ML
1 SOLUTION OPHTHALMIC EVERY 6 HOURS
Qty: 10 ML | Refills: 0 | Status: SHIPPED | OUTPATIENT
Start: 2025-01-13 | End: 2025-01-14 | Stop reason: SDUPTHER

## 2025-01-13 NOTE — PATIENT INSTRUCTIONS
Warm compresses, start prescribed eye drop.    If symptoms persist follow up with optometry.     Thank you for choosing [facility]!    Our goal in the [facility] to always provide outstanding medical care. You may receive a survey by mail or e-mail in the next week regarding your experience today. We would greatly appreciate you completing and returning the survey. Your feedback provides us with a way to recognize our staff who provide very good care, and it helps us learn how to improve when your experience was below our aspiration of excellence.         We appreciate you trusting us with your medical care. We hope you feel better soon. We will be happy to take care of you for all of your future medical needs.    You must understand that you've received Virtual  treatment only and that you may be released before all your medical problems are known or treated. You, the patient, will arrange for follow up care as instructed.    Follow up with your PCP or specialty clinic as directed in the next 1-2 weeks if not improved or as needed.  You can call [phone] to schedule an appointment with the appropriate provider.    If your condition worsens we recommend that you receive another evaluation in person, with your primary care provider, urgent care or at the emergency room immediately or contact your primary medical clinics after hours call service to discuss your concerns.

## 2025-01-13 NOTE — PROGRESS NOTES
Subjective:      Patient ID: Alina Rendon is a 59 y.o. female.    Vitals:  vitals were not taken for this visit.     Chief Complaint: Stye      Visit Type: TELE AUDIOVISUAL    Present with the patient at the time of consultation: TELEMED PRESENT WITH PATIENT: spouse at home    Past Medical History:   Diagnosis Date    Cataract     CIDP (chronic inflammatory demyelinating polyneuropathy) 2012    Coronary artery disease     Grave's disease     s/p I-131 tx and Orbitopathy    Hypothyroidism     s/p I-1 31 tx    Neuropathy     Autoimmune etiology ( CIDP)    Screening for colorectal cancer 09/03/2019     normal 6/18, 10 years    Spondylolisthesis     Vitamin D deficiency      Past Surgical History:   Procedure Laterality Date    CATARACT EXTRACTION Left 09/11/2019    Surgeon: Dr. Kaushik Womack    DECOMPRESSION OF ORBIT Bilateral 2/18/2020    Procedure: DECOMPRESSION, ORBIT;  Surgeon: Donte Mcdonough MD;  Location: [facility] OR;  Service: ENT;  Laterality: Bilateral;  FESS set-up    DILATION AND CURETTAGE OF UTERUS      ENDOMETRIAL ABLATION  10/27/2006    Thermachoice ablation    ENDOSCOPIC NASAL SEPTOPLASTY N/A 2/18/2020    Procedure: SEPTOPLASTY, NOSE, ENDOSCOPIC;  Surgeon: Donte Mcdonough MD;  Location: [facility] OR;  Service: ENT;  Laterality: N/A;  supplies available not open - possible  DISC LOADED BY REJI LUTZ ON  2-  RN Pre OP 2-    EYE SURGERY Left     cataract    fractional D&C  10/2006    with ThermaChoice ablation     FUNCTIONAL ENDOSCOPIC SINUS SURGERY (FESS) USING COMPUTER-ASSISTED NAVIGATION Bilateral 2/18/2020    Procedure: FESS, USING COMPUTER-ASSISTED NAVIGATION;  Surgeon: Donte Mcdonough MD;  Location: [facility] OR;  Service: ENT;  Laterality: Bilateral;  For approach to orbital decompression    TEMPOROMANDIBULAR JOINT SURGERY      with skin grafts from Suprapubic area     Review of patient's allergies indicates:  No Known Allergies  Current Outpatient Medications on File Prior to Visit   Medication  Sig Dispense Refill    GRALISE 600 mg Tb24 Take 3 tablets by mouth once daily.      hydrOXYzine (ATARAX) 10 MG Tab Take 1 tablet by mouth Three times daily as needed.      ibuprofen (ADVIL,MOTRIN) 600 MG tablet Take 1 tablet (600 mg total) by mouth every 6 (six) hours as needed for Pain. 30 tablet 1    IMMUNE GLOBULIN,GAMMA,IGG, (IMMUNE GLOBULIN, HUMAN,, IGG, IV) Inject into the vein every 30 days.      magnesium 30 mg Tab Take 1 tablet by mouth 2 (two) times daily.      SYNTHROID 100 mcg tablet Take 1 tablet (100 mcg total) by mouth before breakfast. 90 tablet 3     No current facility-administered medications on file prior to visit.     Family History   Problem Relation Name Age of Onset    Arthritis Mother      Thyroid disease Mother      Hyperlipidemia Mother      Diabetes Father      Hyperlipidemia Father      Breast cancer Neg Hx      Colon cancer Neg Hx      Ovarian cancer Neg Hx      Hypertension Neg Hx      Stroke Neg Hx         Medications Ordered                [facility] - [place] [id number]    [address] [facility] [address]    Telephone: [phone]   Fax: [phone]   Hours: Not open 24 hours                         E-Prescribed (1 of 1)              polymyxin B sulf-trimethoprim (POLYTRIM) 10,000 unit- 1 mg/mL Drop    Sig: Place 1 drop into the right eye every 6 (six) hours. for 10 days       Start: 1/13/25     Quantity: 10 mL Refills: 0                           Ohs Peq Odvv Intake    1/13/2025  1:10 PM CST - Filed by Patient   What is your current physical address in the event of a medical emergency?    Are you able to take your vital signs? No   Please attach any relevant images or files    Is your employer contracted with [facility]? No         Stye for 2 days with swelling on upper right lid. Has done warm compresses multiple times a day. Got OTC stye medication which did not help.         Eyes:  Positive for eye redness and eyelid swelling. Negative for eye discharge and  eye itching.        Objective:   The physical exam was conducted virtually.  Physical Exam   Eyes: Right conjunctiva is injected.           Assessment:     1. Hordeolum externum of right upper eyelid        Plan:       Hordeolum externum of right upper eyelid    Other orders  -     polymyxin B sulf-trimethoprim (POLYTRIM) 10,000 unit- 1 mg/mL Drop; Place 1 drop into the right eye every 6 (six) hours. for 10 days  Dispense: 10 mL; Refill: 0

## 2025-01-14 NOTE — TELEPHONE ENCOUNTER
----- Message from Tech Willow sent at 1/14/2025 12:31 PM CST -----  Regarding: Patient call back  .Type: Patient Call Back    Who called:self     What is the request in detail:asking if medication polymyxin B sulf-trimethoprim (POLYTRIM) 10,000 unit- 1 mg/mL drop can be sent to pharmacy listed below. Caller states current pharmacy([facility]) is out of the medication     Can the clinic reply by [facility]?no     Would the patient rather a call back or a response via [facility]? Call     Best call back number:.[phone]-asking for a call back to make sure medication will go to pharmacy below or if different medication will be sent to [facility]       Additional Information:Had a virtual visit(stye on eye)  with  Cora Stratton PA-C on 01/13/2025(unable to send a message to that provider)    [facility] [phone]   [place] [id number]  [phone]

## 2025-01-14 NOTE — TELEPHONE ENCOUNTER
Spoke with pt, had virtual UC appt, eye drop sent to [facility] they don't carry, requesting Rx sent to [facility]

## 2025-01-17 ENCOUNTER — OFFICE VISIT (OUTPATIENT)
Dept: FAMILY MEDICINE | Facility: CLINIC | Age: 60
End: 2025-01-17
Payer: COMMERCIAL

## 2025-01-17 VITALS
OXYGEN SATURATION: 97 % | DIASTOLIC BLOOD PRESSURE: 50 MMHG | TEMPERATURE: 98 F | BODY MASS INDEX: 23.01 KG/M2 | SYSTOLIC BLOOD PRESSURE: 92 MMHG | WEIGHT: 143.19 LBS | HEIGHT: 66 IN | HEART RATE: 87 BPM

## 2025-01-17 DIAGNOSIS — H00.11 CHALAZION OF RIGHT UPPER EYELID: Primary | ICD-10-CM

## 2025-01-17 PROCEDURE — 99999 PR PBB SHADOW E&M-EST. PATIENT-LVL IV: CPT | Mod: PBBFAC,,,

## 2025-01-17 PROCEDURE — 3078F DIAST BP <80 MM HG: CPT | Mod: CPTII,S$GLB,,

## 2025-01-17 PROCEDURE — 3008F BODY MASS INDEX DOCD: CPT | Mod: CPTII,S$GLB,,

## 2025-01-17 PROCEDURE — 1160F RVW MEDS BY RX/DR IN RCRD: CPT | Mod: CPTII,S$GLB,,

## 2025-01-17 PROCEDURE — 1159F MED LIST DOCD IN RCRD: CPT | Mod: CPTII,S$GLB,,

## 2025-01-17 PROCEDURE — 99214 OFFICE O/P EST MOD 30 MIN: CPT | Mod: S$GLB,,,

## 2025-01-17 PROCEDURE — 3074F SYST BP LT 130 MM HG: CPT | Mod: CPTII,S$GLB,,

## 2025-01-17 RX ORDER — OLOPATADINE HYDROCHLORIDE 1 MG/ML
1 SOLUTION/ DROPS OPHTHALMIC 2 TIMES DAILY
Qty: 5 ML | Refills: 2 | Status: SHIPPED | OUTPATIENT
Start: 2025-01-17 | End: 2026-01-17

## 2025-01-17 NOTE — PROGRESS NOTES
" Assessment & Plan     Chalazion of right upper eyelid  Assessment & Plan:  -- Saw virtual provider 4 days ago for "annoying" acute area of swelling to the upper eyelid.  Provider prescribed both doxycycline PO and Polytrim gtt for a suspected hordeolum - suspect doxycycline was prescribed to cover for preseptal or postseptal cellulitis, but note does not express this rationale.   -- Has been doing intermittent warm compresses, but has only done this a few times.  No great improvement with abx.   -- Also c/o severe pruritus, worsened by antibiotic drops  Exam:  -- Afebrile  -- There is a small approximately 7mm area of swelling to the external right eyelid.  The lesion does not appear to be infected and does not involve the follicles directly.  Not fluctuant, nontender.  -- No pain with EOM.  Extraoccular tissue is nether erythematous nor tender.   -- Declined inversion of eyelid to inspect palpebral conjunctiva  Plan:  -- Advised hot (not painfully so) compresses multiple times per day as symptoms persist  -- Continue PolyTrim drops  -- Do not feel that this benefits from oral doxycyline - okay to discontinue  -- Prescribed olopatadine eye drops for pruritus - alternate this and PolyTrim every 3 hours    Orders:  -     olopatadine (PATANOL) 0.1 % ophthalmic solution; Place 1 drop into the right eye 2 (two) times daily.  Dispense: 5 mL; Refill: 2       HPI   Alina Rendon is a 59 y.o. female with multiple medical diagnoses as listed in the medical history and problem list who presents for swelling to the right eyelid for the past 6 days    Patient presents with her .  She reports that she saw a virtual provider 4 days ago for "annoying" acute area of swelling to the upper eyelid.  Provider prescribed both doxycycline PO and Polytrim gtt for a suspected hordeolum - suspect doxycycline was prescribed to cover for preseptal or postseptal cellulitis, but note does not express this rationale.   -- Has been " doing intermittent warm compresses, but has only done this a few times.  No great improvement with abx.   -- Also c/o severe pruritus, worsened by antibiotic drops    ROS:  Patient denies any CP, SOB, fever, chills, visual changes, headaches.      Follow Up: With PCP Dr. Jaimes in 3/2025     Health maintenance reviewed   Health Maintenance         Date Due Completion Date    COVID-19 Vaccine (1) Never done ---    TETANUS VACCINE Never done ---    Shingles Vaccine (1 of 2) Never done ---    Pneumococcal Vaccines (Age 50+) (1 of 2 - PCV) Never done ---    LDCT Lung Screen Never done ---    Cervical Cancer Screening 10/19/2019 10/19/2016    Influenza Vaccine (1) Never done ---    RSV Vaccine (Age 60+ and Pregnant patients) (1 - Risk 60-74 years 1-dose series) Never done ---    Mammogram 10/16/2025 10/16/2024    Colorectal Cancer Screening 06/07/2028 6/7/2018    Lipid Panel 08/01/2029 8/1/2024                 Physical Exam   Vital signs reviewed. Afebrile  Body mass index is 23.11 kg/m².   General:  Well-developed, well-nourished. NAD.   Skin:  Warm, dry. No palmar erythema. Cap refill <2s bilaterally  Head:  NC/AT   Eyes:  Conjunctivae w/o exudates or hemorrhage.  Non-icteric sclerae.   Left eye/eyelid completely normal  -- Right sclera is red  -- There is a small approximately 7mm area of swelling to the external right eyelid.  The lesion does not appear to be infected and does not involve the follicles directly.  Not fluctuant, nontender.  Photo below  -- No pain with EOM.  Extraoccular tissue is nether erythematous nor tender, and does not raise suspicion for pre- or post-septal cellulitis  -- Declined inversion of eyelid to inspect palpebral conjunctiva  Ears:  External ears w/o swelling or erythema.  Nose:  Nares are patent bilaterally w/o rhinorrhea or epistaxis  Lungs:  CTAB without rales, rhonchi or wheezing.  Breathing comfortably on RA.  Heart:  Normal S1 & S2.  No extra heart sounds.  No pulsus  alternans.  Extremities:  Radial pulses 2+ and symmetric.  Neuro:  A&O4.  No obvious focal deficits. Negative Lundberg's sign.    Psychiatric:  Appropriate affect.          History     Past Medical History:  Past Medical History:   Diagnosis Date    Cataract     CIDP (chronic inflammatory demyelinating polyneuropathy) 2012    Coronary artery disease     Grave's disease     s/p I-131 tx and Orbitopathy    Hypothyroidism     s/p I-1 31 tx    Neuropathy     Autoimmune etiology ( CIDP)    Screening for colorectal cancer 09/03/2019     normal 6/18, 10 years    Spondylolisthesis     Vitamin D deficiency        Past Surgical History:  Past Surgical History:   Procedure Laterality Date    CATARACT EXTRACTION Left 09/11/2019    Surgeon: Dr. Kaushik Womack    DECOMPRESSION OF ORBIT Bilateral 2/18/2020    Procedure: DECOMPRESSION, ORBIT;  Surgeon: Donte Mcdonough MD;  Location: [facility] OR;  Service: ENT;  Laterality: Bilateral;  FESS set-up    DILATION AND CURETTAGE OF UTERUS      ENDOMETRIAL ABLATION  10/27/2006    Thermachoice ablation    ENDOSCOPIC NASAL SEPTOPLASTY N/A 2/18/2020    Procedure: SEPTOPLASTY, NOSE, ENDOSCOPIC;  Surgeon: Donte Mcdonough MD;  Location: [facility] OR;  Service: ENT;  Laterality: N/A;  supplies available not open - possible  DISC LOADED BY REJI LUTZ ON  2-  RN Pre OP 2-    EYE SURGERY Left     cataract    fractional D&C  10/2006    with ThermaChoice ablation     FUNCTIONAL ENDOSCOPIC SINUS SURGERY (FESS) USING COMPUTER-ASSISTED NAVIGATION Bilateral 2/18/2020    Procedure: FESS, USING COMPUTER-ASSISTED NAVIGATION;  Surgeon: Donte Mcdonough MD;  Location: [facility] OR;  Service: ENT;  Laterality: Bilateral;  For approach to orbital decompression    TEMPOROMANDIBULAR JOINT SURGERY      with skin grafts from Suprapubic area       Social History:  Social History     Socioeconomic History    Marital status:    Tobacco Use    Smoking status: Former     Current packs/day: 0.00     Average packs/day:  1 pack/day for 30.0 years (30.0 ttl pk-yrs)     Types: Cigarettes     Start date: 1980     Quit date: 2010     Years since quittin.1    Smokeless tobacco: Never   Substance and Sexual Activity    Alcohol use: Yes     Comment: rarely    Drug use: Never    Sexual activity: Yes     Partners: Male     Birth control/protection: None     Comment:  since : spouse: Gustavo     Social Drivers of Health     Financial Resource Strain: Low Risk  (2024)    Overall Financial Resource Strain (CARDIA)     Difficulty of Paying Living Expenses: Not very hard   Food Insecurity: Patient Declined (2024)    Hunger Vital Sign     Worried About Running Out of Food in the Last Year: Patient declined     Ran Out of Food in the Last Year: Patient declined   Physical Activity: Unknown (2024)    Exercise Vital Sign     Days of Exercise per Week: Patient declined   Stress: No Stress Concern Present (2024)    [employer] - Occupational Stress Questionnaire     Feeling of Stress : Only a little   Housing Stability: Unknown (2024)    Housing Stability Vital Sign     Unable to Pay for Housing in the Last Year: Patient declined       Family History:  Family History   Problem Relation Name Age of Onset    Arthritis Mother      Thyroid disease Mother      Hyperlipidemia Mother      Diabetes Father      Hyperlipidemia Father      Breast cancer Neg Hx      Colon cancer Neg Hx      Ovarian cancer Neg Hx      Hypertension Neg Hx      Stroke Neg Hx         Allergies and Medications: (updated and reviewed)  Review of patient's allergies indicates:   Allergen Reactions    Adhesive Rash     Current Outpatient Medications   Medication Sig Dispense Refill    GRALISE 600 mg Tb24 Take 3 tablets by mouth once daily.      hydrOXYzine (ATARAX) 10 MG Tab Take 1 tablet by mouth Three times daily as needed.      ibuprofen (ADVIL,MOTRIN) 600 MG tablet Take 1 tablet (600 mg total) by mouth every 6  (six) hours as needed for Pain. 30 tablet 1    IMMUNE GLOBULIN,GAMMA,IGG, (IMMUNE GLOBULIN, HUMAN,, IGG, IV) Inject into the vein every 30 days.      magnesium 30 mg Tab Take 1 tablet by mouth 2 (two) times daily.      SYNTHROID 100 mcg tablet Take 1 tablet (100 mcg total) by mouth before breakfast. 90 tablet 3    olopatadine (PATANOL) 0.1 % ophthalmic solution Place 1 drop into the right eye 2 (two) times daily. 5 mL 2    polymyxin B sulf-trimethoprim (POLYTRIM) 10,000 unit- 1 mg/mL Drop Place 1 drop into the right eye every 6 (six) hours. for 10 days 10 mL 0     No current facility-administered medications for this visit.       Patient Care Team:  Maribel Jaimes DO as PCP - General (Family Medicine)  Arlyn Jimenez LPN as Licensed Practical Nurse        Lenny Ace PA-C  [facility]  [facility]             - The patient is given an After Visit Summary that lists all medications with directions, allergies, education, orders placed during this encounter and follow-up instructions.      - I have reviewed the patient's medical information including past medical, family, and social history sections including the medications and allergies.      - We discussed the patient's current medications.     This note was created by combination of typed  and MModal dictation.  Transcription errors may be present.  If there are any questions, please contact me.

## 2025-01-17 NOTE — ASSESSMENT & PLAN NOTE
"-- Saw virtual provider 4 days ago for "annoying" acute area of swelling to the upper eyelid.  Provider prescribed both doxycycline PO and Polytrim gtt for a suspected hordeolum - suspect doxycycline was prescribed to cover for preseptal or postseptal cellulitis, but note does not indicate.   -- Has been doing intermittent warm compresses, but has only done this a few times.  No great improvement with abx.   -- Also c/o severe pruritus, worsened by antibiotic drops  Exam:  -- Afebrile  -- There is a small approximately 7mm area of swelling to the external right eyelid.  The lesion does not appear to be infected and does not involve the follicles directly.  Not fluctuant, nontender.  -- No pain with EOM.  Extraoccular tissue is nether erythematous nor tender.   -- Declined inversion of eyelid to inspect palpebral conjunctiva  Plan:  -- Advised hot (not painfully so) compresses multiple times per day as symptoms persist  -- Continue PolyTrim drops  -- Do not feel that this benefits from oral doxycyline - okay to discontinue  -- Prescribed olopatadine eye drops for pruritus - alternate this and PolyTrim every 3 hours  "

## 2025-01-17 NOTE — PATIENT INSTRUCTIONS
Plan:  -- Do not feel that this benefits from oral doxycyline - okay to discontinue  -- Advised hot (not painfully so) compresses multiple times per day as symptoms persist  -- Continue PolyTrim drops as prescribed  -- Also prescribed olopatadine eye drops today - alternate this and PolyTrim every 3 hours    Look into [facility] pharmacy    Please reach out or visit the ED if symptoms do not improve or worsen

## 2025-01-21 RX ORDER — POLYMYXIN B SULFATE AND TRIMETHOPRIM 1; 10000 MG/ML; [USP'U]/ML
1 SOLUTION OPHTHALMIC EVERY 6 HOURS
Qty: 10 ML | Refills: 0 | Status: SHIPPED | OUTPATIENT
Start: 2025-01-21 | End: 2025-01-31

## 2025-01-23 ENCOUNTER — PATIENT OUTREACH (OUTPATIENT)
Dept: ADMINISTRATIVE | Facility: HOSPITAL | Age: 60
End: 2025-01-23
Payer: COMMERCIAL

## 2025-03-17 ENCOUNTER — OFFICE VISIT (OUTPATIENT)
Dept: FAMILY MEDICINE | Facility: CLINIC | Age: 60
End: 2025-03-17
Payer: COMMERCIAL

## 2025-03-17 VITALS
WEIGHT: 141.13 LBS | OXYGEN SATURATION: 97 % | SYSTOLIC BLOOD PRESSURE: 102 MMHG | HEIGHT: 66 IN | BODY MASS INDEX: 22.68 KG/M2 | HEART RATE: 96 BPM | DIASTOLIC BLOOD PRESSURE: 70 MMHG | TEMPERATURE: 98 F

## 2025-03-17 DIAGNOSIS — E89.0 POSTABLATIVE HYPOTHYROIDISM: ICD-10-CM

## 2025-03-17 DIAGNOSIS — G61.81 CIDP (CHRONIC INFLAMMATORY DEMYELINATING POLYNEUROPATHY): ICD-10-CM

## 2025-03-17 DIAGNOSIS — R10.13 DYSPEPSIA: Primary | ICD-10-CM

## 2025-03-17 DIAGNOSIS — E78.5 HYPERLIPIDEMIA, UNSPECIFIED HYPERLIPIDEMIA TYPE: ICD-10-CM

## 2025-03-17 DIAGNOSIS — E05.00 GRAVES DISEASE: ICD-10-CM

## 2025-03-17 PROCEDURE — 3074F SYST BP LT 130 MM HG: CPT | Mod: CPTII,S$GLB,, | Performed by: FAMILY MEDICINE

## 2025-03-17 PROCEDURE — 3008F BODY MASS INDEX DOCD: CPT | Mod: CPTII,S$GLB,, | Performed by: FAMILY MEDICINE

## 2025-03-17 PROCEDURE — 99999 PR PBB SHADOW E&M-EST. PATIENT-LVL III: CPT | Mod: PBBFAC,,, | Performed by: FAMILY MEDICINE

## 2025-03-17 PROCEDURE — 99213 OFFICE O/P EST LOW 20 MIN: CPT | Mod: S$GLB,,, | Performed by: FAMILY MEDICINE

## 2025-03-17 PROCEDURE — 3078F DIAST BP <80 MM HG: CPT | Mod: CPTII,S$GLB,, | Performed by: FAMILY MEDICINE

## 2025-03-17 NOTE — PROGRESS NOTES
"Assessment & Plan:    Dyspepsia  Reassured patient that she does not have a fish allergy. Her symptoms are more consistent with dyspepsia. How the fish is prepared may be causing GI discomfort. May try OTC PPI like Prilosec and would ideally try to avoid eating common dietary triggers.    Hyperlipidemia, unspecified hyperlipidemia type  -     Lipid Panel; Future; Expected date: 03/17/2025  -     Hemoglobin A1C; Future; Expected date: 03/17/2025    CIDP (chronic inflammatory demyelinating polyneuropathy)  -     CBC Auto Differential; Future; Expected date: 03/17/2025    Due for labs before annual in August.    Graves disease  Postablative hypothyroidism  Thyroid function is being monitored by her specialist.       Health maintenance reviewed:  -Defers LDCT to next visit  -Declines Pap Smear       Follow-up: Follow up in about 5 months (around 8/17/2025) for an annual.  ______________________________________________________________________    Chief Complaint  Chief Complaint   Patient presents with    Health Maintenance       HPI  Alina Rendon is a 59 y.o. female with medical diagnoses as listed in the medical history and problem list that was scheduled for a Pap Smear today but she declines this. She states that she believes she has an allergy to fish. She develops chest discomfort, "worse than heartburn" when she eats fried or baked fish. Tolerates other seafood, such as crawfish, oysters, etc. Tried Pepcid, Tums, and Pepto Bismol without relief.     Health Maintenance         Date Due Completion Date    COVID-19 Vaccine (1) Never done ---    TETANUS VACCINE Never done ---    Shingles Vaccine (1 of 2) Never done ---    Pneumococcal Vaccines (Age 50+) (1 of 2 - PCV) Never done ---    LDCT Lung Screen Never done ---    Cervical Cancer Screening 10/19/2019 10/19/2016    Influenza Vaccine (1) Never done ---    RSV Vaccine (Age 60+ and Pregnant patients) (1 - Risk 60-74 years 1-dose series) Never done ---    " Mammogram 10/16/2025 10/16/2024    Colorectal Cancer Screening 06/07/2028 6/7/2018    Lipid Panel 08/01/2029 8/1/2024              PAST MEDICAL HISTORY:  Past Medical History:   Diagnosis Date    Cataract     CIDP (chronic inflammatory demyelinating polyneuropathy) 2012    Coronary artery disease     Grave's disease     s/p I-131 tx and Orbitopathy    Hypothyroidism     s/p I-1 31 tx    Neuropathy     Autoimmune etiology ( CIDP)    Screening for colorectal cancer 09/03/2019     normal 6/18, 10 years    Spondylolisthesis     Vitamin D deficiency        PAST SURGICAL HISTORY:  Past Surgical History:   Procedure Laterality Date    CATARACT EXTRACTION Left 09/11/2019    Surgeon: Dr. Kaushik Womack    DECOMPRESSION OF ORBIT Bilateral 2/18/2020    Procedure: DECOMPRESSION, ORBIT;  Surgeon: Donte Mcdonough MD;  Location: [facility] OR;  Service: ENT;  Laterality: Bilateral;  FESS set-up    DILATION AND CURETTAGE OF UTERUS      ENDOMETRIAL ABLATION  10/27/2006    Thermachoice ablation    ENDOSCOPIC NASAL SEPTOPLASTY N/A 2/18/2020    Procedure: SEPTOPLASTY, NOSE, ENDOSCOPIC;  Surgeon: Donte Mcdonough MD;  Location: [facility] OR;  Service: ENT;  Laterality: N/A;  supplies available not open - possible  DISC LOADED BY REJI LUTZ ON  2-  RN Pre OP 2-    EYE SURGERY Left     cataract    fractional D&C  10/2006    with ThermaChoice ablation     FUNCTIONAL ENDOSCOPIC SINUS SURGERY (FESS) USING COMPUTER-ASSISTED NAVIGATION Bilateral 2/18/2020    Procedure: FESS, USING COMPUTER-ASSISTED NAVIGATION;  Surgeon: Donte Mcdonough MD;  Location: [facility] OR;  Service: ENT;  Laterality: Bilateral;  For approach to orbital decompression    TEMPOROMANDIBULAR JOINT SURGERY      with skin grafts from Suprapubic area       SOCIAL HISTORY:  Social History[1]    FAMILY HISTORY:  Family History   Problem Relation Name Age of Onset    Arthritis Mother      Thyroid disease Mother      Hyperlipidemia Mother      Diabetes Father      Hyperlipidemia Father  "     Breast cancer Neg Hx      Colon cancer Neg Hx      Ovarian cancer Neg Hx      Hypertension Neg Hx      Stroke Neg Hx         ALLERGIES AND MEDICATIONS: updated and reviewed.  Review of patient's allergies indicates:   Allergen Reactions    Adhesive Rash     Current Medications[2]      ROS  Review of Systems   Cardiovascular:  Positive for chest pain.           Physical Exam  Vitals:    25 1420   BP: 102/70   Pulse: 96   Temp: 98.2 °F (36.8 °C)   TempSrc: Oral   SpO2: 97%   Weight: 64 kg (141 lb 1.5 oz)   Height: 5' 6" (1.676 m)    Body mass index is 22.77 kg/m².  Weight: 64 kg (141 lb 1.5 oz)   Height: 5' 6" (167.6 cm)   Physical Exam  Constitutional:       General: She is not in acute distress.     Appearance: Normal appearance.   HENT:      Head: Normocephalic and atraumatic.   Neurological:      Mental Status: She is alert. Mental status is at baseline.   Psychiatric:         Mood and Affect: Mood normal.         Behavior: Behavior normal.                  [1]   Social History  Socioeconomic History    Marital status:    Tobacco Use    Smoking status: Former     Current packs/day: 0.00     Average packs/day: 1 pack/day for 30.0 years (30.0 ttl pk-yrs)     Types: Cigarettes     Start date: 1980     Quit date: 2010     Years since quittin.3    Smokeless tobacco: Never   Substance and Sexual Activity    Alcohol use: Yes     Comment: rarely    Drug use: Never    Sexual activity: Yes     Partners: Male     Birth control/protection: None     Comment:  since : spouse: Shan     Social Drivers of Health     Financial Resource Strain: Low Risk  (2024)    Overall Financial Resource Strain (CARDIA)     Difficulty of Paying Living Expenses: Not very hard   Food Insecurity: Patient Declined (2024)    Hunger Vital Sign     Worried About Running Out of Food in the Last Year: Patient declined     Ran Out of Food in the Last Year: Patient declined   Physical Activity: Unknown " (8/7/2024)    Exercise Vital Sign     Days of Exercise per Week: Patient declined   Stress: No Stress Concern Present (8/7/2024)    [employer] - Occupational Stress Questionnaire     Feeling of Stress : Only a little   Housing Stability: Unknown (8/7/2024)    Housing Stability Vital Sign     Unable to Pay for Housing in the Last Year: Patient declined   [2]   Current Outpatient Medications   Medication Sig Dispense Refill    GRALISE 600 mg Tb24 Take 3 tablets by mouth once daily.      hydrOXYzine (ATARAX) 10 MG Tab Take 1 tablet by mouth Three times daily as needed.      ibuprofen (ADVIL,MOTRIN) 600 MG tablet Take 1 tablet (600 mg total) by mouth every 6 (six) hours as needed for Pain. 30 tablet 1    IMMUNE GLOBULIN,GAMMA,IGG, (IMMUNE GLOBULIN, HUMAN,, IGG, IV) Inject into the vein every 30 days.      magnesium 30 mg Tab Take 1 tablet by mouth 2 (two) times daily.      olopatadine (PATANOL) 0.1 % ophthalmic solution Place 1 drop into the right eye 2 (two) times daily. 5 mL 2    SYNTHROID 100 mcg tablet Take 1 tablet (100 mcg total) by mouth before breakfast. 90 tablet 3     No current facility-administered medications for this visit.

## 2025-05-13 ENCOUNTER — OFFICE VISIT (OUTPATIENT)
Dept: OPTOMETRY | Facility: CLINIC | Age: 60
End: 2025-05-13
Payer: MEDICARE

## 2025-05-13 DIAGNOSIS — H53.2 DIPLOPIA: ICD-10-CM

## 2025-05-13 DIAGNOSIS — H43.821 VITREOMACULAR ADHESION, RIGHT: ICD-10-CM

## 2025-05-13 DIAGNOSIS — H52.7 REFRACTIVE ERROR: ICD-10-CM

## 2025-05-13 DIAGNOSIS — H54.3 LOW VISION, BOTH EYES: ICD-10-CM

## 2025-05-13 DIAGNOSIS — H25.11 NUCLEAR SCLEROSIS OF RIGHT EYE: Primary | ICD-10-CM

## 2025-05-13 PROCEDURE — 99213 OFFICE O/P EST LOW 20 MIN: CPT | Mod: S$PBB,,, | Performed by: OPTOMETRIST

## 2025-05-13 PROCEDURE — 99999 PR PBB SHADOW E&M-EST. PATIENT-LVL III: CPT | Mod: PBBFAC,,, | Performed by: OPTOMETRIST

## 2025-05-13 PROCEDURE — 92015 DETERMINE REFRACTIVE STATE: CPT | Mod: ,,, | Performed by: OPTOMETRIST

## 2025-05-13 PROCEDURE — 99213 OFFICE O/P EST LOW 20 MIN: CPT | Mod: PBBFAC,PO | Performed by: OPTOMETRIST

## 2025-05-13 NOTE — PROGRESS NOTES
HPI    JOBY: 2/27/2024 - Dr. Sandoval  Blur ou at dist, x mos, no assoc pain or red, no relief over time,   constant  CC: Pt is here today for a annual low vision exam. Pt states that her   vision is not as clear. Pt states that she is interested in strab sx so   she no longer needs prism.     (-)Dryness  (-)Burning  (-)Itchiness  (-)Tearing  (+)Flashes  (+)Floaters   (-)Photophobia  (+)Eye Pain - occasionally       Diabetic: no    Contact Lens: no    Eye Meds: none    Last edited by Elijah Sandoval, OD on 5/13/2025 11:35 AM.            Assessment /Plan     For exam results, see Encounter Report.    Nuclear sclerosis of right eye    Diplopia    Low vision, both eyes    Refractive error    Vitreomacular adhesion, right      Educated pt on presence of cataracts and effects on vision. No surgery at this time. Recheck in one year.  2. Pt wants consult for strab surgery to eliminate prism, stable with prism correction x 1 year  3,4. New Spectacle Rx given, discussed different options for glasses. RTC 1 year routine eye exam.  5. Keep appt with Faviola

## 2025-05-19 ENCOUNTER — TELEPHONE (OUTPATIENT)
Dept: OPTOMETRY | Facility: CLINIC | Age: 60
End: 2025-05-19
Payer: COMMERCIAL

## 2025-05-19 NOTE — TELEPHONE ENCOUNTER
----- Message from Brooklynluis sent at 5/19/2025  8:10 AM CDT -----  Contact: [phone]  [email or web address] Patient is calling for Medical Advice regarding: Pt said she needs a call back about a referral How long has patient had these symptoms:Pharmacy name and phone#:Patient wants a call back or thru [facility]: call back Comments:Please advise patient replies from provider may take up to 48 hours.

## 2025-06-19 ENCOUNTER — OFFICE VISIT (OUTPATIENT)
Dept: OPHTHALMOLOGY | Facility: CLINIC | Age: 60
End: 2025-06-19
Payer: MEDICARE

## 2025-06-19 ENCOUNTER — CLINICAL SUPPORT (OUTPATIENT)
Dept: OPHTHALMOLOGY | Facility: CLINIC | Age: 60
End: 2025-06-19
Payer: COMMERCIAL

## 2025-06-19 DIAGNOSIS — H16.213 EXPOSURE KERATOPATHY, BILATERAL: ICD-10-CM

## 2025-06-19 DIAGNOSIS — E05.00 GRAVES' ORBITOPATHY: ICD-10-CM

## 2025-06-19 DIAGNOSIS — H43.821 VITREOMACULAR ADHESION, RIGHT: ICD-10-CM

## 2025-06-19 DIAGNOSIS — H47.20 OPTIC ATROPHY, BOTH EYES: ICD-10-CM

## 2025-06-19 DIAGNOSIS — H25.11 AGE-RELATED NUCLEAR CATARACT, RIGHT: ICD-10-CM

## 2025-06-19 DIAGNOSIS — Z96.1 PSEUDOPHAKIA, LEFT EYE: ICD-10-CM

## 2025-06-19 DIAGNOSIS — H43.812 POSTERIOR VITREOUS DETACHMENT, LEFT: Primary | ICD-10-CM

## 2025-06-19 DIAGNOSIS — H53.2 DIPLOPIA: ICD-10-CM

## 2025-06-19 PROCEDURE — 99213 OFFICE O/P EST LOW 20 MIN: CPT | Mod: PBBFAC | Performed by: OPHTHALMOLOGY

## 2025-06-19 PROCEDURE — 99999 PR PBB SHADOW E&M-EST. PATIENT-LVL III: CPT | Mod: PBBFAC,,, | Performed by: OPHTHALMOLOGY

## 2025-06-19 PROCEDURE — 92202 OPSCPY EXTND ON/MAC DRAW: CPT | Mod: 59,PBBFAC | Performed by: OPHTHALMOLOGY

## 2025-06-19 PROCEDURE — 92134 CPTRZ OPH DX IMG PST SGM RTA: CPT | Mod: PBBFAC | Performed by: OPHTHALMOLOGY

## 2025-06-19 NOTE — PROGRESS NOTES
HPI    Pt. Presents for 12 month F/U.    Pt. States VA is stable, but no major changes overall. Pt. Did get new rx   from Dr. Sandoval recently. Seeing Dr. Rodriguez soon for prism evaluation   for glasses Pt. Has occasional floaters in OS.  Denies FOL/Pain.  Eye med: None       Last edited by Rick Martinez MA on 6/19/2025 10:16 AM.         A/P    ICD-10-CM ICD-9-CM   1. Posterior vitreous detachment, left  H43.812 379.21   2. Vitreomacular adhesion, right  H43.821 379.27   3. Graves' orbitopathy  E05.00 242.00     376.21   4. Exposure keratopathy, bilateral  H16.213 370.34   5. Optic atrophy, both eyes  H47.20 377.10   6. Age-related nuclear cataract, right  H25.11 366.16   7. Pseudophakia, left eye  Z96.1 V43.1   8. Diplopia  H53.2 368.2         1. Posterior vitreous detachment, left  Here for floater f/u    Pt feels symptoms better    Today has PVD OS, VMA OD, no RT/RD   Plan: Observation   Pathology of PVD, Retinal Tear, Retinal Detachment reviewed in great detail  RD precautions discussed in detail, patient expressed understanding  RTC immediately PRN (especially ANY change flashes, floaters, vision, visual field)     2. Vitreomacular adhesion, right  Mild VMA, no RT/RD  Plan: Observation     3. Graves' orbitopathy  4. Exposure keratopathy, bilateral  5. Optic atrophy, both eyes  F/b Dr Hough  Plan: Continue Present Management     6. Pseudophakia, left eye  Good lens position  Plan: Observation     7. Age-related nuclear cataract, right  Mod NS, borderline VS  Plan: Observation for now     8. Diplopia  Has appt with Michael coming up  Plan: Continue Present Management       RTC Faviola 12 mo DFE/OCTm OU       I saw and examined the patient and reviewed in detail the findings documented. The final examination findings, image interpretations which have been independently interpreted, and plan as documented in the record represent my personal judgment and conclusions.    Neri Salmeron MD  Vitreoretinal Surgery    [facility]

## 2025-07-28 ENCOUNTER — OFFICE VISIT (OUTPATIENT)
Dept: OPHTHALMOLOGY | Facility: CLINIC | Age: 60
End: 2025-07-28
Payer: MEDICARE

## 2025-07-28 DIAGNOSIS — H57.89 THYROID EYE DISEASE: Primary | ICD-10-CM

## 2025-07-28 DIAGNOSIS — H50.21 HYPERTROPIA OF RIGHT EYE: ICD-10-CM

## 2025-07-28 DIAGNOSIS — E07.9 THYROID EYE DISEASE: Primary | ICD-10-CM

## 2025-07-28 DIAGNOSIS — H50.00 ESOTROPIA: ICD-10-CM

## 2025-07-28 PROCEDURE — 99214 OFFICE O/P EST MOD 30 MIN: CPT | Mod: S$PBB,,, | Performed by: STUDENT IN AN ORGANIZED HEALTH CARE EDUCATION/TRAINING PROGRAM

## 2025-07-28 PROCEDURE — 92060 SENSORIMOTOR EXAMINATION: CPT | Mod: PBBFAC | Performed by: STUDENT IN AN ORGANIZED HEALTH CARE EDUCATION/TRAINING PROGRAM

## 2025-07-28 PROCEDURE — 99999 PR PBB SHADOW E&M-EST. PATIENT-LVL III: CPT | Mod: PBBFAC,,, | Performed by: STUDENT IN AN ORGANIZED HEALTH CARE EDUCATION/TRAINING PROGRAM

## 2025-07-28 PROCEDURE — 99213 OFFICE O/P EST LOW 20 MIN: CPT | Mod: PBBFAC | Performed by: STUDENT IN AN ORGANIZED HEALTH CARE EDUCATION/TRAINING PROGRAM

## 2025-07-28 PROCEDURE — 92060 SENSORIMOTOR EXAMINATION: CPT | Mod: 26,S$PBB,, | Performed by: STUDENT IN AN ORGANIZED HEALTH CARE EDUCATION/TRAINING PROGRAM

## 2025-07-28 NOTE — PROGRESS NOTES
HPI    Pt is referred by Dr. Sandoval for a Strabismus Eval.  She reports for the past 2 year or so she has been experiencing doubled   vision. Dr. Sandoval prescribed prisms which do help the double. Her   right eye does cross inward. She did not have Strabismus as a child. She   has Grave's Disease which they believe is the cause of the misalignment   and double vision.    No Current Eye Meds.  Last edited by Sukhdeep Lui on 7/28/2025  9:50 AM.        ROS    Negative for: Constitutional, Gastrointestinal, Neurological, Skin,   Genitourinary, Musculoskeletal, HENT, Endocrine, Cardiovascular, Eyes,   Respiratory, Psychiatric, Allergic/Imm, Heme/Lymph  Last edited by Antonio Rodriguez MD on 7/28/2025 11:50 AM.        Assessment /Plan     For exam results, see Encounter Report.    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  S/p Tepezza infusion course 2021 7/28/25:  Patient repros diplopia for 2 years  Has been managd with large vertical prism  Exam today shows large right hypertropia / left hypotropia with poor upgaze left eye as well as small esotropia with grossly normal abduction    Plan:  Patient would benefit from eye muscle surgery  Discussed risks and benefits of strabismus surgery  Benefits include: realign eyes and improved binocularity  Risks include: Pain, bleeding infection, transient or permanent redness, less attractive appearance, decreased vision, loss of vision, undercorrection, overcorrection, double vision, need for future surgery    Patient elects to proceed  Tent[facility] for [facility]    Problem List Items Addressed This Visit          Ophtho    Thyroid eye disease - Primary     Other Visit Diagnoses         Hypertropia of right eye          Esotropia               Antonio Rodriguez MD  Pediatric Ophthalmology  and Adult Strabismus  [facility]

## 2025-07-29 ENCOUNTER — TELEPHONE (OUTPATIENT)
Dept: OPHTHALMOLOGY | Facility: CLINIC | Age: 60
End: 2025-07-29
Payer: COMMERCIAL

## 2025-07-29 ENCOUNTER — TELEPHONE (OUTPATIENT)
Dept: FAMILY MEDICINE | Facility: CLINIC | Age: 60
End: 2025-07-29
Payer: COMMERCIAL

## 2025-07-29 DIAGNOSIS — E07.9 THYROID EYE DISEASE: Primary | ICD-10-CM

## 2025-07-29 DIAGNOSIS — H57.89 THYROID EYE DISEASE: Primary | ICD-10-CM

## 2025-07-29 NOTE — TELEPHONE ENCOUNTER
----- Message from Med Assistant Paulson sent at 7/29/2025  3:03 PM CDT -----  Regarding: surgery clearance  Patient is scheduled for Strabismus surgery on 08/20/2025 with Dr. Antonio Rodriguez and will be done under general anesthesia. Patient will need to be cleared for surgery either by chart or please schedule an appointment for patient accordingly. Thank you for your assistance.

## 2025-08-05 ENCOUNTER — PATIENT MESSAGE (OUTPATIENT)
Dept: FAMILY MEDICINE | Facility: CLINIC | Age: 60
End: 2025-08-05
Payer: COMMERCIAL

## 2025-08-05 ENCOUNTER — TELEPHONE (OUTPATIENT)
Dept: FAMILY MEDICINE | Facility: CLINIC | Age: 60
End: 2025-08-05
Payer: COMMERCIAL

## 2025-08-05 DIAGNOSIS — Z86.39 HX OF GRAVES' DISEASE: Primary | ICD-10-CM

## 2025-08-05 NOTE — TELEPHONE ENCOUNTER
Copied from CRM #[id number]. Topic: General Inquiry - Patient Advice  >> Aug 5, 2025  3:55 PM Vicki wrote:  .Type: Lab    Caller is requesting to schedule their Lab appointment prior to annual appointment.    Order is not listed in EPIC.  Please enter order and contact patient to schedule.    Name of Caller:self     Preferred Date and Time of Labs: 08/11/25    Date of EPP Appointment: asked for her TSH, T4 added to her already existing labs for 08/11/25    Where would they like the lab performed?[facility]     Would the patient rather a call back or a response via [facility]? Call     Best Call Back Number: .[phone]      Additional Information: asked for a call back to know when it has been added

## 2025-08-11 ENCOUNTER — OFFICE VISIT (OUTPATIENT)
Dept: FAMILY MEDICINE | Facility: CLINIC | Age: 60
End: 2025-08-11
Payer: COMMERCIAL

## 2025-08-11 ENCOUNTER — LAB VISIT (OUTPATIENT)
Dept: LAB | Facility: HOSPITAL | Age: 60
End: 2025-08-11
Attending: FAMILY MEDICINE
Payer: COMMERCIAL

## 2025-08-11 VITALS
SYSTOLIC BLOOD PRESSURE: 104 MMHG | OXYGEN SATURATION: 99 % | BODY MASS INDEX: 22.23 KG/M2 | DIASTOLIC BLOOD PRESSURE: 70 MMHG | WEIGHT: 138.31 LBS | HEART RATE: 73 BPM | HEIGHT: 66 IN | TEMPERATURE: 99 F

## 2025-08-11 DIAGNOSIS — Z01.818 PRE-OP EXAM: Primary | ICD-10-CM

## 2025-08-11 DIAGNOSIS — E78.5 HYPERLIPIDEMIA, UNSPECIFIED HYPERLIPIDEMIA TYPE: ICD-10-CM

## 2025-08-11 DIAGNOSIS — G61.81 CIDP (CHRONIC INFLAMMATORY DEMYELINATING POLYNEUROPATHY): ICD-10-CM

## 2025-08-11 DIAGNOSIS — Z86.39 HX OF GRAVES' DISEASE: ICD-10-CM

## 2025-08-11 LAB
ABSOLUTE EOSINOPHIL (OHS): 0.13 K/UL
ABSOLUTE MONOCYTE (OHS): 0.69 K/UL (ref 0.3–1)
ABSOLUTE NEUTROPHIL COUNT (OHS): 4.2 K/UL (ref 1.8–7.7)
BASOPHILS # BLD AUTO: 0.06 K/UL
BASOPHILS NFR BLD AUTO: 1 %
CHOLEST SERPL-MCNC: 189 MG/DL (ref 120–199)
CHOLEST/HDLC SERPL: 3.4 {RATIO} (ref 2–5)
EAG (OHS): 105 MG/DL (ref 68–131)
ERYTHROCYTE [DISTWIDTH] IN BLOOD BY AUTOMATED COUNT: 15.5 % (ref 11.5–14.5)
HBA1C MFR BLD: 5.3 % (ref 4–5.6)
HCT VFR BLD AUTO: 41 % (ref 37–48.5)
HDLC SERPL-MCNC: 56 MG/DL (ref 40–75)
HDLC SERPL: 29.6 % (ref 20–50)
HGB BLD-MCNC: 13.1 GM/DL (ref 12–16)
IMM GRANULOCYTES # BLD AUTO: 0.02 K/UL (ref 0–0.04)
IMM GRANULOCYTES NFR BLD AUTO: 0.3 % (ref 0–0.5)
LDLC SERPL CALC-MCNC: 121.4 MG/DL (ref 63–159)
LYMPHOCYTES # BLD AUTO: 1.21 K/UL (ref 1–4.8)
MCH RBC QN AUTO: 28.7 PG (ref 27–31)
MCHC RBC AUTO-ENTMCNC: 32 G/DL (ref 32–36)
MCV RBC AUTO: 90 FL (ref 82–98)
NONHDLC SERPL-MCNC: 133 MG/DL
NUCLEATED RBC (/100WBC) (OHS): 0 /100 WBC
OHS QRS DURATION: 90 MS
OHS QTC CALCULATION: 486 MS
PLATELET # BLD AUTO: 221 K/UL (ref 150–450)
PMV BLD AUTO: 9.9 FL (ref 9.2–12.9)
RBC # BLD AUTO: 4.57 M/UL (ref 4–5.4)
RELATIVE EOSINOPHIL (OHS): 2.1 %
RELATIVE LYMPHOCYTE (OHS): 19.2 % (ref 18–48)
RELATIVE MONOCYTE (OHS): 10.9 % (ref 4–15)
RELATIVE NEUTROPHIL (OHS): 66.5 % (ref 38–73)
T4 FREE SERPL-MCNC: 1.27 NG/DL (ref 0.71–1.51)
TRIGL SERPL-MCNC: 58 MG/DL (ref 30–150)
TSH SERPL-ACNC: 0.47 UIU/ML (ref 0.4–4)
WBC # BLD AUTO: 6.31 K/UL (ref 3.9–12.7)

## 2025-08-11 PROCEDURE — 3074F SYST BP LT 130 MM HG: CPT | Mod: CPTII,S$GLB,,

## 2025-08-11 PROCEDURE — 1160F RVW MEDS BY RX/DR IN RCRD: CPT | Mod: CPTII,S$GLB,,

## 2025-08-11 PROCEDURE — 3008F BODY MASS INDEX DOCD: CPT | Mod: CPTII,S$GLB,,

## 2025-08-11 PROCEDURE — 1159F MED LIST DOCD IN RCRD: CPT | Mod: CPTII,S$GLB,,

## 2025-08-11 PROCEDURE — 3078F DIAST BP <80 MM HG: CPT | Mod: CPTII,S$GLB,,

## 2025-08-11 PROCEDURE — 80061 LIPID PANEL: CPT

## 2025-08-11 PROCEDURE — 84439 ASSAY OF FREE THYROXINE: CPT

## 2025-08-11 PROCEDURE — 36415 COLL VENOUS BLD VENIPUNCTURE: CPT | Mod: PO

## 2025-08-11 PROCEDURE — 99999 PR PBB SHADOW E&M-EST. PATIENT-LVL IV: CPT | Mod: PBBFAC,,,

## 2025-08-11 PROCEDURE — 3044F HG A1C LEVEL LT 7.0%: CPT | Mod: CPTII,S$GLB,,

## 2025-08-11 PROCEDURE — 93010 ELECTROCARDIOGRAM REPORT: CPT | Mod: S$GLB,,, | Performed by: INTERNAL MEDICINE

## 2025-08-11 PROCEDURE — 84443 ASSAY THYROID STIM HORMONE: CPT

## 2025-08-11 PROCEDURE — 99214 OFFICE O/P EST MOD 30 MIN: CPT | Mod: S$GLB,,,

## 2025-08-11 PROCEDURE — 83036 HEMOGLOBIN GLYCOSYLATED A1C: CPT

## 2025-08-11 PROCEDURE — 85025 COMPLETE CBC W/AUTO DIFF WBC: CPT

## 2025-08-12 ENCOUNTER — TELEPHONE (OUTPATIENT)
Dept: ENDOCRINOLOGY | Facility: CLINIC | Age: 60
End: 2025-08-12

## 2025-08-12 ENCOUNTER — OFFICE VISIT (OUTPATIENT)
Dept: ENDOCRINOLOGY | Facility: CLINIC | Age: 60
End: 2025-08-12
Payer: COMMERCIAL

## 2025-08-12 ENCOUNTER — PATIENT MESSAGE (OUTPATIENT)
Dept: FAMILY MEDICINE | Facility: CLINIC | Age: 60
End: 2025-08-12
Payer: COMMERCIAL

## 2025-08-12 VITALS
HEIGHT: 66 IN | BODY MASS INDEX: 22.3 KG/M2 | WEIGHT: 138.75 LBS | SYSTOLIC BLOOD PRESSURE: 104 MMHG | DIASTOLIC BLOOD PRESSURE: 64 MMHG

## 2025-08-12 DIAGNOSIS — E07.9 THYROID EYE DISEASE: ICD-10-CM

## 2025-08-12 DIAGNOSIS — H57.89 THYROID EYE DISEASE: ICD-10-CM

## 2025-08-12 DIAGNOSIS — E89.0 POSTABLATIVE HYPOTHYROIDISM: Primary | ICD-10-CM

## 2025-08-12 DIAGNOSIS — G61.81 CIDP (CHRONIC INFLAMMATORY DEMYELINATING POLYNEUROPATHY): ICD-10-CM

## 2025-08-12 DIAGNOSIS — E55.9 VITAMIN D DEFICIENCY DISEASE: ICD-10-CM

## 2025-08-12 DIAGNOSIS — Z86.39 HX OF GRAVES' DISEASE: ICD-10-CM

## 2025-08-12 PROCEDURE — 1159F MED LIST DOCD IN RCRD: CPT | Mod: CPTII,S$GLB,, | Performed by: INTERNAL MEDICINE

## 2025-08-12 PROCEDURE — 99214 OFFICE O/P EST MOD 30 MIN: CPT | Mod: S$GLB,,, | Performed by: INTERNAL MEDICINE

## 2025-08-12 PROCEDURE — 3044F HG A1C LEVEL LT 7.0%: CPT | Mod: CPTII,S$GLB,, | Performed by: INTERNAL MEDICINE

## 2025-08-12 PROCEDURE — 3008F BODY MASS INDEX DOCD: CPT | Mod: CPTII,S$GLB,, | Performed by: INTERNAL MEDICINE

## 2025-08-12 PROCEDURE — 3074F SYST BP LT 130 MM HG: CPT | Mod: CPTII,S$GLB,, | Performed by: INTERNAL MEDICINE

## 2025-08-12 PROCEDURE — G2211 COMPLEX E/M VISIT ADD ON: HCPCS | Mod: S$GLB,,, | Performed by: INTERNAL MEDICINE

## 2025-08-12 PROCEDURE — 3078F DIAST BP <80 MM HG: CPT | Mod: CPTII,S$GLB,, | Performed by: INTERNAL MEDICINE

## 2025-08-12 PROCEDURE — 1160F RVW MEDS BY RX/DR IN RCRD: CPT | Mod: CPTII,S$GLB,, | Performed by: INTERNAL MEDICINE

## 2025-08-12 PROCEDURE — 99999 PR PBB SHADOW E&M-EST. PATIENT-LVL III: CPT | Mod: PBBFAC,,, | Performed by: INTERNAL MEDICINE

## 2025-08-12 RX ORDER — LIDOCAINE AND PRILOCAINE 25; 25 MG/G; MG/G
CREAM TOPICAL
COMMUNITY
Start: 2025-08-02

## 2025-08-12 RX ORDER — TRAMADOL HYDROCHLORIDE 50 MG/1
50 TABLET, FILM COATED ORAL EVERY 6 HOURS PRN
COMMUNITY

## 2025-08-12 RX ORDER — LEVOTHYROXINE SODIUM 100 UG/1
100 TABLET ORAL
Qty: 90 TABLET | Refills: 3 | Status: SHIPPED | OUTPATIENT
Start: 2025-08-12

## 2025-08-12 RX ORDER — LEVOTHYROXINE SODIUM 100 UG/1
100 TABLET ORAL
Qty: 90 TABLET | Refills: 3 | Status: SHIPPED | OUTPATIENT
Start: 2025-08-12 | End: 2025-08-12 | Stop reason: SDUPTHER

## 2025-08-12 RX ORDER — GABAPENTIN 900 MG/1
1 TABLET, FILM COATED ORAL DAILY
COMMUNITY

## 2025-08-19 ENCOUNTER — TELEPHONE (OUTPATIENT)
Dept: OPHTHALMOLOGY | Facility: CLINIC | Age: 60
End: 2025-08-19
Payer: COMMERCIAL

## 2025-08-19 ENCOUNTER — ANESTHESIA EVENT (OUTPATIENT)
Dept: SURGERY | Facility: HOSPITAL | Age: 60
End: 2025-08-19
Payer: COMMERCIAL

## 2025-08-20 ENCOUNTER — HOSPITAL ENCOUNTER (OUTPATIENT)
Facility: HOSPITAL | Age: 60
Discharge: HOME | End: 2025-08-20
Attending: STUDENT IN AN ORGANIZED HEALTH CARE EDUCATION/TRAINING PROGRAM | Admitting: STUDENT IN AN ORGANIZED HEALTH CARE EDUCATION/TRAINING PROGRAM
Payer: COMMERCIAL

## 2025-08-20 ENCOUNTER — ANESTHESIA (OUTPATIENT)
Dept: SURGERY | Facility: HOSPITAL | Age: 60
End: 2025-08-20
Payer: COMMERCIAL

## 2025-08-20 VITALS
OXYGEN SATURATION: 99 % | HEIGHT: 66 IN | HEART RATE: 75 BPM | TEMPERATURE: 98 F | BODY MASS INDEX: 22.29 KG/M2 | DIASTOLIC BLOOD PRESSURE: 59 MMHG | WEIGHT: 138.69 LBS | SYSTOLIC BLOOD PRESSURE: 111 MMHG | RESPIRATION RATE: 17 BRPM

## 2025-08-20 DIAGNOSIS — H50.22 HYPOTROPIA OF LEFT EYE: ICD-10-CM

## 2025-08-20 DIAGNOSIS — H57.89 THYROID EYE DISEASE: Primary | ICD-10-CM

## 2025-08-20 DIAGNOSIS — E07.9 THYROID EYE DISEASE: Primary | ICD-10-CM

## 2025-08-20 PROCEDURE — 37000008 HC ANESTHESIA 1ST 15 MINUTES: Performed by: STUDENT IN AN ORGANIZED HEALTH CARE EDUCATION/TRAINING PROGRAM

## 2025-08-20 PROCEDURE — 71000016 HC POSTOP RECOV ADDL HR: Performed by: STUDENT IN AN ORGANIZED HEALTH CARE EDUCATION/TRAINING PROGRAM

## 2025-08-20 PROCEDURE — 63600175 PHARM REV CODE 636 W HCPCS: Performed by: STUDENT IN AN ORGANIZED HEALTH CARE EDUCATION/TRAINING PROGRAM

## 2025-08-20 PROCEDURE — 37000009 HC ANESTHESIA EA ADD 15 MINS: Performed by: STUDENT IN AN ORGANIZED HEALTH CARE EDUCATION/TRAINING PROGRAM

## 2025-08-20 PROCEDURE — 25000003 PHARM REV CODE 250: Performed by: STUDENT IN AN ORGANIZED HEALTH CARE EDUCATION/TRAINING PROGRAM

## 2025-08-20 PROCEDURE — 36000707: Performed by: STUDENT IN AN ORGANIZED HEALTH CARE EDUCATION/TRAINING PROGRAM

## 2025-08-20 PROCEDURE — 71000015 HC POSTOP RECOV 1ST HR: Performed by: STUDENT IN AN ORGANIZED HEALTH CARE EDUCATION/TRAINING PROGRAM

## 2025-08-20 PROCEDURE — 67311 REVISE EYE MUSCLE: CPT | Mod: 51,RT,, | Performed by: STUDENT IN AN ORGANIZED HEALTH CARE EDUCATION/TRAINING PROGRAM

## 2025-08-20 PROCEDURE — 27200651 HC AIRWAY, LMA: Performed by: STUDENT IN AN ORGANIZED HEALTH CARE EDUCATION/TRAINING PROGRAM

## 2025-08-20 PROCEDURE — 67314 REVISE EYE MUSCLE: CPT | Mod: LT,,, | Performed by: STUDENT IN AN ORGANIZED HEALTH CARE EDUCATION/TRAINING PROGRAM

## 2025-08-20 PROCEDURE — 71000044 HC DOSC ROUTINE RECOVERY FIRST HOUR: Performed by: STUDENT IN AN ORGANIZED HEALTH CARE EDUCATION/TRAINING PROGRAM

## 2025-08-20 PROCEDURE — 36000706: Performed by: STUDENT IN AN ORGANIZED HEALTH CARE EDUCATION/TRAINING PROGRAM

## 2025-08-20 PROCEDURE — 25000003 PHARM REV CODE 250

## 2025-08-20 RX ORDER — IBUPROFEN 400 MG/1
800 TABLET, FILM COATED ORAL ONCE
Status: COMPLETED | OUTPATIENT
Start: 2025-08-20 | End: 2025-08-20

## 2025-08-20 RX ORDER — LIDOCAINE HYDROCHLORIDE 20 MG/ML
INJECTION INTRAVENOUS
Status: DISCONTINUED | OUTPATIENT
Start: 2025-08-20 | End: 2025-08-20

## 2025-08-20 RX ORDER — SODIUM CHLORIDE 0.9 % (FLUSH) 0.9 %
10 SYRINGE (ML) INJECTION
Status: DISCONTINUED | OUTPATIENT
Start: 2025-08-20 | End: 2025-08-20 | Stop reason: HOSPADM

## 2025-08-20 RX ORDER — PHENYLEPHRINE HYDROCHLORIDE 10 MG/ML
INJECTION INTRAVENOUS
Status: DISCONTINUED | OUTPATIENT
Start: 2025-08-20 | End: 2025-08-20

## 2025-08-20 RX ORDER — MIDAZOLAM HYDROCHLORIDE 1 MG/ML
INJECTION INTRAMUSCULAR; INTRAVENOUS
Status: DISCONTINUED | OUTPATIENT
Start: 2025-08-20 | End: 2025-08-20

## 2025-08-20 RX ORDER — DEXMEDETOMIDINE HYDROCHLORIDE 100 UG/ML
INJECTION, SOLUTION INTRAVENOUS
Status: DISCONTINUED | OUTPATIENT
Start: 2025-08-20 | End: 2025-08-20

## 2025-08-20 RX ORDER — SODIUM CHLORIDE 9 MG/ML
INJECTION, SOLUTION INTRAVENOUS CONTINUOUS PRN
Status: DISCONTINUED | OUTPATIENT
Start: 2025-08-20 | End: 2025-08-20

## 2025-08-20 RX ORDER — NEOMYCIN SULFATE, POLYMYXIN B SULFATE, AND DEXAMETHASONE 3.5; 10000; 1 MG/G; [USP'U]/G; MG/G
OINTMENT OPHTHALMIC
Status: DISCONTINUED | OUTPATIENT
Start: 2025-08-20 | End: 2025-08-20 | Stop reason: HOSPADM

## 2025-08-20 RX ORDER — DEXAMETHASONE SODIUM PHOSPHATE 4 MG/ML
INJECTION, SOLUTION INTRA-ARTICULAR; INTRALESIONAL; INTRAMUSCULAR; INTRAVENOUS; SOFT TISSUE
Status: DISCONTINUED | OUTPATIENT
Start: 2025-08-20 | End: 2025-08-20

## 2025-08-20 RX ORDER — GLUCAGON 1 MG
1 KIT INJECTION
Status: DISCONTINUED | OUTPATIENT
Start: 2025-08-20 | End: 2025-08-20 | Stop reason: HOSPADM

## 2025-08-20 RX ORDER — PROPOFOL 10 MG/ML
INJECTION, EMULSION INTRAVENOUS
Status: DISCONTINUED | OUTPATIENT
Start: 2025-08-20 | End: 2025-08-20

## 2025-08-20 RX ORDER — NEOMYCIN SULFATE, POLYMYXIN B SULFATE, AND DEXAMETHASONE 3.5; 10000; 1 MG/G; [USP'U]/G; MG/G
OINTMENT OPHTHALMIC
Status: DISCONTINUED
Start: 2025-08-20 | End: 2025-08-20 | Stop reason: HOSPADM

## 2025-08-20 RX ORDER — HYDROCODONE BITARTRATE AND ACETAMINOPHEN 5; 325 MG/1; MG/1
1 TABLET ORAL EVERY 6 HOURS PRN
Qty: 12 TABLET | Refills: 0 | Status: SHIPPED | OUTPATIENT
Start: 2025-08-20

## 2025-08-20 RX ORDER — ACETAMINOPHEN 10 MG/ML
INJECTION, SOLUTION INTRAVENOUS
Status: DISCONTINUED | OUTPATIENT
Start: 2025-08-20 | End: 2025-08-20

## 2025-08-20 RX ORDER — PHENYLEPHRINE HYDROCHLORIDE 25 MG/ML
SOLUTION/ DROPS OPHTHALMIC
Status: DISCONTINUED | OUTPATIENT
Start: 2025-08-20 | End: 2025-08-20 | Stop reason: HOSPADM

## 2025-08-20 RX ORDER — ONDANSETRON HYDROCHLORIDE 2 MG/ML
INJECTION, SOLUTION INTRAVENOUS
Status: DISCONTINUED | OUTPATIENT
Start: 2025-08-20 | End: 2025-08-20

## 2025-08-20 RX ORDER — PHENYLEPHRINE HYDROCHLORIDE 25 MG/ML
SOLUTION/ DROPS OPHTHALMIC
Status: DISCONTINUED
Start: 2025-08-20 | End: 2025-08-20 | Stop reason: HOSPADM

## 2025-08-20 RX ORDER — HYDROMORPHONE HYDROCHLORIDE 1 MG/ML
INJECTION, SOLUTION INTRAMUSCULAR; INTRAVENOUS; SUBCUTANEOUS
Status: COMPLETED
Start: 2025-08-20 | End: 2025-08-20

## 2025-08-20 RX ORDER — HYDROMORPHONE HYDROCHLORIDE 1 MG/ML
0.2 INJECTION, SOLUTION INTRAMUSCULAR; INTRAVENOUS; SUBCUTANEOUS EVERY 10 MIN PRN
Status: DISCONTINUED | OUTPATIENT
Start: 2025-08-20 | End: 2025-08-20 | Stop reason: HOSPADM

## 2025-08-20 RX ADMIN — HYDROMORPHONE HYDROCHLORIDE 0.2 MG: 1 INJECTION, SOLUTION INTRAMUSCULAR; INTRAVENOUS; SUBCUTANEOUS at 03:08

## 2025-08-20 RX ADMIN — PROPOFOL 150 MG: 10 INJECTION, EMULSION INTRAVENOUS at 12:08

## 2025-08-20 RX ADMIN — LIDOCAINE HYDROCHLORIDE 100 MG: 20 INJECTION INTRAVENOUS at 12:08

## 2025-08-20 RX ADMIN — PHENYLEPHRINE HYDROCHLORIDE 100 MCG: 10 INJECTION INTRAVENOUS at 01:08

## 2025-08-20 RX ADMIN — ONDANSETRON 4 MG: 2 INJECTION INTRAMUSCULAR; INTRAVENOUS at 01:08

## 2025-08-20 RX ADMIN — DEXAMETHASONE SODIUM PHOSPHATE 4 MG: 4 INJECTION, SOLUTION INTRAMUSCULAR; INTRAVENOUS at 01:08

## 2025-08-20 RX ADMIN — PHENYLEPHRINE HYDROCHLORIDE 50 MCG: 10 INJECTION INTRAVENOUS at 01:08

## 2025-08-20 RX ADMIN — DEXMEDETOMIDINE 8 MCG: 100 INJECTION, SOLUTION, CONCENTRATE INTRAVENOUS at 01:08

## 2025-08-20 RX ADMIN — SODIUM CHLORIDE: 0.9 INJECTION, SOLUTION INTRAVENOUS at 12:08

## 2025-08-20 RX ADMIN — IBUPROFEN 800 MG: 400 TABLET ORAL at 02:08

## 2025-08-20 RX ADMIN — MIDAZOLAM HYDROCHLORIDE 2 MG: 1 INJECTION, SOLUTION INTRAMUSCULAR; INTRAVENOUS at 12:08

## 2025-08-20 RX ADMIN — ACETAMINOPHEN 1000 MG: 10 INJECTION INTRAVENOUS at 01:08

## 2025-08-29 ENCOUNTER — OFFICE VISIT (OUTPATIENT)
Dept: OPHTHALMOLOGY | Facility: CLINIC | Age: 60
End: 2025-08-29
Payer: COMMERCIAL

## 2025-08-29 DIAGNOSIS — Z98.890 POST-OPERATIVE STATE: Primary | ICD-10-CM

## 2025-08-29 DIAGNOSIS — E07.9 THYROID EYE DISEASE: ICD-10-CM

## 2025-08-29 DIAGNOSIS — H57.89 THYROID EYE DISEASE: ICD-10-CM

## 2025-08-29 DIAGNOSIS — H50.9 STRABISMUS: ICD-10-CM

## 2025-08-29 PROCEDURE — 99999 PR PBB SHADOW E&M-EST. PATIENT-LVL III: CPT | Mod: PBBFAC,,, | Performed by: STUDENT IN AN ORGANIZED HEALTH CARE EDUCATION/TRAINING PROGRAM

## 2025-08-29 RX ORDER — NEOMYCIN SULFATE, POLYMYXIN B SULFATE, AND DEXAMETHASONE 3.5; 10000; 1 MG/G; [USP'U]/G; MG/G
OINTMENT OPHTHALMIC 3 TIMES DAILY
Qty: 3.5 G | Refills: 1 | Status: SHIPPED | OUTPATIENT
Start: 2025-08-29

## (undated) DEVICE — SEE L#120831

## (undated) DEVICE — SYS LABLNG CORECT MED 4 FLG

## (undated) DEVICE — SHEATH LENS CLN 4MM 0D A70940A

## (undated) DEVICE — SPLINT INTRANASAL POSISEP .6X2

## (undated) DEVICE — DRAPE THREE-QUARTER 53X77IN

## (undated) DEVICE — NASAL AIRWAY SPLINT

## (undated) DEVICE — GLOVE SURG BIOGEL LATEX SZ 7.5

## (undated) DEVICE — SYR 12CC CNTRL L-L NO NDL

## (undated) DEVICE — SUPPORT ULNA NERVE PROTECTOR

## (undated) DEVICE — SEE MEDLINE ITEM 156946

## (undated) DEVICE — SUT 4-0 CHROMIC GUT / P-3

## (undated) DEVICE — SOL 9P NACL IRR PIC IL

## (undated) DEVICE — DRAPE SLUSH WARMER WITH DISC

## (undated) DEVICE — FORCEP CURVED DISP

## (undated) DEVICE — PADS ADHESIVE

## (undated) DEVICE — TRACKER ENT INSTRUMENT

## (undated) DEVICE — PATIENT TRACKER ENT

## (undated) DEVICE — POSITIONER HEAD DONUT 9IN FOAM

## (undated) DEVICE — CORD BIPOLAR 12 FOOT

## (undated) DEVICE — SEAL SCOPE WARMER 20/BX

## (undated) DEVICE — SUT VICRYL CTD 6-0 S-29 12

## (undated) DEVICE — NDL SPINAL SPINOCAN 22GX3.5

## (undated) DEVICE — HEMOSTAT SURGICEL 4X8IN

## (undated) DEVICE — DRESSING TRANS 2X2 TEGADERM

## (undated) DEVICE — MARKER SKIN FINE TIP

## (undated) DEVICE — SUT 6/0 18IN PLAIN GUT G-1

## (undated) DEVICE — SUT 6/0 18IN PLAIN GUT D/A

## (undated) DEVICE — SUT PLN GUT 4-0 SC-1SC-1 1

## (undated) DEVICE — BLADE QUADCUT STRAIGHT 4.3MM

## (undated) DEVICE — SEE MEDLINE ITEM 157117

## (undated) DEVICE — NDL 27G X 1 1/4

## (undated) DEVICE — CONTAINER SPECIMEN STRL 4OZ

## (undated) DEVICE — SOL BETADINE 5%

## (undated) DEVICE — DRAPE STERI INSTRUMENT 1018

## (undated) DEVICE — SEE MEDLINE ITEM 146313

## (undated) DEVICE — SET EXT W/2 VLVE PORTS 40

## (undated) DEVICE — TUBING XPS IRRIG TO STRAIGHTSH

## (undated) DEVICE — SUT SILK 6-0 TG140-8 18IN

## (undated) DEVICE — TRAY MUSCLE LID EYE

## (undated) DEVICE — MATRIX HEMSTAT FLOSEAL 5ML

## (undated) DEVICE — Device

## (undated) DEVICE — PACK HEAD & NECK

## (undated) DEVICE — DRAPE STRABISMUS STRL 40X48IN

## (undated) DEVICE — BLADE INFERIOR TURBINATE 2MM

## (undated) DEVICE — BLANKET LOWER BODY 55.9X40.2IN

## (undated) DEVICE — SEE MEDLINE ITEM 152622

## (undated) DEVICE — SEE MEDLINE ITEM 146347

## (undated) DEVICE — ELECTRODE REM PLYHSV RETURN 9

## (undated) DEVICE — BLADE SCALP OPHTL RND TIP

## (undated) DEVICE — SPONGE PATTY SURGICAL .5X3IN

## (undated) DEVICE — SOL NS 1000CC

## (undated) DEVICE — HEMOSTAT SURGICEL FIBRLR 1X2IN

## (undated) DEVICE — SUT PROLENE 3-0 36 V-7V-7